# Patient Record
Sex: FEMALE | Race: WHITE | Employment: OTHER | URBAN - METROPOLITAN AREA
[De-identification: names, ages, dates, MRNs, and addresses within clinical notes are randomized per-mention and may not be internally consistent; named-entity substitution may affect disease eponyms.]

---

## 2017-03-02 RX ORDER — MELOXICAM 7.5 MG
TABLET ORAL
Qty: 90 TABLET | Refills: 0 | OUTPATIENT
Start: 2017-03-02

## 2017-04-25 ENCOUNTER — TELEPHONE (OUTPATIENT)
Dept: FAMILY MEDICINE CLINIC | Age: 77
End: 2017-04-25

## 2017-04-25 ENCOUNTER — OFFICE VISIT (OUTPATIENT)
Dept: FAMILY MEDICINE CLINIC | Age: 77
End: 2017-04-25

## 2017-04-25 VITALS
DIASTOLIC BLOOD PRESSURE: 64 MMHG | RESPIRATION RATE: 16 BRPM | OXYGEN SATURATION: 98 % | BODY MASS INDEX: 30.19 KG/M2 | HEART RATE: 70 BPM | HEIGHT: 60 IN | WEIGHT: 153.8 LBS | SYSTOLIC BLOOD PRESSURE: 134 MMHG

## 2017-04-25 DIAGNOSIS — E78.5 HYPERLIPIDEMIA, UNSPECIFIED HYPERLIPIDEMIA TYPE: ICD-10-CM

## 2017-04-25 DIAGNOSIS — I10 ESSENTIAL HYPERTENSION, BENIGN: ICD-10-CM

## 2017-04-25 DIAGNOSIS — Z82.49 FH: HEART DISEASE: ICD-10-CM

## 2017-04-25 DIAGNOSIS — E78.5 HYPERLIPIDEMIA, UNSPECIFIED HYPERLIPIDEMIA TYPE: Primary | ICD-10-CM

## 2017-04-25 DIAGNOSIS — R07.81 RIB PAIN ON LEFT SIDE: Primary | ICD-10-CM

## 2017-04-25 DIAGNOSIS — R42 FEELING FAINT: ICD-10-CM

## 2017-04-25 DIAGNOSIS — R21 RASH: ICD-10-CM

## 2017-04-25 DIAGNOSIS — B35.1 ONYCHOMYCOSIS: ICD-10-CM
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
## 2017-04-25 PROCEDURE — 99214 OFFICE O/P EST MOD 30 MIN: CPT | Performed by: FAMILY MEDICINE

## 2017-04-25 PROCEDURE — 93000 ELECTROCARDIOGRAM COMPLETE: CPT | Performed by: FAMILY MEDICINE

## 2017-04-25 RX ORDER — FLUVASTATIN SODIUM 80 MG/1
TABLET, FILM COATED, EXTENDED RELEASE ORAL
Qty: 30 TABLET | Refills: 11 | Status: SHIPPED | OUTPATIENT
Start: 2017-04-25 | End: 2018-04-30 | Stop reason: SDUPTHER

## 2017-04-25 ASSESSMENT — PATIENT HEALTH QUESTIONNAIRE - PHQ9
1. LITTLE INTEREST OR PLEASURE IN DOING THINGS: 0
SUM OF ALL RESPONSES TO PHQ9 QUESTIONS 1 & 2: 0
SUM OF ALL RESPONSES TO PHQ QUESTIONS 1-9: 0
2. FEELING DOWN, DEPRESSED OR HOPELESS: 0

## 2017-06-05 RX ORDER — MELOXICAM 7.5 MG
TABLET ORAL
Qty: 90 TABLET | Refills: 2 | Status: SHIPPED | OUTPATIENT
Start: 2017-06-05 | End: 2017-09-11 | Stop reason: SDUPTHER

## 2017-06-19 DIAGNOSIS — I10 ESSENTIAL HYPERTENSION, BENIGN: ICD-10-CM

## 2017-06-19 DIAGNOSIS — E78.5 HYPERLIPIDEMIA, UNSPECIFIED HYPERLIPIDEMIA TYPE: ICD-10-CM

## 2017-06-19 LAB
A/G RATIO: 1.5 (ref 1.1–2.2)
ALBUMIN SERPL-MCNC: 4 G/DL (ref 3.4–5)
ALP BLD-CCNC: 78 U/L (ref 40–129)
ALT SERPL-CCNC: 17 U/L (ref 10–40)
ANION GAP SERPL CALCULATED.3IONS-SCNC: 12 MMOL/L (ref 3–16)
AST SERPL-CCNC: 19 U/L (ref 15–37)
BASOPHILS ABSOLUTE: 0 K/UL (ref 0–0.2)
BASOPHILS RELATIVE PERCENT: 0.7 %
BILIRUB SERPL-MCNC: 0.4 MG/DL (ref 0–1)
BUN BLDV-MCNC: 20 MG/DL (ref 7–20)
CALCIUM SERPL-MCNC: 9.1 MG/DL (ref 8.3–10.6)
CHLORIDE BLD-SCNC: 105 MMOL/L (ref 99–110)
CHOLESTEROL, TOTAL: 148 MG/DL (ref 0–199)
CO2: 26 MMOL/L (ref 21–32)
CREAT SERPL-MCNC: 0.8 MG/DL (ref 0.6–1.2)
EOSINOPHILS ABSOLUTE: 0.1 K/UL (ref 0–0.6)
EOSINOPHILS RELATIVE PERCENT: 2.4 %
GFR AFRICAN AMERICAN: >60
GFR NON-AFRICAN AMERICAN: >60
GLOBULIN: 2.7 G/DL
GLUCOSE BLD-MCNC: 94 MG/DL (ref 70–99)
HCT VFR BLD CALC: 36 % (ref 36–48)
HDLC SERPL-MCNC: 65 MG/DL (ref 40–60)
HEMOGLOBIN: 11.8 G/DL (ref 12–16)
LDL CHOLESTEROL CALCULATED: 69 MG/DL
LYMPHOCYTES ABSOLUTE: 1.5 K/UL (ref 1–5.1)
LYMPHOCYTES RELATIVE PERCENT: 25.2 %
MCH RBC QN AUTO: 29.3 PG (ref 26–34)
MCHC RBC AUTO-ENTMCNC: 32.7 G/DL (ref 31–36)
MCV RBC AUTO: 89.4 FL (ref 80–100)
MONOCYTES ABSOLUTE: 0.4 K/UL (ref 0–1.3)
MONOCYTES RELATIVE PERCENT: 6.7 %
NEUTROPHILS ABSOLUTE: 4 K/UL (ref 1.7–7.7)
NEUTROPHILS RELATIVE PERCENT: 65 %
PDW BLD-RTO: 14 % (ref 12.4–15.4)
PLATELET # BLD: 203 K/UL (ref 135–450)
PMV BLD AUTO: 8.9 FL (ref 5–10.5)
POTASSIUM SERPL-SCNC: 4.1 MMOL/L (ref 3.5–5.1)
RBC # BLD: 4.03 M/UL (ref 4–5.2)
SODIUM BLD-SCNC: 143 MMOL/L (ref 136–145)
TOTAL PROTEIN: 6.7 G/DL (ref 6.4–8.2)
TRIGL SERPL-MCNC: 72 MG/DL (ref 0–150)
TSH REFLEX: 3.67 UIU/ML (ref 0.27–4.2)
VLDLC SERPL CALC-MCNC: 14 MG/DL
WBC # BLD: 6.1 K/UL (ref 4–11)

## 2017-06-27 DIAGNOSIS — M79.89 LEG SWELLING: ICD-10-CM

## 2017-06-27 RX ORDER — FUROSEMIDE 20 MG/1
20 TABLET ORAL DAILY PRN
Qty: 30 TABLET | Refills: 3 | Status: SHIPPED | OUTPATIENT
Start: 2017-06-27 | End: 2018-10-06 | Stop reason: SDUPTHER

## 2017-06-29 ENCOUNTER — OFFICE VISIT (OUTPATIENT)
Dept: FAMILY MEDICINE CLINIC | Age: 77
End: 2017-06-29

## 2017-06-29 VITALS
HEART RATE: 68 BPM | HEIGHT: 59 IN | WEIGHT: 162 LBS | OXYGEN SATURATION: 97 % | BODY MASS INDEX: 32.66 KG/M2 | DIASTOLIC BLOOD PRESSURE: 64 MMHG | RESPIRATION RATE: 14 BRPM | SYSTOLIC BLOOD PRESSURE: 134 MMHG

## 2017-06-29 DIAGNOSIS — Z00.00 MEDICARE ANNUAL WELLNESS VISIT, SUBSEQUENT: Primary | ICD-10-CM

## 2017-06-29 DIAGNOSIS — Z00.00 ROUTINE GENERAL MEDICAL EXAMINATION AT A HEALTH CARE FACILITY: ICD-10-CM

## 2017-06-29 PROCEDURE — G0439 PPPS, SUBSEQ VISIT: HCPCS | Performed by: FAMILY MEDICINE

## 2017-06-29 ASSESSMENT — PATIENT HEALTH QUESTIONNAIRE - PHQ9: SUM OF ALL RESPONSES TO PHQ QUESTIONS 1-9: 0

## 2017-06-29 ASSESSMENT — ANXIETY QUESTIONNAIRES: GAD7 TOTAL SCORE: 0

## 2017-06-29 ASSESSMENT — LIFESTYLE VARIABLES: HOW OFTEN DO YOU HAVE A DRINK CONTAINING ALCOHOL: 0

## 2017-07-13 ENCOUNTER — OFFICE VISIT (OUTPATIENT)
Dept: FAMILY MEDICINE CLINIC | Age: 77
End: 2017-07-13

## 2017-07-13 VITALS
BODY MASS INDEX: 29.81 KG/M2 | WEIGHT: 162 LBS | OXYGEN SATURATION: 98 % | RESPIRATION RATE: 14 BRPM | SYSTOLIC BLOOD PRESSURE: 125 MMHG | HEART RATE: 70 BPM | HEIGHT: 62 IN | DIASTOLIC BLOOD PRESSURE: 60 MMHG
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
## 2017-07-13 PROCEDURE — 99212 OFFICE O/P EST SF 10 MIN: CPT | Performed by: FAMILY MEDICINE

## 2017-07-13 RX ORDER — TRIAMCINOLONE ACETONIDE 1 MG/G
CREAM TOPICAL 2 TIMES DAILY
Qty: 30 G | Refills: 1 | Status: SHIPPED | OUTPATIENT
Start: 2017-07-13 | End: 2017-12-18 | Stop reason: ALTCHOICE

## 2017-07-24 ENCOUNTER — TELEPHONE (OUTPATIENT)
Dept: FAMILY MEDICINE CLINIC | Age: 77
End: 2017-07-24

## 2017-07-25 ENCOUNTER — TELEPHONE (OUTPATIENT)
Dept: FAMILY MEDICINE CLINIC | Age: 77
End: 2017-07-25

## 2017-07-25 RX ORDER — DIAZEPAM 5 MG/1
TABLET ORAL
Qty: 5 TABLET | Refills: 0 | Status: SHIPPED | OUTPATIENT
Start: 2017-07-25 | End: 2021-04-15 | Stop reason: SDUPTHER

## 2017-08-07 RX ORDER — MELOXICAM 7.5 MG
TABLET ORAL
Qty: 90 TABLET | Refills: 2 | OUTPATIENT
Start: 2017-08-07

## 2017-08-08 ENCOUNTER — TELEPHONE (OUTPATIENT)
Dept: FAMILY MEDICINE CLINIC | Age: 77
End: 2017-08-08

## 2017-09-01 ENCOUNTER — TELEPHONE (OUTPATIENT)
Dept: FAMILY MEDICINE CLINIC | Age: 77
End: 2017-09-01

## 2017-09-11 RX ORDER — MELOXICAM 7.5 MG
TABLET ORAL
Qty: 90 TABLET | Refills: 2 | Status: SHIPPED | OUTPATIENT
Start: 2017-09-11 | End: 2017-10-10 | Stop reason: SDUPTHER

## 2017-09-19 RX ORDER — OLMESARTAN MEDOXOMIL 20 MG/1
20 TABLET ORAL DAILY
Qty: 90 TABLET | Refills: 1 | Status: SHIPPED | OUTPATIENT
Start: 2017-09-19 | End: 2017-11-13

## 2017-10-04 NOTE — TELEPHONE ENCOUNTER
Last ov 07/03/17   Last refill 09/11/17 #90 [facility] this [facility] came from [facility]

## 2017-10-05 RX ORDER — MELOXICAM 7.5 MG
TABLET ORAL
Qty: 30 TABLET | OUTPATIENT
Start: 2017-10-05

## 2017-10-05 NOTE — TELEPHONE ENCOUNTER
I doubt she needs this and she should've received 90 from her mail order in mid-September but check with her

## 2017-10-06 RX ORDER — OLMESARTAN MEDOXOMIL 20 MG/1
TABLET, FILM COATED ORAL
Qty: 30 TABLET | OUTPATIENT
Start: 2017-10-06

## 2017-10-10 RX ORDER — MELOXICAM 7.5 MG
TABLET ORAL
Qty: 90 TABLET | Refills: 0 | Status: SHIPPED | OUTPATIENT
Start: 2017-10-10 | End: 2018-01-05 | Stop reason: SDUPTHER

## 2017-10-10 NOTE — TELEPHONE ENCOUNTER
Patient returned our call stating that she does not use a mail order pharmacy, she uses [facility] pharmacy. She stated that she has never received the medicine.

## 2017-11-13 RX ORDER — OLMESARTAN MEDOXOMIL 20 MG/1
TABLET, FILM COATED ORAL
Qty: 30 TABLET | Refills: 5 | Status: SHIPPED | OUTPATIENT
Start: 2017-11-13 | End: 2017-11-20

## 2017-11-20 RX ORDER — OLMESARTAN MEDOXOMIL 20 MG/1
20 TABLET ORAL DAILY
Qty: 90 TABLET | Refills: 3 | Status: SHIPPED | OUTPATIENT
Start: 2017-11-20 | End: 2018-06-05

## 2017-11-20 NOTE — TELEPHONE ENCOUNTER
Received pharmacy request to change BENICAR 20 MG tablet to Olmesartan Medoxomil 20MG Tabs to save pt money

## 2017-11-20 NOTE — TELEPHONE ENCOUNTER
If okay, rx pending.     Last ov: 07/13/17 with Dr. Johnathan Negrete for insect bites, 06/29/17 with Dr. Laina Bess for Medicare Wellness  Last refill: 09/19/17, #90, 1 refill But was switched back to name brand    Lab Results   Component Value Date     06/19/2017    K 4.1 06/19/2017     06/19/2017    CO2 26 06/19/2017    BUN 20 06/19/2017    CREATININE 0.8 06/19/2017    GLUCOSE 94 06/19/2017    CALCIUM 9.1 06/19/2017    PROT 6.7 06/19/2017    LABALBU 4.0 06/19/2017    BILITOT 0.4 06/19/2017    ALKPHOS 78 06/19/2017    AST 19 06/19/2017    ALT 17 06/19/2017    LABGLOM >60 06/19/2017    GFRAA >60 06/19/2017    AGRATIO 1.5 06/19/2017    GLOB 2.7 06/19/2017

## 2017-12-18 ENCOUNTER — OFFICE VISIT (OUTPATIENT)
Dept: FAMILY MEDICINE CLINIC | Age: 77
End: 2017-12-18

## 2017-12-18 VITALS
HEART RATE: 70 BPM | DIASTOLIC BLOOD PRESSURE: 60 MMHG | RESPIRATION RATE: 16 BRPM | SYSTOLIC BLOOD PRESSURE: 140 MMHG | WEIGHT: 164.8 LBS | OXYGEN SATURATION: 97 % | BODY MASS INDEX: 30.14 KG/M2

## 2017-12-18 DIAGNOSIS — I10 ESSENTIAL HYPERTENSION, BENIGN: Primary | ICD-10-CM

## 2017-12-18 PROCEDURE — 99213 OFFICE O/P EST LOW 20 MIN: CPT | Performed by: FAMILY MEDICINE

## 2017-12-22 ENCOUNTER — OFFICE VISIT (OUTPATIENT)
Dept: FAMILY MEDICINE CLINIC | Age: 77
End: 2017-12-22

## 2017-12-22 VITALS
HEIGHT: 62 IN | SYSTOLIC BLOOD PRESSURE: 160 MMHG | BODY MASS INDEX: 30.18 KG/M2 | HEART RATE: 68 BPM | WEIGHT: 164 LBS | DIASTOLIC BLOOD PRESSURE: 70 MMHG | OXYGEN SATURATION: 98 % | RESPIRATION RATE: 14 BRPM

## 2017-12-22 DIAGNOSIS — R60.0 PEDAL EDEMA: Primary | ICD-10-CM

## 2017-12-22 DIAGNOSIS — I10 ESSENTIAL HYPERTENSION, BENIGN: ICD-10-CM

## 2017-12-22 PROCEDURE — 99213 OFFICE O/P EST LOW 20 MIN: CPT | Performed by: FAMILY MEDICINE

## 2017-12-22 RX ORDER — HYDROCHLOROTHIAZIDE 12.5 MG/1
12.5 TABLET ORAL DAILY
Qty: 30 TABLET | Refills: 3 | Status: SHIPPED | OUTPATIENT
Start: 2017-12-22 | End: 2018-12-18

## 2017-12-22 ASSESSMENT — ENCOUNTER SYMPTOMS
WHEEZING: 0
GASTROINTESTINAL NEGATIVE: 1
SHORTNESS OF BREATH: 0
RESPIRATORY NEGATIVE: 1

## 2017-12-22 NOTE — PATIENT INSTRUCTIONS
have signs of a blood clot, such as:  ¨ Pain in your calf, back of the knee, thigh, or groin. ¨ Redness and swelling in your leg or groin. ? · You have symptoms of infection, such as:  ¨ Increased pain, swelling, warmth, or redness. ¨ Red streaks or pus. ¨ A fever. ? Watch closely for changes in your health, and be sure to contact your doctor if:  ? · Your swelling is getting worse. ? · You have new or worsening pain in your legs. ? · You do not get better as expected. Where can you learn more? Go to [email or web address] and sign in to your [employer] account. Enter V191 in the [employer] box to learn more about \"Leg and Ankle Edema: Care Instructions. \"     If you do not have an account, please click on the \"Sign Up Now\" link. Current as of: March 20, 2017  Content Version: 11.4  © 0419-9574 Healthwise, EverConnect. Care instructions adapted under license by [facility] [place] ([facility]). If you have questions about a medical condition or this instruction, always ask your healthcare professional. [place] [address] any warranty or liability for your use of this information.

## 2018-01-15 ENCOUNTER — TELEPHONE (OUTPATIENT)
Dept: FAMILY MEDICINE CLINIC | Age: 78
End: 2018-01-15

## 2018-01-15 NOTE — TELEPHONE ENCOUNTER
If she only wants trade name Mobic or has not tried anything else: probably not if they offer generic Meloxicam or she has to try 1 or 2 other medications

## 2018-01-15 NOTE — TELEPHONE ENCOUNTER
Patient called back in stated that she spoke with [employer] told her that a prior authorizations needs to be done on the prescription.  [facility] number [phone]

## 2018-02-06 DIAGNOSIS — K21.9 GASTROESOPHAGEAL REFLUX DISEASE, ESOPHAGITIS PRESENCE NOT SPECIFIED: ICD-10-CM

## 2018-02-06 RX ORDER — OMEPRAZOLE 20 MG/1
CAPSULE, DELAYED RELEASE ORAL
Qty: 30 CAPSULE | Refills: 10 | Status: SHIPPED | OUTPATIENT
Start: 2018-02-06 | End: 2019-01-23 | Stop reason: SDUPTHER

## 2018-02-13 ENCOUNTER — TELEPHONE (OUTPATIENT)
Dept: FAMILY MEDICINE CLINIC | Age: 78
End: 2018-02-13

## 2018-02-13 NOTE — TELEPHONE ENCOUNTER
Patient called in stating that she was prescribed Hydrochlorothiazide and is still having swelling in her left foot. She stated that she wants to know if there is something else that can be prescribed to her because she has read the side effects and it stated that this could cause eye issues. She stated that her father was blind and she does not want to take anything that can possibly damage her eyes.     Please Advise

## 2018-02-14 ENCOUNTER — TELEPHONE (OUTPATIENT)
Dept: ORTHOPEDIC SURGERY | Age: 78
End: 2018-02-14

## 2018-02-14 NOTE — TELEPHONE ENCOUNTER
Completed a PA request on [facility] for Mobic and the medication was     APPROVED    Exp: 1/15/18-2/14/19    Please advise patient

## 2018-02-19 ENCOUNTER — TELEPHONE (OUTPATIENT)
Dept: FAMILY MEDICINE CLINIC | Age: 78
End: 2018-02-19

## 2018-02-19 DIAGNOSIS — I10 ESSENTIAL HYPERTENSION, BENIGN: Primary | ICD-10-CM

## 2018-02-19 DIAGNOSIS — Z00.00 MEDICARE ANNUAL WELLNESS VISIT, INITIAL: ICD-10-CM

## 2018-02-19 DIAGNOSIS — E78.5 HYPERLIPIDEMIA, UNSPECIFIED HYPERLIPIDEMIA TYPE: ICD-10-CM

## 2018-03-01 DIAGNOSIS — E78.5 HYPERLIPIDEMIA, UNSPECIFIED HYPERLIPIDEMIA TYPE: ICD-10-CM

## 2018-03-01 DIAGNOSIS — R73.03 PREDIABETES: ICD-10-CM

## 2018-03-01 DIAGNOSIS — I10 ESSENTIAL HYPERTENSION, BENIGN: ICD-10-CM

## 2018-03-01 DIAGNOSIS — Z13.1 SCREENING FOR DIABETES MELLITUS (DM): ICD-10-CM

## 2018-03-01 DIAGNOSIS — I10 ESSENTIAL HYPERTENSION, BENIGN: Primary | ICD-10-CM

## 2018-03-01 LAB
A/G RATIO: 1.9 (ref 1.1–2.2)
ALBUMIN SERPL-MCNC: 4.2 G/DL (ref 3.4–5)
ALP BLD-CCNC: 79 U/L (ref 40–129)
ALT SERPL-CCNC: 12 U/L (ref 10–40)
ANION GAP SERPL CALCULATED.3IONS-SCNC: 13 MMOL/L (ref 3–16)
AST SERPL-CCNC: 18 U/L (ref 15–37)
BASOPHILS ABSOLUTE: 0 K/UL (ref 0–0.2)
BASOPHILS RELATIVE PERCENT: 0.5 %
BILIRUB SERPL-MCNC: 0.7 MG/DL (ref 0–1)
BUN BLDV-MCNC: 21 MG/DL (ref 7–20)
CALCIUM SERPL-MCNC: 8.6 MG/DL (ref 8.3–10.6)
CHLORIDE BLD-SCNC: 105 MMOL/L (ref 99–110)
CHOLESTEROL, TOTAL: 154 MG/DL (ref 0–199)
CO2: 26 MMOL/L (ref 21–32)
CREAT SERPL-MCNC: 0.9 MG/DL (ref 0.6–1.2)
EOSINOPHILS ABSOLUTE: 0.1 K/UL (ref 0–0.6)
EOSINOPHILS RELATIVE PERCENT: 2.2 %
GFR AFRICAN AMERICAN: >60
GFR NON-AFRICAN AMERICAN: >60
GLOBULIN: 2.2 G/DL
GLUCOSE BLD-MCNC: 102 MG/DL (ref 70–99)
HCT VFR BLD CALC: 36.2 % (ref 36–48)
HDLC SERPL-MCNC: 58 MG/DL (ref 40–60)
HEMOGLOBIN: 12.2 G/DL (ref 12–16)
LDL CHOLESTEROL CALCULATED: 64 MG/DL
LYMPHOCYTES ABSOLUTE: 1.4 K/UL (ref 1–5.1)
LYMPHOCYTES RELATIVE PERCENT: 28.5 %
MCH RBC QN AUTO: 29.8 PG (ref 26–34)
MCHC RBC AUTO-ENTMCNC: 33.6 G/DL (ref 31–36)
MCV RBC AUTO: 88.8 FL (ref 80–100)
MONOCYTES ABSOLUTE: 0.4 K/UL (ref 0–1.3)
MONOCYTES RELATIVE PERCENT: 7.8 %
NEUTROPHILS ABSOLUTE: 3.1 K/UL (ref 1.7–7.7)
NEUTROPHILS RELATIVE PERCENT: 61 %
PDW BLD-RTO: 14.3 % (ref 12.4–15.4)
PLATELET # BLD: 237 K/UL (ref 135–450)
PMV BLD AUTO: 8.5 FL (ref 5–10.5)
POTASSIUM SERPL-SCNC: 4.1 MMOL/L (ref 3.5–5.1)
RBC # BLD: 4.08 M/UL (ref 4–5.2)
SODIUM BLD-SCNC: 144 MMOL/L (ref 136–145)
TOTAL PROTEIN: 6.4 G/DL (ref 6.4–8.2)
TRIGL SERPL-MCNC: 158 MG/DL (ref 0–150)
TSH REFLEX: 2.77 UIU/ML (ref 0.27–4.2)
VLDLC SERPL CALC-MCNC: 32 MG/DL
WBC # BLD: 5 K/UL (ref 4–11)

## 2018-03-02 LAB
ESTIMATED AVERAGE GLUCOSE: 111.2 MG/DL
HBA1C MFR BLD: 5.5 %

## 2018-03-08 ENCOUNTER — OFFICE VISIT (OUTPATIENT)
Dept: FAMILY MEDICINE CLINIC | Age: 78
End: 2018-03-08

## 2018-03-08 VITALS
SYSTOLIC BLOOD PRESSURE: 134 MMHG | OXYGEN SATURATION: 94 % | WEIGHT: 165 LBS | BODY MASS INDEX: 30.18 KG/M2 | DIASTOLIC BLOOD PRESSURE: 70 MMHG | HEART RATE: 65 BPM

## 2018-03-08 DIAGNOSIS — E78.2 MIXED HYPERLIPIDEMIA: ICD-10-CM

## 2018-03-08 DIAGNOSIS — R60.0 PEDAL EDEMA: ICD-10-CM

## 2018-03-08 DIAGNOSIS — Z00.00 MEDICARE ANNUAL WELLNESS VISIT, SUBSEQUENT: Primary | ICD-10-CM

## 2018-03-08 DIAGNOSIS — Z00.00 ROUTINE GENERAL MEDICAL EXAMINATION AT A HEALTH CARE FACILITY: ICD-10-CM

## 2018-03-08 DIAGNOSIS — H92.02 LEFT EAR PAIN: ICD-10-CM

## 2018-03-08 DIAGNOSIS — I10 ESSENTIAL HYPERTENSION, BENIGN: ICD-10-CM

## 2018-03-08 PROCEDURE — G0439 PPPS, SUBSEQ VISIT: HCPCS | Performed by: FAMILY MEDICINE

## 2018-03-08 ASSESSMENT — ANXIETY QUESTIONNAIRES: GAD7 TOTAL SCORE: 0

## 2018-03-08 ASSESSMENT — LIFESTYLE VARIABLES: HOW OFTEN DO YOU HAVE A DRINK CONTAINING ALCOHOL: 0

## 2018-03-08 ASSESSMENT — PATIENT HEALTH QUESTIONNAIRE - PHQ9: SUM OF ALL RESPONSES TO PHQ QUESTIONS 1-9: 0

## 2018-04-30 DIAGNOSIS — E78.5 HYPERLIPIDEMIA, UNSPECIFIED HYPERLIPIDEMIA TYPE: ICD-10-CM

## 2018-04-30 RX ORDER — FLUVASTATIN SODIUM 80 MG/1
TABLET, EXTENDED RELEASE ORAL
Qty: 30 TABLET | Refills: 11 | Status: SHIPPED | OUTPATIENT
Start: 2018-04-30 | End: 2019-05-02 | Stop reason: SDUPTHER

## 2018-07-20 ENCOUNTER — OFFICE VISIT (OUTPATIENT)
Dept: FAMILY MEDICINE CLINIC | Age: 78
End: 2018-07-20

## 2018-07-20 VITALS
WEIGHT: 164.8 LBS | DIASTOLIC BLOOD PRESSURE: 76 MMHG | OXYGEN SATURATION: 99 % | HEART RATE: 66 BPM | SYSTOLIC BLOOD PRESSURE: 124 MMHG | BODY MASS INDEX: 30.14 KG/M2

## 2018-07-20 DIAGNOSIS — R59.1 LYMPHADENOPATHY: Primary | ICD-10-CM

## 2018-07-20 PROCEDURE — 99213 OFFICE O/P EST LOW 20 MIN: CPT | Performed by: FAMILY MEDICINE

## 2018-07-20 ASSESSMENT — PATIENT HEALTH QUESTIONNAIRE - PHQ9
1. LITTLE INTEREST OR PLEASURE IN DOING THINGS: 0
SUM OF ALL RESPONSES TO PHQ QUESTIONS 1-9: 0
2. FEELING DOWN, DEPRESSED OR HOPELESS: 0
SUM OF ALL RESPONSES TO PHQ9 QUESTIONS 1 & 2: 0

## 2018-07-20 ASSESSMENT — ENCOUNTER SYMPTOMS
SORE THROAT: 0
SHORTNESS OF BREATH: 0
GASTROINTESTINAL NEGATIVE: 1
RHINORRHEA: 0
COUGH: 0

## 2018-07-20 NOTE — PROGRESS NOTES
Subjective:      Patient ID: Darryl Sanchez is a 70 y.o. female. HPI  Felt a knot on both sides of her neck last week. No pain or other symptoms. She denies any sore throat, earache, fever, chills, diaphoresis, appetite or weight change. See review of systems  Review of Systems   Constitutional: Negative for chills, diaphoresis and fever. HENT: Negative for congestion, ear pain, postnasal drip, rhinorrhea and sore throat. Respiratory: Negative for cough and shortness of breath. Cardiovascular: Negative. Gastrointestinal: Negative. Genitourinary: Negative. Musculoskeletal: Negative. Neurological: Negative. Objective:   Physical Exam   Constitutional: She is oriented to person, place, and time. She appears well-developed. No distress. HENT:   Right Ear: Tympanic membrane, external ear and ear canal normal.   Left Ear: Tympanic membrane, external ear and ear canal normal.   Mouth/Throat: Oropharynx is clear and moist. No oropharyngeal exudate. Neck: Neck supple. Carotid bruit is not present. No thyromegaly present. Cardiovascular: Normal rate and regular rhythm. Pulmonary/Chest: Effort normal and breath sounds normal. She has no wheezes. She has no rales. Musculoskeletal: She exhibits no edema. Lymphadenopathy:     She has no cervical adenopathy ( I did not really feel any significant cervical adenopathy. ). Neurological: She is alert and oriented to person, place, and time. Skin: Skin is warm and dry. Psychiatric: She has a normal mood and affect. Her behavior is normal. Judgment and thought content normal.         Assessment/Plan:    Mayela Horne was seen today for mass. Diagnoses and all orders for this visit:    Lymphadenopathy  She was offered the opportunity to see a surgeon to consider a biopsy. At this point in time she did not think that was necessary.  I did discuss with her that I thought her exam was essentially negative and I did not feel any significant adenopathy. She will observe these for the next 4 weeks and if she does not feel they have disappeared or significant improved or indeed if they are enlarging, we will have her see a surgeon.

## 2018-07-20 NOTE — PATIENT INSTRUCTIONS
Patient Education        Swollen Lymph Nodes: Care Instructions  Your Care Instructions    Lymph nodes are small, bean-shaped glands throughout the body. They help your body fight germs and infections. Lymph nodes often swell when there is a problem such as an injury, infection, or tumor. · The nodes in your neck, under your chin, or behind your ears may swell when you have a cold or sore throat. · An injury or infection in a leg or foot can make the nodes in your groin swell. · Sometimes medicine can make lymph nodes swell, but this is rare. Treatment depends on what caused your nodes to swell. Usually the nodes return to normal size without a problem. Follow-up care is a key part of your treatment and safety. Be sure to make and go to all appointments, and call your doctor if you are having problems. It's also a good idea to know your test results and keep a list of the medicines you take. How can you care for yourself at home? · Take your medicines exactly as prescribed. Call your doctor if you think you are having a problem with your medicine. · Avoid irritation. ¨ Do not squeeze or pick at the lump. ¨ Do not stick a needle in it. · Prevent infection. Do not squeeze, drain, or puncture a painful lump. Doing this can irritate or inflame the lump, push any existing infection deeper into the skin, or cause severe bleeding. · Get extra rest. Slow down just a little from your usual routine. · Drink plenty of fluids, enough so that your urine is light yellow or clear like water. If you have kidney, heart, or liver disease and have to limit fluids, talk with your doctor before you increase the amount of fluids you drink. · Take an over-the-counter pain medicine, such as acetaminophen (Tylenol), ibuprofen (Advil, Motrin), or naproxen (Aleve). Read and follow all instructions on the label. · Do not take two or more pain medicines at the same time unless the doctor told you to.  Many pain medicines have acetaminophen, which is Tylenol. Too much acetaminophen (Tylenol) can be harmful. When should you call for help? Call your doctor now or seek immediate medical care if:    · You have worse symptoms of infection, such as:  ¨ Increased pain, swelling, warmth, or redness. ¨ Red streaks leading from the area. ¨ Pus draining from the area. ¨ A fever.    Watch closely for changes in your health, and be sure to contact your doctor if:    · Your lymph nodes do not get smaller or do not return to normal.     · You do not get better as expected. Where can you learn more? Go to [email or web address].[email or web address] org and sign in to your [employer] account. Enter W009 in the W[employer]io box to learn more about \"Swollen Lymph Nodes: Care Instructions. \"     If you do not have an account, please click on the \"Sign Up Now\" link. Current as of: November 18, 2017  Content Version: 11.6  © 8409-0372 [employer], Smarter P[employer]. Care instructions adapted under license by [facility] ([facility]). If you have questions about a medical condition or this instruction, always ask your healthcare professional. [place] [address] any warranty or liability for your use of this information.

## 2018-09-19 ENCOUNTER — TELEPHONE (OUTPATIENT)
Dept: FAMILY MEDICINE CLINIC | Age: 78
End: 2018-09-19

## 2018-09-19 NOTE — TELEPHONE ENCOUNTER
Office has been notified that pt is requiring Prior Authorization for the following medication:    Benicare    Please initiate this request through CoverMyMeds, contacting the following Payor/Insurance:    [employer]     Please see below, or the documentation attached to this encounter for any additional information that may assist in processing PA:    Please provide ICD -10 code, than forward to PA team for authorization. Thank you!

## 2018-09-20 NOTE — TELEPHONE ENCOUNTER
[phone] ([place])   . LM for patient to call the office back before 5pm or during our business hours.

## 2018-09-24 ENCOUNTER — OFFICE VISIT (OUTPATIENT)
Dept: FAMILY MEDICINE CLINIC | Age: 78
End: 2018-09-24
Payer: MEDICARE

## 2018-09-24 VITALS
HEART RATE: 64 BPM | DIASTOLIC BLOOD PRESSURE: 74 MMHG | WEIGHT: 162.4 LBS | OXYGEN SATURATION: 98 % | BODY MASS INDEX: 29.7 KG/M2 | SYSTOLIC BLOOD PRESSURE: 160 MMHG

## 2018-09-24 DIAGNOSIS — I10 ESSENTIAL HYPERTENSION, BENIGN: Primary | ICD-10-CM

## 2018-09-24 DIAGNOSIS — R22.1 NECK NODULE: ICD-10-CM

## 2018-09-24 PROCEDURE — 99213 OFFICE O/P EST LOW 20 MIN: CPT | Performed by: FAMILY MEDICINE

## 2018-09-24 ASSESSMENT — PATIENT HEALTH QUESTIONNAIRE - PHQ9
2. FEELING DOWN, DEPRESSED OR HOPELESS: 0
1. LITTLE INTEREST OR PLEASURE IN DOING THINGS: 0
SUM OF ALL RESPONSES TO PHQ QUESTIONS 1-9: 0
SUM OF ALL RESPONSES TO PHQ QUESTIONS 1-9: 0
SUM OF ALL RESPONSES TO PHQ9 QUESTIONS 1 & 2: 0

## 2018-09-24 NOTE — PROGRESS NOTES
neck.  Lungs: Chest is clear; no wheezes or rales. Heart: S1 and S2 normal, no murmurs, clicks, gallops or rubs. Regular rate and rhythm. Ext[de-identified] No edema  Lab review: 3/2018. Assessment/Plan:    Chapin Brandt was seen today for nodule. Diagnoses and all orders for this visit:    Essential hypertension, benign  Needs better control however she was out of medication for 2 days. I advised her to perform home blood pressure checks and also to return in one month for a recheck of her blood pressure. Neck nodule  I gave her the option of an ultrasound, surgical consultation, or observation for another month and we could reevaluate when she came in for her above blood pressure check. She opted for this latter choice.

## 2018-09-24 NOTE — PATIENT INSTRUCTIONS
Patient Education        Low Sodium Diet (2,000 Milligram): Care Instructions  Your Care Instructions    Too much sodium causes your body to hold on to extra water. This can raise your blood pressure and force your heart and kidneys to work harder. In very serious cases, this could cause you to be put in the hospital. It might even be life-threatening. By limiting sodium, you will feel better and lower your risk of serious problems. The most common source of sodium is salt. People get most of the salt in their diet from canned, prepared, and packaged foods. Fast food and restaurant meals also are very high in sodium. Your doctor will probably limit your sodium to less than 2,000 milligrams (mg) a day. This limit counts all the sodium in prepared and packaged foods and any salt you add to your food. Follow-up care is a key part of your treatment and safety. Be sure to make and go to all appointments, and call your doctor if you are having problems. It's also a good idea to know your test results and keep a list of the medicines you take. How can you care for yourself at home? Read food labels  · Read labels on cans and food packages. The labels tell you how much sodium is in each serving. Make sure that you look at the serving size. If you eat more than the serving size, you have eaten more sodium. · Food labels also tell you the Percent Daily Value for sodium. Choose products with low Percent Daily Values for sodium. · Be aware that sodium can come in forms other than salt, including monosodium glutamate (MSG), sodium citrate, and sodium bicarbonate (baking soda). MSG is often added to Asian food. When you eat out, you can sometimes ask for food without MSG or added salt. Buy low-sodium foods  · Buy foods that are labeled \"unsalted\" (no salt added), \"sodium-free\" (less than 5 mg of sodium per serving), or \"low-sodium\" (less than 140 mg of sodium per serving).  Foods labeled \"reduced-sodium\" and \"light sodium\" may still have too much sodium. Be sure to read the label to see how much sodium you are getting. · Buy fresh vegetables, or frozen vegetables without added sauces. Buy low-sodium versions of canned vegetables, soups, and other canned goods. Prepare low-sodium meals  · Cut back on the amount of salt you use in cooking. This will help you adjust to the taste. Do not add salt after cooking. One teaspoon of salt has about 2,300 mg of sodium. · Take the salt shaker off the table. · Flavor your food with garlic, lemon juice, onion, vinegar, herbs, and spices. Do not use soy sauce, lite soy sauce, steak sauce, onion salt, garlic salt, celery salt, mustard, or ketchup on your food. · Use low-sodium salad dressings, sauces, and ketchup. Or make your own salad dressings and sauces without adding salt. · Use less salt (or none) when recipes call for it. You can often use half the salt a recipe calls for without losing flavor. Other foods such as rice, pasta, and grains do not need added salt. · Rinse canned vegetables, and cook them in fresh water. This removes some-but not all-of the salt. · Avoid water that is naturally high in sodium or that has been treated with water softeners, which add sodium. Call your local water company to find out the sodium content of your water supply. If you buy bottled water, read the label and choose a sodium-free brand. Avoid high-sodium foods  · Avoid eating:  ¨ Smoked, cured, salted, and canned meat, fish, and poultry. ¨ Ham, bunn, hot dogs, and luncheon meats. ¨ Regular, hard, and processed cheese and regular peanut butter. ¨ Crackers with salted tops, and other salted snack foods such as pretzels, chips, and salted popcorn. ¨ Frozen prepared meals, unless labeled low-sodium. ¨ Canned and dried soups, broths, and bouillon, unless labeled sodium-free or low-sodium. ¨ Canned vegetables, unless labeled sodium-free or low-sodium.   ¨ [place] fries, pizza, tacos, and other

## 2018-10-02 ENCOUNTER — OFFICE VISIT (OUTPATIENT)
Dept: FAMILY MEDICINE CLINIC | Age: 78
End: 2018-10-02
Payer: MEDICARE

## 2018-10-02 VITALS
HEART RATE: 71 BPM | OXYGEN SATURATION: 97 % | SYSTOLIC BLOOD PRESSURE: 142 MMHG | WEIGHT: 162.6 LBS | BODY MASS INDEX: 29.74 KG/M2 | DIASTOLIC BLOOD PRESSURE: 80 MMHG

## 2018-10-02 DIAGNOSIS — Z12.11 COLON CANCER SCREENING: ICD-10-CM

## 2018-10-02 DIAGNOSIS — I10 ESSENTIAL HYPERTENSION, BENIGN: ICD-10-CM

## 2018-10-02 DIAGNOSIS — R22.1 NECK MASS: Primary | ICD-10-CM

## 2018-10-02 DIAGNOSIS — Z82.49 FH: HEART DISEASE: ICD-10-CM

## 2018-10-02 LAB
BUN BLDV-MCNC: 18 MG/DL (ref 7–20)
CREAT SERPL-MCNC: 0.8 MG/DL (ref 0.6–1.2)
GFR AFRICAN AMERICAN: >60
GFR NON-AFRICAN AMERICAN: >60

## 2018-10-02 PROCEDURE — 93000 ELECTROCARDIOGRAM COMPLETE: CPT | Performed by: FAMILY MEDICINE

## 2018-10-02 PROCEDURE — 99214 OFFICE O/P EST MOD 30 MIN: CPT | Performed by: FAMILY MEDICINE

## 2018-10-02 PROCEDURE — G8510 SCR DEP NEG, NO PLAN REQD: HCPCS | Performed by: FAMILY MEDICINE

## 2018-10-02 ASSESSMENT — ENCOUNTER SYMPTOMS
RESPIRATORY NEGATIVE: 1
GASTROINTESTINAL NEGATIVE: 1

## 2018-10-02 ASSESSMENT — PATIENT HEALTH QUESTIONNAIRE - PHQ9
SUM OF ALL RESPONSES TO PHQ QUESTIONS 1-9: 0
SUM OF ALL RESPONSES TO PHQ QUESTIONS 1-9: 0
2. FEELING DOWN, DEPRESSED OR HOPELESS: 0
1. LITTLE INTEREST OR PLEASURE IN DOING THINGS: 0
SUM OF ALL RESPONSES TO PHQ9 QUESTIONS 1 & 2: 0

## 2018-10-02 NOTE — PATIENT INSTRUCTIONS
Patient Education        Low Sodium Diet (2,000 Milligram): Care Instructions  Your Care Instructions    Too much sodium causes your body to hold on to extra water. This can raise your blood pressure and force your heart and kidneys to work harder. In very serious cases, this could cause you to be put in the hospital. It might even be life-threatening. By limiting sodium, you will feel better and lower your risk of serious problems. The most common source of sodium is salt. People get most of the salt in their diet from canned, prepared, and packaged foods. Fast food and restaurant meals also are very high in sodium. Your doctor will probably limit your sodium to less than 2,000 milligrams (mg) a day. This limit counts all the sodium in prepared and packaged foods and any salt you add to your food. Follow-up care is a key part of your treatment and safety. Be sure to make and go to all appointments, and call your doctor if you are having problems. It's also a good idea to know your test results and keep a list of the medicines you take. How can you care for yourself at home? Read food labels  · Read labels on cans and food packages. The labels tell you how much sodium is in each serving. Make sure that you look at the serving size. If you eat more than the serving size, you have eaten more sodium. · Food labels also tell you the Percent Daily Value for sodium. Choose products with low Percent Daily Values for sodium. · Be aware that sodium can come in forms other than salt, including monosodium glutamate (MSG), sodium citrate, and sodium bicarbonate (baking soda). MSG is often added to Asian food. When you eat out, you can sometimes ask for food without MSG or added salt. Buy low-sodium foods  · Buy foods that are labeled \"unsalted\" (no salt added), \"sodium-free\" (less than 5 mg of sodium per serving), or \"low-sodium\" (less than 140 mg of sodium per serving).  Foods labeled \"reduced-sodium\" and \"light fast foods. ¨ Pickles, olives, ketchup, and other condiments, especially soy sauce, unless labeled sodium-free or low-sodium. Where can you learn more? Go to https://CenTrak.Wellkeeper. org and sign in to your Playto account. Enter G954 in the [facility] box to learn more about \"Low Sodium Diet (2,000 Milligram): Care Instructions. \"     If you do not have an account, please click on the \"Sign Up Now\" link. Current as of: May 12, 2017  Content Version: 11.7  © 2488-7309 [employer], Incorporated. Care instructions adapted under license by [facility] ([facility]). If you have questions about a medical condition or this instruction, always ask your healthcare professional. [address] any warranty or liability for your use of this information.

## 2018-10-03 ENCOUNTER — TELEPHONE (OUTPATIENT)
Dept: FAMILY MEDICINE CLINIC | Age: 78
End: 2018-10-03

## 2018-10-03 DIAGNOSIS — S09.93XA FACIAL INJURY, INITIAL ENCOUNTER: Primary | ICD-10-CM

## 2018-10-06 DIAGNOSIS — M79.89 LEG SWELLING: ICD-10-CM

## 2018-10-08 RX ORDER — FUROSEMIDE 20 MG/1
TABLET ORAL
Qty: 30 TABLET | Refills: 3 | Status: SHIPPED | OUTPATIENT
Start: 2018-10-08 | End: 2019-06-07

## 2018-10-09 ENCOUNTER — OFFICE VISIT (OUTPATIENT)
Dept: ENT CLINIC | Age: 78
End: 2018-10-09
Payer: MEDICARE

## 2018-10-09 VITALS — DIASTOLIC BLOOD PRESSURE: 72 MMHG | HEART RATE: 76 BPM | OXYGEN SATURATION: 97 % | SYSTOLIC BLOOD PRESSURE: 158 MMHG

## 2018-10-09 DIAGNOSIS — K11.1 SUBMANDIBULAR GLAND HYPERTROPHY: Primary | ICD-10-CM

## 2018-10-09 PROCEDURE — 99203 OFFICE O/P NEW LOW 30 MIN: CPT | Performed by: OTOLARYNGOLOGY

## 2018-10-09 ASSESSMENT — ENCOUNTER SYMPTOMS
RESPIRATORY NEGATIVE: 1
EYES NEGATIVE: 1
ALLERGIC/IMMUNOLOGIC NEGATIVE: 1

## 2018-10-11 ENCOUNTER — TELEPHONE (OUTPATIENT)
Dept: FAMILY MEDICINE CLINIC | Age: 78
End: 2018-10-11

## 2018-10-12 ENCOUNTER — TELEPHONE (OUTPATIENT)
Dept: FAMILY MEDICINE CLINIC | Age: 78
End: 2018-10-12

## 2018-10-22 ENCOUNTER — TELEPHONE (OUTPATIENT)
Dept: FAMILY MEDICINE CLINIC | Age: 78
End: 2018-10-22

## 2018-10-22 NOTE — TELEPHONE ENCOUNTER
Patient states TAI Austin keeps calling house number, patient does not get the information  Please call patient on cell phone in [address] [place] [address] Rd, contacts  Patient is returning call.   Not sure what this is about

## 2018-11-01 ENCOUNTER — OFFICE VISIT (OUTPATIENT)
Dept: FAMILY MEDICINE CLINIC | Age: 78
End: 2018-11-01
Payer: MEDICARE

## 2018-11-01 VITALS
OXYGEN SATURATION: 98 % | DIASTOLIC BLOOD PRESSURE: 80 MMHG | BODY MASS INDEX: 30.11 KG/M2 | SYSTOLIC BLOOD PRESSURE: 170 MMHG | HEART RATE: 63 BPM | WEIGHT: 164.6 LBS

## 2018-11-01 DIAGNOSIS — I10 ESSENTIAL HYPERTENSION, BENIGN: Primary | ICD-10-CM

## 2018-11-01 PROCEDURE — 99213 OFFICE O/P EST LOW 20 MIN: CPT | Performed by: FAMILY MEDICINE

## 2018-11-01 PROCEDURE — G8510 SCR DEP NEG, NO PLAN REQD: HCPCS | Performed by: FAMILY MEDICINE

## 2018-11-01 RX ORDER — OLMESARTAN MEDOXOMIL 40 MG/1
40 TABLET ORAL DAILY
Qty: 30 TABLET | Refills: 3 | Status: SHIPPED | OUTPATIENT
Start: 2018-11-01 | End: 2018-11-08

## 2018-11-01 ASSESSMENT — PATIENT HEALTH QUESTIONNAIRE - PHQ9
SUM OF ALL RESPONSES TO PHQ QUESTIONS 1-9: 0
2. FEELING DOWN, DEPRESSED OR HOPELESS: 0
SUM OF ALL RESPONSES TO PHQ QUESTIONS 1-9: 0

## 2018-11-01 ASSESSMENT — ENCOUNTER SYMPTOMS
RESPIRATORY NEGATIVE: 1
GASTROINTESTINAL NEGATIVE: 1

## 2018-11-01 NOTE — PATIENT INSTRUCTIONS
meals using beans and peas. Add garbanzo or kidney beans to salads. Make burritos and tacos with mashed weinstein beans or black beans. Where can you learn more? Go to https://chpilareb.InPact.[email or web address] org and sign in to your IV Diagnosticst account. Enter A246 in the Equiphon box to learn more about \"DASH Diet: Care Instructions. \"     If you do not have an account, please click on the \"Sign Up Now\" link. Current as of: December 6, 2017  Content Version: 11.7  © 5895-7726 [employer] Software, Incorporated. Care instructions adapted under license by [facility] ([facility]). If you have questions about a medical condition or this instruction, always ask your healthcare professional. [address] any warranty or liability for your use of this information.

## 2018-11-08 RX ORDER — OLMESARTAN MEDOXOMIL 40 MG/1
40 TABLET, FILM COATED ORAL DAILY
Qty: 90 TABLET | Refills: 3 | Status: SHIPPED | OUTPATIENT
Start: 2018-11-08 | End: 2019-10-16 | Stop reason: SDUPTHER

## 2018-12-18 ENCOUNTER — OFFICE VISIT (OUTPATIENT)
Dept: FAMILY MEDICINE CLINIC | Age: 78
End: 2018-12-18
Payer: MEDICARE

## 2018-12-18 VITALS
BODY MASS INDEX: 29.45 KG/M2 | OXYGEN SATURATION: 98 % | WEIGHT: 161 LBS | SYSTOLIC BLOOD PRESSURE: 158 MMHG | DIASTOLIC BLOOD PRESSURE: 76 MMHG | HEART RATE: 66 BPM

## 2018-12-18 DIAGNOSIS — I10 ESSENTIAL HYPERTENSION, BENIGN: Primary | ICD-10-CM

## 2018-12-18 PROCEDURE — G8510 SCR DEP NEG, NO PLAN REQD: HCPCS | Performed by: FAMILY MEDICINE

## 2018-12-18 PROCEDURE — 99213 OFFICE O/P EST LOW 20 MIN: CPT | Performed by: FAMILY MEDICINE

## 2018-12-18 RX ORDER — HYDROCHLOROTHIAZIDE 12.5 MG/1
12.5 TABLET ORAL DAILY
Qty: 30 TABLET | Refills: 3 | Status: SHIPPED | OUTPATIENT
Start: 2018-12-18 | End: 2019-05-20 | Stop reason: SDUPTHER

## 2018-12-18 ASSESSMENT — PATIENT HEALTH QUESTIONNAIRE - PHQ9
SUM OF ALL RESPONSES TO PHQ QUESTIONS 1-9: 0
SUM OF ALL RESPONSES TO PHQ9 QUESTIONS 1 & 2: 0
1. LITTLE INTEREST OR PLEASURE IN DOING THINGS: 0
2. FEELING DOWN, DEPRESSED OR HOPELESS: 0
SUM OF ALL RESPONSES TO PHQ QUESTIONS 1-9: 0

## 2018-12-18 NOTE — PATIENT INSTRUCTIONS
A serving is 1 slice of bread, 1 ounce of dry cereal, or ½ cup of cooked rice, pasta, or cooked cereal. Try to choose whole-grain products as much as possible. · Limit lean meat, poultry, and fish to 2 servings each day. A serving is 3 ounces, about the size of a deck of cards. · Eat 4 to 5 servings of nuts, seeds, and legumes (cooked dried beans, lentils, and split peas) each week. A serving is 1/3 cup of nuts, 2 tablespoons of seeds, or ½ cup of cooked beans or peas. · Limit fats and oils to 2 to 3 servings each day. A serving is 1 teaspoon of vegetable oil or 2 tablespoons of salad dressing. · Limit sweets and added sugars to 5 servings or less a week. A serving is 1 tablespoon jelly or jam, ½ cup sorbet, or 1 cup of lemonade. · Eat less than 2,300 milligrams (mg) of sodium a day. If you limit your sodium to 1,500 mg a day, you can lower your blood pressure even more. Tips for success  · Start small. Do not try to make dramatic changes to your diet all at once. You might feel that you are missing out on your favorite foods and then be more likely to not follow the plan. Make small changes, and stick with them. Once those changes become habit, add a few more changes. · Try some of the following:  ? Make it a goal to eat a fruit or vegetable at every meal and at snacks. This will make it easy to get the recommended amount of fruits and vegetables each day. ? Try yogurt topped with fruit and nuts for a snack or healthy dessert. ? Add lettuce, tomato, cucumber, and onion to sandwiches. ? Combine a ready-made pizza crust with low-fat mozzarella cheese and lots of vegetable toppings. Try using tomatoes, squash, spinach, broccoli, carrots, cauliflower, and onions. ? Have a variety of cut-up vegetables with a low-fat dip as an appetizer instead of chips and dip. ? Sprinkle sunflower seeds or chopped almonds over salads. Or try adding chopped walnuts or almonds to cooked vegetables.   ? Try some vegetarian meals using beans and peas. Add garbanzo or kidney beans to salads. Make burritos and tacos with mashed weinstein beans or black beans. Where can you learn more? Go to https://chpilareb.Genii [email or web address] org and sign in to your Dovo account. Enter P434 in the Easy Eye box to learn more about \"DASH Diet: Care Instructions. \"     If you do not have an account, please click on the \"Sign Up Now\" link. Current as of: December 6, 2017  Content Version: 11.8  © 6091-9351 Healthwise, Incorporated. Care instructions adapted under license by [facility] ([facility]). If you have questions about a medical condition or this instruction, always ask your healthcare professional. [address] any warranty or liability for your use of this information.

## 2018-12-18 NOTE — PROGRESS NOTES
hypertension.     Diagnoses and all orders for this visit:    Essential hypertension, benign  Needs better control  -   add  hydrochlorothiazide (HYDRODIURIL) 12.5 MG tablet;  Continue current treatment   Take 1 tablet by mouth daily  Home BP checks   Return 1 month

## 2019-01-07 RX ORDER — MELOXICAM 7.5 MG
TABLET ORAL
Qty: 90 TABLET | Refills: 3 | Status: SHIPPED | OUTPATIENT
Start: 2019-01-07 | End: 2019-03-18

## 2019-01-22 ENCOUNTER — OFFICE VISIT (OUTPATIENT)
Dept: FAMILY MEDICINE CLINIC | Age: 79
End: 2019-01-22
Payer: MEDICARE

## 2019-01-22 VITALS
HEART RATE: 68 BPM | HEIGHT: 62 IN | BODY MASS INDEX: 29.44 KG/M2 | OXYGEN SATURATION: 99 % | SYSTOLIC BLOOD PRESSURE: 120 MMHG | DIASTOLIC BLOOD PRESSURE: 62 MMHG | WEIGHT: 160 LBS

## 2019-01-22 DIAGNOSIS — I10 ESSENTIAL HYPERTENSION, BENIGN: Primary | ICD-10-CM

## 2019-01-22 DIAGNOSIS — E78.2 MIXED HYPERLIPIDEMIA: ICD-10-CM

## 2019-01-22 PROCEDURE — G8510 SCR DEP NEG, NO PLAN REQD: HCPCS | Performed by: FAMILY MEDICINE

## 2019-01-22 PROCEDURE — 99213 OFFICE O/P EST LOW 20 MIN: CPT | Performed by: FAMILY MEDICINE

## 2019-01-22 ASSESSMENT — PATIENT HEALTH QUESTIONNAIRE - PHQ9
SUM OF ALL RESPONSES TO PHQ QUESTIONS 1-9: 0
2. FEELING DOWN, DEPRESSED OR HOPELESS: 0
SUM OF ALL RESPONSES TO PHQ9 QUESTIONS 1 & 2: 0
1. LITTLE INTEREST OR PLEASURE IN DOING THINGS: 0
SUM OF ALL RESPONSES TO PHQ QUESTIONS 1-9: 0

## 2019-01-23 DIAGNOSIS — K21.9 GASTROESOPHAGEAL REFLUX DISEASE, ESOPHAGITIS PRESENCE NOT SPECIFIED: ICD-10-CM

## 2019-01-23 RX ORDER — OMEPRAZOLE 20 MG/1
CAPSULE, DELAYED RELEASE ORAL
Qty: 30 CAPSULE | Refills: 11 | Status: SHIPPED | OUTPATIENT
Start: 2019-01-23 | End: 2020-01-27

## 2019-03-07 DIAGNOSIS — E78.2 MIXED HYPERLIPIDEMIA: ICD-10-CM

## 2019-03-07 DIAGNOSIS — I10 ESSENTIAL HYPERTENSION, BENIGN: ICD-10-CM

## 2019-03-07 LAB
A/G RATIO: 1.8 (ref 1.1–2.2)
ALBUMIN SERPL-MCNC: 4.5 G/DL (ref 3.4–5)
ALP BLD-CCNC: 93 U/L (ref 40–129)
ALT SERPL-CCNC: 10 U/L (ref 10–40)
ANION GAP SERPL CALCULATED.3IONS-SCNC: 12 MMOL/L (ref 3–16)
AST SERPL-CCNC: 17 U/L (ref 15–37)
BASOPHILS ABSOLUTE: 0 K/UL (ref 0–0.2)
BASOPHILS RELATIVE PERCENT: 0.6 %
BILIRUB SERPL-MCNC: 0.6 MG/DL (ref 0–1)
BUN BLDV-MCNC: 26 MG/DL (ref 7–20)
CALCIUM SERPL-MCNC: 9.7 MG/DL (ref 8.3–10.6)
CHLORIDE BLD-SCNC: 107 MMOL/L (ref 99–110)
CHOLESTEROL, TOTAL: 150 MG/DL (ref 0–199)
CO2: 24 MMOL/L (ref 21–32)
CREAT SERPL-MCNC: 1.3 MG/DL (ref 0.6–1.2)
EOSINOPHILS ABSOLUTE: 0.2 K/UL (ref 0–0.6)
EOSINOPHILS RELATIVE PERCENT: 2.5 %
GFR AFRICAN AMERICAN: 48
GFR NON-AFRICAN AMERICAN: 40
GLOBULIN: 2.5 G/DL
GLUCOSE BLD-MCNC: 108 MG/DL (ref 70–99)
HCT VFR BLD CALC: 36 % (ref 36–48)
HDLC SERPL-MCNC: 52 MG/DL (ref 40–60)
HEMOGLOBIN: 12 G/DL (ref 12–16)
LDL CHOLESTEROL CALCULATED: 67 MG/DL
LYMPHOCYTES ABSOLUTE: 1.7 K/UL (ref 1–5.1)
LYMPHOCYTES RELATIVE PERCENT: 27.1 %
MCH RBC QN AUTO: 29.4 PG (ref 26–34)
MCHC RBC AUTO-ENTMCNC: 33.2 G/DL (ref 31–36)
MCV RBC AUTO: 88.5 FL (ref 80–100)
MONOCYTES ABSOLUTE: 0.5 K/UL (ref 0–1.3)
MONOCYTES RELATIVE PERCENT: 7.5 %
NEUTROPHILS ABSOLUTE: 3.9 K/UL (ref 1.7–7.7)
NEUTROPHILS RELATIVE PERCENT: 62.3 %
PDW BLD-RTO: 14.3 % (ref 12.4–15.4)
PLATELET # BLD: 272 K/UL (ref 135–450)
PMV BLD AUTO: 8.5 FL (ref 5–10.5)
POTASSIUM SERPL-SCNC: 4.9 MMOL/L (ref 3.5–5.1)
RBC # BLD: 4.07 M/UL (ref 4–5.2)
SODIUM BLD-SCNC: 143 MMOL/L (ref 136–145)
T4 FREE: 1.2 NG/DL (ref 0.9–1.8)
TOTAL PROTEIN: 7 G/DL (ref 6.4–8.2)
TRIGL SERPL-MCNC: 156 MG/DL (ref 0–150)
TSH REFLEX: 4.28 UIU/ML (ref 0.27–4.2)
VLDLC SERPL CALC-MCNC: 31 MG/DL
WBC # BLD: 6.3 K/UL (ref 4–11)

## 2019-03-18 ENCOUNTER — OFFICE VISIT (OUTPATIENT)
Dept: FAMILY MEDICINE CLINIC | Age: 79
End: 2019-03-18
Payer: MEDICARE

## 2019-03-18 VITALS
BODY MASS INDEX: 32.42 KG/M2 | WEIGHT: 160.8 LBS | SYSTOLIC BLOOD PRESSURE: 158 MMHG | HEART RATE: 70 BPM | HEIGHT: 59 IN | DIASTOLIC BLOOD PRESSURE: 68 MMHG | OXYGEN SATURATION: 99 %

## 2019-03-18 DIAGNOSIS — M17.10 ARTHRITIS OF KNEE: ICD-10-CM

## 2019-03-18 DIAGNOSIS — E78.2 MIXED HYPERLIPIDEMIA: ICD-10-CM

## 2019-03-18 DIAGNOSIS — Z00.00 ROUTINE GENERAL MEDICAL EXAMINATION AT A HEALTH CARE FACILITY: ICD-10-CM

## 2019-03-18 DIAGNOSIS — R73.01 IMPAIRED FASTING BLOOD SUGAR: ICD-10-CM

## 2019-03-18 DIAGNOSIS — N28.9 ABNORMAL RENAL FUNCTION: ICD-10-CM

## 2019-03-18 DIAGNOSIS — Z00.00 MEDICARE ANNUAL WELLNESS VISIT, SUBSEQUENT: Primary | ICD-10-CM

## 2019-03-18 DIAGNOSIS — I10 ESSENTIAL HYPERTENSION, BENIGN: ICD-10-CM

## 2019-03-18 LAB — HBA1C MFR BLD: 5.9 %

## 2019-03-18 PROCEDURE — 83036 HEMOGLOBIN GLYCOSYLATED A1C: CPT | Performed by: FAMILY MEDICINE

## 2019-03-18 PROCEDURE — G0439 PPPS, SUBSEQ VISIT: HCPCS | Performed by: FAMILY MEDICINE

## 2019-03-18 ASSESSMENT — ANXIETY QUESTIONNAIRES
GAD7 TOTAL SCORE: 0
GAD7 TOTAL SCORE: 0

## 2019-03-18 ASSESSMENT — LIFESTYLE VARIABLES: HOW OFTEN DO YOU HAVE A DRINK CONTAINING ALCOHOL: 0

## 2019-03-27 ENCOUNTER — TELEPHONE (OUTPATIENT)
Dept: FAMILY MEDICINE CLINIC | Age: 79
End: 2019-03-27

## 2019-03-27 RX ORDER — AZITHROMYCIN 250 MG/1
TABLET, FILM COATED ORAL
Qty: 6 TABLET | Refills: 0 | Status: SHIPPED | OUTPATIENT
Start: 2019-03-27 | End: 2019-04-06

## 2019-03-29 ENCOUNTER — TELEPHONE (OUTPATIENT)
Dept: FAMILY MEDICINE CLINIC | Age: 79
End: 2019-03-29

## 2019-04-04 ENCOUNTER — TELEPHONE (OUTPATIENT)
Dept: FAMILY MEDICINE CLINIC | Age: 79
End: 2019-04-04

## 2019-04-04 RX ORDER — DOXYCYCLINE HYCLATE 100 MG
100 TABLET ORAL 2 TIMES DAILY
Qty: 20 TABLET | Refills: 0 | Status: SHIPPED | OUTPATIENT
Start: 2019-04-04 | End: 2019-04-14

## 2019-04-08 DIAGNOSIS — N28.9 ABNORMAL RENAL FUNCTION: ICD-10-CM

## 2019-04-08 LAB
ANION GAP SERPL CALCULATED.3IONS-SCNC: 13 MMOL/L (ref 3–16)
BUN BLDV-MCNC: 28 MG/DL (ref 7–20)
CALCIUM SERPL-MCNC: 9.4 MG/DL (ref 8.3–10.6)
CHLORIDE BLD-SCNC: 103 MMOL/L (ref 99–110)
CO2: 25 MMOL/L (ref 21–32)
CREAT SERPL-MCNC: 1.4 MG/DL (ref 0.6–1.2)
GFR AFRICAN AMERICAN: 44
GFR NON-AFRICAN AMERICAN: 36
GLUCOSE BLD-MCNC: 102 MG/DL (ref 70–99)
POTASSIUM SERPL-SCNC: 4.5 MMOL/L (ref 3.5–5.1)
SODIUM BLD-SCNC: 141 MMOL/L (ref 136–145)

## 2019-04-09 DIAGNOSIS — N18.30 CKD (CHRONIC KIDNEY DISEASE), STAGE III (HCC): Primary | ICD-10-CM

## 2019-04-16 ENCOUNTER — OFFICE VISIT (OUTPATIENT)
Dept: FAMILY MEDICINE CLINIC | Age: 79
End: 2019-04-16
Payer: MEDICARE

## 2019-04-16 VITALS
WEIGHT: 158 LBS | RESPIRATION RATE: 16 BRPM | DIASTOLIC BLOOD PRESSURE: 80 MMHG | HEART RATE: 79 BPM | HEIGHT: 59 IN | TEMPERATURE: 97.3 F | OXYGEN SATURATION: 97 % | SYSTOLIC BLOOD PRESSURE: 130 MMHG | BODY MASS INDEX: 31.85 KG/M2

## 2019-04-16 DIAGNOSIS — I10 ESSENTIAL HYPERTENSION, BENIGN: Primary | ICD-10-CM

## 2019-04-16 DIAGNOSIS — N18.30 CKD (CHRONIC KIDNEY DISEASE), STAGE III (HCC): ICD-10-CM

## 2019-04-16 PROCEDURE — 99213 OFFICE O/P EST LOW 20 MIN: CPT | Performed by: FAMILY MEDICINE

## 2019-04-16 ASSESSMENT — ENCOUNTER SYMPTOMS
RESPIRATORY NEGATIVE: 1
BACK PAIN: 1

## 2019-04-16 NOTE — PATIENT INSTRUCTIONS
Patient Education        Home Blood Pressure Test: About This Test  What is it? A home blood pressure test allows you to keep track of your blood pressure at home. Blood pressure is a measure of the force of blood against the walls of your arteries. Blood pressure readings include two numbers, such as 130/80 (say \"130 over 80\"). The first number is the systolic pressure. The second number is the diastolic pressure. Why is this test done? You may do this test at home to:  · Find out if you have high blood pressure. · Track your blood pressure if you have high blood pressure. · Track how well medicine is working to reduce high blood pressure. · Check how lifestyle changes, such as weight loss and exercise, are affecting blood pressure. How can you prepare for the test?  · Do not use caffeine, tobacco, or medicines known to raise blood pressure (such as nasal decongestant sprays) for at least 30 minutes before taking your blood pressure. · Do not exercise for at least 30 minutes before taking your blood pressure. What happens before the test?  Take your blood pressure while you feel comfortable and relaxed. Sit quietly with both feet on the floor for at least 5 minutes before the test.  What happens during the test?  · Sit with your arm slightly bent and resting on a table so that your upper arm is at the same level as your heart. · Roll up your sleeve or take off your shirt to expose your upper arm. · Wrap the blood pressure cuff around your upper arm so that the lower edge of the cuff is about 1 inch above the bend of your elbow. Proceed with the following steps depending on if you are using an automatic or manual pressure monitor. Automatic blood pressure monitors  · Press the on/off button on the automatic monitor and wait until the ready-to-measure \"heart\" symbol appears next to zero in the display window. · Press the start button. The cuff will inflate and deflate by itself.   · Your blood July 22, 2018  Content Version: 11.9  © 4642-9382 Metrolight, Incorporated. Care instructions adapted under license by [facility] ([facility]). If you have questions about a medical condition or this instruction, always ask your healthcare professional. [address] any warranty or liability for your use of this information.

## 2019-04-16 NOTE — PROGRESS NOTES
Subjective:      Patient ID: Ousmane Hector is a 70 y.o. female. HPI  Hypertension: Patient here for follow-up of elevated blood pressure. Blood pressure is not checked at home. Patient denies chest pain, dyspnea and irregular heart beat. She denies side effects from medication. States she stopped HCTZ last week due to possibility of influencing kidneys. CKD: Creatinine 0.8 --> 1.3--> 1.4. She stopped Mobic per our instructions. Review of Systems   Constitutional: Positive for activity change (some change due to back). Respiratory: Negative. Cardiovascular: Negative. Gastrointestinal:        Hemorrhoid issues a few weeks ago and better with treatment    Genitourinary: Negative. Musculoskeletal: Positive for back pain (has been worse since off Mobic). Neurological: Negative. Objective:   Physical Exam   Constitutional: She is oriented to person, place, and time. No distress. Neck: Neck supple. Carotid bruit is not present. No thyromegaly present. Cardiovascular: Normal rate and regular rhythm. Pulmonary/Chest: Effort normal and breath sounds normal. She has no wheezes. She has no rales. Musculoskeletal: She exhibits no edema. Lymphadenopathy:     She has no cervical adenopathy. Neurological: She is alert and oriented to person, place, and time. Skin: Skin is warm and dry. Psychiatric: She has a normal mood and affect. Her behavior is normal. Judgment and thought content normal.     Assessment/Plan:    Kelin Bush was seen today for other. Diagnoses and all orders for this visit:    Essential hypertension, benign  Borderline/recently well controlled. Continue current treatment. As noted above the patient has stopped the hydrochlorothiazide. Return for blood pressure check in 2 months plus home blood pressure checks.   CKD (chronic kidney disease), stage III ([address]  -     US RENAL COMPLETE; Future  Patient has already been referred to nephrologist. She states she has an appointment for early June. It was reinforced with the patient to avoid NSAIDs.          Kellie Enrique MD

## 2019-05-02 DIAGNOSIS — E78.5 HYPERLIPIDEMIA, UNSPECIFIED HYPERLIPIDEMIA TYPE: ICD-10-CM

## 2019-05-03 RX ORDER — FLUVASTATIN SODIUM 80 MG/1
TABLET, EXTENDED RELEASE ORAL
Qty: 30 TABLET | Refills: 11 | Status: SHIPPED | OUTPATIENT
Start: 2019-05-03 | End: 2020-05-11

## 2019-05-07 ENCOUNTER — TELEPHONE (OUTPATIENT)
Dept: FAMILY MEDICINE CLINIC | Age: 79
End: 2019-05-07

## 2019-05-07 NOTE — TELEPHONE ENCOUNTER
Her ultrasound was normal however her lab test is abnormal. She should see the kidney specialist and keep her appointment in June.

## 2019-05-07 NOTE — TELEPHONE ENCOUNTER
Patient was wondering if she still needs to see kidney specialist since her test results came back ok. Appointment is schedule for June 7.

## 2019-05-20 DIAGNOSIS — I10 ESSENTIAL HYPERTENSION, BENIGN: ICD-10-CM

## 2019-05-20 RX ORDER — HYDROCHLOROTHIAZIDE 12.5 MG/1
12.5 TABLET ORAL DAILY
Qty: 30 TABLET | Refills: 3 | Status: SHIPPED | OUTPATIENT
Start: 2019-05-20 | End: 2019-10-07 | Stop reason: SDUPTHER

## 2019-06-07 PROBLEM — R94.31 NONSPECIFIC ABNORMAL ELECTROCARDIOGRAM (ECG) (EKG): Status: ACTIVE | Noted: 2018-11-08

## 2019-06-07 PROBLEM — H25.13 AGE-RELATED NUCLEAR CATARACT OF BOTH EYES: Status: ACTIVE | Noted: 2019-06-07

## 2019-06-07 PROBLEM — H04.129 DRY EYE SYNDROME: Status: ACTIVE | Noted: 2017-09-05

## 2019-06-07 PROBLEM — H11.31 CONJUNCTIVAL HEMORRHAGE OF RIGHT EYE: Status: ACTIVE | Noted: 2019-06-07

## 2019-06-07 PROBLEM — H02.052: Status: ACTIVE | Noted: 2019-06-07

## 2019-06-07 PROBLEM — R07.9 CHEST PAIN: Status: ACTIVE | Noted: 2018-11-08

## 2019-06-07 PROBLEM — D48.7 NEOPLASM OF UNCERTAIN BEHAVIOR OF OTHER SPECIFIED SITES: Status: ACTIVE | Noted: 2019-06-07

## 2019-07-16 ENCOUNTER — OFFICE VISIT (OUTPATIENT)
Dept: FAMILY MEDICINE CLINIC | Age: 79
End: 2019-07-16
Payer: MEDICARE

## 2019-07-16 VITALS
OXYGEN SATURATION: 99 % | HEART RATE: 71 BPM | BODY MASS INDEX: 28.02 KG/M2 | DIASTOLIC BLOOD PRESSURE: 70 MMHG | SYSTOLIC BLOOD PRESSURE: 122 MMHG | WEIGHT: 153.2 LBS

## 2019-07-16 DIAGNOSIS — I10 ESSENTIAL HYPERTENSION, BENIGN: Primary | ICD-10-CM

## 2019-07-16 DIAGNOSIS — N18.30 CKD (CHRONIC KIDNEY DISEASE), STAGE III (HCC): ICD-10-CM

## 2019-07-16 PROCEDURE — 99213 OFFICE O/P EST LOW 20 MIN: CPT | Performed by: NURSE PRACTITIONER

## 2019-07-16 ASSESSMENT — ENCOUNTER SYMPTOMS: SHORTNESS OF BREATH: 0

## 2019-07-16 NOTE — PROGRESS NOTES
SUBJECTIVE:  Pt is a of 70 y.o. female comes in today with   Chief Complaint   Patient presents with    Hypertension       Patient presenting today for evaluation of HTN  Hypertension:  Home blood pressure monitoring: Yes - low 100's/60. She is adherent to a low sodium diet. Patient denies chest pain, shortness of breath, headache, lightheadedness, blurred vision, peripheral edema, palpitations and dry cough. Antihypertensive medication side effects: no medication side effects noted. Use of agents associated with hypertension: none. She is  exercising regularly- walking. Is the patient taking any over the counter medications? Yes   If yes, see med list as above  Is the patient taking a daily aspirin? Yes    CKD: eval with Dr Derrek Boeck 6/7/19- no changes to medications at that time. Pt states she has still been taking HCTZ 12.5 mg. No longer with swelling in legs. Renal ultrasound completed 7/1/19      Prior to Visit Medications    Medication Sig Taking? Authorizing Provider   hydrochlorothiazide (HYDRODIURIL) 12.5 MG tablet TAKE 1 TABLET BY MOUTH DAILY Yes Carroll Zavala MD   LESCOL XL 80 MG extended release tablet TAKE ONE TABLET BY MOUTH EVERY DAY Yes Carroll Zavala MD   carboxymethylcellulose 1 % ophthalmic solution 1 drop 3 times daily Yes Historical Provider, MD   omeprazole (PRILOSEC) 20 MG delayed release capsule TAKE ONE CAPSULE BY MOUTH EVERY MORNING Yes Carroll Zavala MD   BENICAR 40 MG tablet Take 1 tablet by mouth daily Yes Carroll Zavala MD   diazepam (VALIUM) 5 MG tablet 1 po 30 min before procedure prn anxiety Yes Carroll Zavala MD   aspirin EC 81 MG EC tablet Take 81 mg by mouth. Yes Historical Provider, MD   Omega-3 Fatty Acids (OMEGA 3 PO) Take  by mouth.  Yes Historical Provider, MD   celecoxib (CELEBREX) 200 MG capsule Take 200 mg by mouth as needed  Yes Historical Provider, MD         Patient's allergies, past medical, surgical, social and family histories werereviewed and updated as

## 2019-08-20 ENCOUNTER — OFFICE VISIT (OUTPATIENT)
Dept: FAMILY MEDICINE CLINIC | Age: 79
End: 2019-08-20
Payer: MEDICARE

## 2019-08-20 VITALS
WEIGHT: 154.6 LBS | OXYGEN SATURATION: 98 % | BODY MASS INDEX: 25.73 KG/M2 | HEART RATE: 62 BPM | DIASTOLIC BLOOD PRESSURE: 52 MMHG | SYSTOLIC BLOOD PRESSURE: 108 MMHG

## 2019-08-20 DIAGNOSIS — I10 ESSENTIAL HYPERTENSION, BENIGN: Primary | ICD-10-CM

## 2019-08-20 DIAGNOSIS — R21 RASH: ICD-10-CM

## 2019-08-20 DIAGNOSIS — N18.30 CKD (CHRONIC KIDNEY DISEASE), STAGE III (HCC): ICD-10-CM

## 2019-08-20 PROCEDURE — 99213 OFFICE O/P EST LOW 20 MIN: CPT | Performed by: FAMILY MEDICINE

## 2019-08-20 RX ORDER — CLOTRIMAZOLE AND BETAMETHASONE DIPROPIONATE 10; .64 MG/G; MG/G
CREAM TOPICAL 2 TIMES DAILY
Qty: 60 G | Refills: 3 | Status: SHIPPED | OUTPATIENT
Start: 2019-08-20 | End: 2021-06-15

## 2019-08-20 NOTE — PROGRESS NOTES
rhythm. Ext[de-identified] No edema  Lab review: 6/2019. Assessment/Plan:    Jenae Castellon was seen today for blood pressure check. Diagnoses and all orders for this visit:    Essential hypertension, benign  Stable/Controlled  Continue current treatment  Home BP checks  Return 3 months    Rash  -     clotrimazole-betamethasone (LOTRISONE) 1-0.05 % cream; Apply topically 2 times daily Apply topically 2 times daily.     CKD (chronic kidney disease), stage III ([address]  Stable/Controlled  Continue current treatment   Follow up with Nephrologist

## 2019-09-13 ENCOUNTER — TELEPHONE (OUTPATIENT)
Dept: FAMILY MEDICINE CLINIC | Age: 79
End: 2019-09-13

## 2019-09-14 ENCOUNTER — TELEPHONE (OUTPATIENT)
Dept: FAMILY MEDICINE CLINIC | Age: 79
End: 2019-09-14

## 2019-09-16 RX ORDER — AMOXICILLIN 500 MG/1
500 CAPSULE ORAL 3 TIMES DAILY
Qty: 30 CAPSULE | Refills: 0 | Status: SHIPPED | OUTPATIENT
Start: 2019-09-16 | End: 2019-09-26

## 2019-10-07 ENCOUNTER — TELEPHONE (OUTPATIENT)
Dept: FAMILY MEDICINE CLINIC | Age: 79
End: 2019-10-07

## 2019-10-07 DIAGNOSIS — I10 ESSENTIAL HYPERTENSION, BENIGN: ICD-10-CM

## 2019-10-07 RX ORDER — HYDROCHLOROTHIAZIDE 12.5 MG/1
TABLET ORAL
Qty: 90 TABLET | Refills: 2 | Status: SHIPPED | OUTPATIENT
Start: 2019-10-07 | End: 2020-07-20

## 2019-10-17 RX ORDER — OLMESARTAN MEDOXOMIL 40 MG/1
TABLET, FILM COATED ORAL
Qty: 90 TABLET | Refills: 1 | Status: SHIPPED | OUTPATIENT
Start: 2019-10-17 | End: 2019-10-25

## 2019-11-01 ENCOUNTER — TELEPHONE (OUTPATIENT)
Dept: FAMILY MEDICINE CLINIC | Age: 79
End: 2019-11-01

## 2019-12-17 ENCOUNTER — OFFICE VISIT (OUTPATIENT)
Dept: FAMILY MEDICINE CLINIC | Age: 79
End: 2019-12-17
Payer: MEDICARE

## 2019-12-17 VITALS
DIASTOLIC BLOOD PRESSURE: 50 MMHG | OXYGEN SATURATION: 99 % | SYSTOLIC BLOOD PRESSURE: 114 MMHG | HEART RATE: 66 BPM | WEIGHT: 154.8 LBS | BODY MASS INDEX: 28.31 KG/M2

## 2019-12-17 DIAGNOSIS — E78.5 HYPERLIPIDEMIA, UNSPECIFIED HYPERLIPIDEMIA TYPE: ICD-10-CM

## 2019-12-17 DIAGNOSIS — I10 ESSENTIAL HYPERTENSION, BENIGN: Primary | ICD-10-CM

## 2019-12-17 DIAGNOSIS — M25.562 CHRONIC PAIN OF BOTH KNEES: ICD-10-CM

## 2019-12-17 DIAGNOSIS — M25.561 CHRONIC PAIN OF BOTH KNEES: ICD-10-CM

## 2019-12-17 DIAGNOSIS — G89.29 CHRONIC PAIN OF BOTH KNEES: ICD-10-CM

## 2019-12-17 PROCEDURE — 99213 OFFICE O/P EST LOW 20 MIN: CPT | Performed by: FAMILY MEDICINE

## 2019-12-17 RX ORDER — OLMESARTAN MEDOXOMIL 20 MG/1
TABLET, FILM COATED ORAL
Refills: 3 | COMMUNITY
Start: 2019-11-25 | End: 2020-03-04

## 2020-01-27 RX ORDER — OMEPRAZOLE 20 MG/1
CAPSULE, DELAYED RELEASE ORAL
Qty: 30 CAPSULE | Refills: 11 | Status: SHIPPED | OUTPATIENT
Start: 2020-01-27 | End: 2021-01-29

## 2020-03-03 NOTE — TELEPHONE ENCOUNTER
Medication and Quantity requested: BENICAR 20 MG tablet  #30    Last Visit  12/17/19    Pharmacy and phone number updated in EPIC:  Yes, [facility]

## 2020-03-04 RX ORDER — OLMESARTAN MEDOXOMIL 20 MG/1
20 TABLET ORAL DAILY
Qty: 90 TABLET | Refills: 3 | Status: SHIPPED | OUTPATIENT
Start: 2020-03-04 | End: 2020-06-29 | Stop reason: SDUPTHER

## 2020-04-06 ENCOUNTER — TELEPHONE (OUTPATIENT)
Dept: FAMILY MEDICINE CLINIC | Age: 80
End: 2020-04-06

## 2020-04-06 NOTE — TELEPHONE ENCOUNTER
[facility] faxed a med change authorization for benicar 20mg,  Requesting to change to olmesartan medoxomil tabs 20mg, savings of $230.40 a year for patient. If ok with you please send over new rx .     (form scanned into chart)

## 2020-04-06 NOTE — TELEPHONE ENCOUNTER
I am fine with sending the generic olmesartan however if the patient wants tradename Benicar then I would stay with the tradename Benicar if she wants to change to the generic then li up a prescription and send back

## 2020-05-11 ENCOUNTER — TELEPHONE (OUTPATIENT)
Dept: FAMILY MEDICINE CLINIC | Age: 80
End: 2020-05-11

## 2020-05-11 NOTE — TELEPHONE ENCOUNTER
Form rec'd from [facility] asking to change benicar to generic for a savings of $230.40 per year.  Form scanned into chart

## 2020-05-14 NOTE — TELEPHONE ENCOUNTER
It was the insurance plan or pharmacy making the request, not the patient. You can close this encounter. No need to send a letter. assumed presence of pain

## 2020-06-29 RX ORDER — OLMESARTAN MEDOXOMIL 20 MG/1
20 TABLET ORAL DAILY
Qty: 90 TABLET | Refills: 1 | Status: SHIPPED | OUTPATIENT
Start: 2020-06-29 | End: 2021-03-01

## 2020-06-29 NOTE — TELEPHONE ENCOUNTER
Medication and Quantity requested: olmesartan (BENICAR) 20 MG tablet          Last Visit  12/17/19    Pharmacy and phone number updated in EPIC:  Yes  [facility]

## 2020-07-20 RX ORDER — HYDROCHLOROTHIAZIDE 12.5 MG/1
TABLET ORAL
Qty: 90 TABLET | Refills: 1 | Status: SHIPPED | OUTPATIENT
Start: 2020-07-20 | End: 2021-01-05

## 2020-07-20 NOTE — TELEPHONE ENCOUNTER
Medication:   Requested Prescriptions     Pending Prescriptions Disp Refills    hydrochlorothiazide (HYDRODIURIL) 12.5 MG tablet [Pharmacy Med Name: hydrochlorothiazide 12.5 mg tablet] 90 tablet 2     Sig: TAKE ONE Tablet BY MOUTH DAILY       Last Filled:  10/7/19 #90, 2  RF     Patient Phone Number: [phone] (home)     Last appt: 12/17/2019 HTN, knee pain   Next appt: Visit date not found    Lab Results   Component Value Date     06/16/2020    K 3.8 06/16/2020     06/16/2020    CO2 23 (L) 06/16/2020    BUN 23 06/16/2020    CREATININE 1.45 (H) 06/16/2020    GLUCOSE 101 (H) 06/16/2020    CALCIUM 9.0 06/16/2020    PROT 6.90 06/18/2019    LABALBU 3.8 06/16/2020    BILITOT 0.6 03/07/2019    ALKPHOS 93 03/07/2019    AST 17 03/07/2019    ALT 10 03/07/2019    LABGLOM 34 (L) 06/16/2020    GFRAA 39 (L) 06/16/2020    AGRATIO 1.28 06/18/2019    GLOB 2.5 03/07/2019

## 2020-08-17 RX ORDER — FLUVASTATIN SODIUM 80 MG/1
TABLET, EXTENDED RELEASE ORAL
Qty: 90 TABLET | Refills: 0 | Status: SHIPPED | OUTPATIENT
Start: 2020-08-17 | End: 2020-11-16

## 2020-08-18 NOTE — TELEPHONE ENCOUNTER
Pt returned call and was advised of previous notes. Pt states she will go to [facility] to have her blood drawn.

## 2020-08-19 DIAGNOSIS — E78.2 MIXED HYPERLIPIDEMIA: ICD-10-CM

## 2020-08-19 LAB
A/G RATIO: 1.7 (ref 1.1–2.2)
ALBUMIN SERPL-MCNC: 4.2 G/DL (ref 3.4–5)
ALP BLD-CCNC: 63 U/L (ref 40–129)
ALT SERPL-CCNC: 13 U/L (ref 10–40)
ANION GAP SERPL CALCULATED.3IONS-SCNC: 10 MMOL/L (ref 3–16)
AST SERPL-CCNC: 16 U/L (ref 15–37)
BILIRUB SERPL-MCNC: 0.5 MG/DL (ref 0–1)
BUN BLDV-MCNC: 29 MG/DL (ref 7–20)
CALCIUM SERPL-MCNC: 9.3 MG/DL (ref 8.3–10.6)
CHLORIDE BLD-SCNC: 107 MMOL/L (ref 99–110)
CHOLESTEROL, TOTAL: 165 MG/DL (ref 0–199)
CO2: 23 MMOL/L (ref 21–32)
CREAT SERPL-MCNC: 1.2 MG/DL (ref 0.6–1.2)
GFR AFRICAN AMERICAN: 52
GFR NON-AFRICAN AMERICAN: 43
GLOBULIN: 2.5 G/DL
GLUCOSE BLD-MCNC: 99 MG/DL (ref 70–99)
HDLC SERPL-MCNC: 62 MG/DL (ref 40–60)
LDL CHOLESTEROL CALCULATED: 82 MG/DL
POTASSIUM SERPL-SCNC: 4.8 MMOL/L (ref 3.5–5.1)
SODIUM BLD-SCNC: 140 MMOL/L (ref 136–145)
TOTAL PROTEIN: 6.7 G/DL (ref 6.4–8.2)
TRIGL SERPL-MCNC: 105 MG/DL (ref 0–150)
VLDLC SERPL CALC-MCNC: 21 MG/DL

## 2020-09-02 ENCOUNTER — TELEPHONE (OUTPATIENT)
Dept: FAMILY MEDICINE CLINIC | Age: 80
End: 2020-09-02

## 2020-09-02 NOTE — TELEPHONE ENCOUNTER
----- Message from Carito Caceres sent at 9/2/2020  3:20 PM EDT -----  Subject: Message to Provider    QUESTIONS  Information for Provider? Patient is wondering if she should see   oncologist Tati Gusman at [facility] (she saw Radha Malave in an ad). The notches in her throat seem to be getting bigger. She wonders who she   should see for this issue. Please contact her. Thank you!   ---------------------------------------------------------------------------  --------------  CALL BACK INFO  What is the best way for the office to contact you? OK to leave message on   voicemail  Preferred Call Back Phone Number? [phone]  ---------------------------------------------------------------------------  --------------  SCRIPT ANSWERS  Relationship to Patient?  Self

## 2020-09-02 NOTE — TELEPHONE ENCOUNTER
I am not sure exactly what she is talking about however I would more likely see a head neck surgeon i.e. an ENT. She saw Dr. Parker Larry for this back in 2018. I am not sure what an oncologist would do. If there is no biopsy, there is nothing that they would treat other than to suggest doing a biopsy.   See me

## 2020-09-10 ENCOUNTER — OFFICE VISIT (OUTPATIENT)
Dept: ENT CLINIC | Age: 80
End: 2020-09-10
Payer: MEDICARE

## 2020-09-10 VITALS — TEMPERATURE: 97.8 F | HEART RATE: 87 BPM | SYSTOLIC BLOOD PRESSURE: 145 MMHG | DIASTOLIC BLOOD PRESSURE: 70 MMHG

## 2020-09-10 PROCEDURE — 99213 OFFICE O/P EST LOW 20 MIN: CPT | Performed by: OTOLARYNGOLOGY

## 2020-09-10 NOTE — PROGRESS NOTES
Patient has become concerned over the course of the past few weeks because she has felt like there is a difficulty in swallowing and that there is some swelling in the lower portion of her throat. However, she is not losing weight and she has had no fever. No extreme pain is been noted. She denies any GERD symptoms. She did have a previous history of some submandibular gland problem and she is concerned as to whether these may be the problem again. She has had no fever. She does not smoke. Currently, she appears in no acute distress. Ear examination reveals normal-appearing tympanic membranes and ear canals bilaterally. Nasal examination is basically unremarkable. Oral examination reveals no lesions and no inflammation. Indirect laryngoscopy reveals a normal-appearing epiglottis. The cords seem to be mobile and are uninvolved with inflammation. I could not adequately describe the post cricoid area. Piriform sinus and lips appear normal.  The neck itself is free of any adenopathy or mass lesion. The submandibular glands are mildly enlarged but are not inflamed nor tender nor nodular. The parotid glands are normal.  No mass lesion, thyroid enlargement is encountered. Trachea is midline. I have discussed with her the likelihood that this is totally a benign process and possibly could be related to reflux. However, I do feel that a barium swallow would be helpful to completely evaluate the problem. No medication will be given at this time until the barium swallow was evaluated.

## 2020-09-16 ENCOUNTER — TELEPHONE (OUTPATIENT)
Dept: ENT CLINIC | Age: 80
End: 2020-09-16

## 2020-11-02 ENCOUNTER — TELEPHONE (OUTPATIENT)
Dept: FAMILY MEDICINE CLINIC | Age: 80
End: 2020-11-02

## 2020-11-02 NOTE — TELEPHONE ENCOUNTER
[facility] states the patient's Benicar  is requiring a PA  Covermymed key- [id number]  ICD-10 - [employer]  Start PA  [employer] ins.

## 2020-11-02 NOTE — TELEPHONE ENCOUNTER
Submitted PA for Benicar 20MG tablets    Via CMM Key: [id number]     STATUS: approved   10/03/2020; Coverage End Date:11/02/2021      . Please notify patient, thank you.

## 2020-11-16 RX ORDER — FLUVASTATIN SODIUM 80 MG/1
TABLET, EXTENDED RELEASE ORAL
Qty: 90 TABLET | Refills: 0 | Status: SHIPPED | OUTPATIENT
Start: 2020-11-16 | End: 2021-02-15

## 2020-11-16 NOTE — TELEPHONE ENCOUNTER
Medication:   Requested Prescriptions     Pending Prescriptions Disp Refills    LESCOL XL 80 MG extended release tablet [Pharmacy Med Name: Lescol XL 80 mg tablet,extended release] 90 tablet 0     Sig: TAKE ONE TABLET BY MOUTH EVERY DAY        Last Filled:  8/17/20 #90, 0 RF     Patient Phone Number: [phone] (home)     Last appt: 12/17/19 HTN, knee pain   Next appt: Visit date not found

## 2020-11-20 ENCOUNTER — TELEPHONE (OUTPATIENT)
Dept: FAMILY MEDICINE CLINIC | Age: 80
End: 2020-11-20

## 2020-11-20 NOTE — TELEPHONE ENCOUNTER
----- Message from Julianninoveronica Irwin sent at 11/20/2020  9:09 AM EST -----  Subject: Appointment Request    Reason for Call: Routine Existing Condition Follow Up    QUESTIONS  Type of Appointment? Established Patient  Reason for appointment request? Available appointments did not meet   patient need  Additional Information for Provider? Patient has appointment scheduled for   12/3 virtual visit and she would like to be seen in person by Dr. Tevin Nguyen. No   in person visits showing on schedule.  ---------------------------------------------------------------------------  --------------  CALL BACK INFO  What is the best way for the office to contact you? OK to leave message on   voicemail  Preferred Call Back Phone Number? [phone]  ---------------------------------------------------------------------------  --------------  SCRIPT ANSWERS  Relationship to Patient? Self  Have your symptoms changed? No  Have you been diagnosed with   tested for   or told that you are suspected of having COVID-19 (Coronavirus)? No  Have you had a fever or taken medication to treat a fever within the past   3 days? No  Have you had a cough   shortness of breath or flu-like symptoms within the past 3 days? No  Do you currently have flu-like symptoms including fever or chills   cough   shortness of breath   or difficulty breathing   or new loss of taste or smell? No  (Service Expert  click yes below to proceed with G3 As Usual   Scheduling)?  Yes

## 2020-11-23 ENCOUNTER — TELEPHONE (OUTPATIENT)
Dept: FAMILY MEDICINE CLINIC | Age: 80
End: 2020-11-23

## 2020-11-23 ENCOUNTER — OFFICE VISIT (OUTPATIENT)
Dept: FAMILY MEDICINE CLINIC | Age: 80
End: 2020-11-23
Payer: MEDICARE

## 2020-11-23 VITALS
BODY MASS INDEX: 29.41 KG/M2 | WEIGHT: 160.8 LBS | DIASTOLIC BLOOD PRESSURE: 64 MMHG | HEART RATE: 56 BPM | SYSTOLIC BLOOD PRESSURE: 116 MMHG | OXYGEN SATURATION: 99 %

## 2020-11-23 PROCEDURE — G0439 PPPS, SUBSEQ VISIT: HCPCS | Performed by: FAMILY MEDICINE

## 2020-11-23 ASSESSMENT — PATIENT HEALTH QUESTIONNAIRE - PHQ9
SUM OF ALL RESPONSES TO PHQ QUESTIONS 1-9: 0
1. LITTLE INTEREST OR PLEASURE IN DOING THINGS: 0
2. FEELING DOWN, DEPRESSED OR HOPELESS: 0
SUM OF ALL RESPONSES TO PHQ QUESTIONS 1-9: 0
SUM OF ALL RESPONSES TO PHQ9 QUESTIONS 1 & 2: 0
SUM OF ALL RESPONSES TO PHQ QUESTIONS 1-9: 0

## 2020-11-23 ASSESSMENT — LIFESTYLE VARIABLES: HOW OFTEN DO YOU HAVE A DRINK CONTAINING ALCOHOL: 0

## 2020-11-23 NOTE — TELEPHONE ENCOUNTER
Patient was seen today by Dr. Rebecca Pope and given order for US Doppler venous leg, lt.   Patient is asking for this faxed to [place] [address] [phone]  Fax ASAP

## 2020-11-23 NOTE — TELEPHONE ENCOUNTER
----- Message from Bienvenido Michaellashay sent at 11/23/2020  2:56 PM EST -----  Subject: Message to Provider    QUESTIONS  Information for Provider? PT needs to have an [facility] of a sonogram [facility]   so they can do it. Fax #[phone]  ---------------------------------------------------------------------------  --------------  Neal SILVA  What is the best way for the office to contact you? OK to leave message on   voicemail  Preferred Call Back Phone Number? [phone]  ---------------------------------------------------------------------------  --------------  SCRIPT ANSWERS  Relationship to Patient?  Self

## 2020-11-23 NOTE — PATIENT INSTRUCTIONS
Personalized Preventive Plan for Uma Fowler - 11/23/2020  Medicare offers a range of preventive health benefits. Some of the tests and screenings are paid in full while other may be subject to a deductible, co-insurance, and/or copay. Some of these benefits include a comprehensive review of your medical history including lifestyle, illnesses that may run in your family, and various assessments and screenings as appropriate. After reviewing your medical record and screening and assessments performed today your provider may have ordered immunizations, labs, imaging, and/or referrals for you. A list of these orders (if applicable) as well as your Preventive Care list are included within your After Visit Summary for your review. Other Preventive Recommendations:    · A preventive eye exam performed by an eye specialist is recommended every 1-2 years to screen for glaucoma; cataracts, macular degeneration, and other eye disorders. · A preventive dental visit is recommended every 6 months. · Try to get at least 150 minutes of exercise per week or 10,000 steps per day on a pedometer . · Order or download the FREE \"Exercise & Physical Activity: Your Everyday Guide\" from [employer]. Call 1-164.355.3785 or search [employer] online. · You need 2727-9007 mg of calcium and 3720-1305 IU of vitamin D per day. It is possible to meet your calcium requirement with diet alone, but a vitamin D supplement is usually necessary to meet this goal.  · When exposed to the sun, use a sunscreen that protects against both UVA and UVB radiation with an SPF of 30 or greater. Reapply every 2 to 3 hours or after sweating, drying off with a towel, or swimming. · Always wear a seat belt when traveling in a car. Always wear a helmet when riding a bicycle or motorcycle.

## 2020-11-23 NOTE — PROGRESS NOTES
Medicare Annual Wellness Visit  Name: Stacy Dela Cruz Date: 2020   MRN: <[id number]> Sex: Female   Age: 68 y.o. Ethnicity: Non-/Non    : 1940 Race: Corby Raza is here for Medicare AWV and Swelling (L leg )    Screenings for behavioral, psychosocial and functional/safety risks, and cognitive dysfunction are all negative except as indicated below. These results, as well as other patient data from the [address] form, are documented in Flowsheets linked to this Encounter. No Known Allergies      Prior to Visit Medications    Medication Sig Taking? Authorizing Provider   LESCOL XL 80 MG extended release tablet TAKE ONE TABLET BY MOUTH EVERY DAY Yes Michelle Reyes MD   hydrochlorothiazide (HYDRODIURIL) 12.5 MG tablet TAKE ONE Tablet BY MOUTH DAILY Yes Michelle Reyes MD   omeprazole (PRILOSEC) 20 MG delayed release capsule TAKE ONE Capsule BY MOUTH EVERY MORNING Yes Michelle Reyes MD   clotrimazole-betamethasone (LOTRISONE) 1-0.05 % cream Apply topically 2 times daily Apply topically 2 times daily. Yes Michelle Reyes MD   carboxymethylcellulose 1 % ophthalmic solution 1 drop 3 times daily Yes Historical Provider, MD   diazepam (VALIUM) 5 MG tablet 1 po 30 min before procedure prn anxiety Yes Michelle Reyes MD   aspirin EC 81 MG EC tablet Take 81 mg by mouth. Yes Historical Provider, MD   Omega-3 Fatty Acids (OMEGA 3 PO) Take  by mouth.  Yes Historical Provider, MD   olmesartan (BENICAR) 20 MG tablet Take 1 tablet by mouth daily  Michelle Reyes MD         Past Medical History:   Diagnosis Date    Arthritis     Cancer [facility])     CKD (chronic kidney disease), stage III 2019    Colon polyps     Diverticulosis 2/15    CT finding    Diverticulosis of sigmoid     CT     Elevated cholesterol with high triglycerides     Essential hypertension, benign     Family history of colon cancer     Fibrocystic disease of breast     Hyperlipidemia     Rosacea     Spinal stenosis lumbar region        Past Surgical History:   Procedure Laterality Date    BLEPHAROPLASTY Bilateral 1/11; 3/12    [facility]    CHOLECYSTECTOMY, LAPAROSCOPIC  1/7/16    Dr Von Tello: BN    COLONOSCOPY  4/09,6/2011; 6/18/14    polyps, repeat 3 years; [facility] MOHS SURGERY Left 09/2017    Nodular Basal Cell L temple:         Family History   Problem Relation Age of Onset    Heart Disease Mother     Heart Attack Mother     Heart Disease Father     Heart Attack Father     Heart Disease Brother     Cancer Brother 54        colon    Cancer Other 36        son: colon    Heart Attack Other 58        4 total last age 76       CareTeam (Including outside providers/suppliers regularly involved in providing care):   Patient Care Team:  Kana Castillo MD as PCP - General (Family Medicine)  Kana Castillo MD as PCP [facility]  Pauline Patricio MD as Consulting Physician (Orthopedic Surgery)  Nicky Doan as Consulting Physician (Ophthalmology)  Chapo King MD as Consulting Physician (General Surgery)  Marizol Sarmiento MD as Consulting Physician (Otolaryngology)  Travis Umana MD as Consulting Physician (Nephrology)    Wt Readings from Last 3 Encounters:   11/23/20 160 lb 12.8 oz (72.9 kg)   12/17/19 154 lb 12.8 oz (70.2 kg)   10/25/19 155 lb (70.3 kg)     Vitals:    11/23/20 1335   BP: 116/64   Pulse: 56   SpO2: 99%   Weight: 160 lb 12.8 oz (72.9 kg)     Body mass index is 29.41 kg/m². Based upon direct observation of the patient, evaluation of cognition reveals recent and remote memory intact. Vitals:    11/23/20 1335   BP: 116/64   Pulse: 56   SpO2: 99%   Weight: 160 lb 12.8 oz (72.9 kg)     Body mass index is 29.41 kg/m².    General Appearance: alert and oriented, in no acute distress  Skin: warm and dry, no rash or erythema  Head: normocephalic and atraumatic  Eyes: pupils equal, round, and reactive to light, extraocular eye movements intact, conjunctivae normal  ENT: tympanic membrane, external ear and ear canal normal bilaterally, nose without deformity,   Neck: supple and non-tender without mass, no thyromegaly or thyroid nodules, no cervical lymphadenopathy  Pulmonary/Chest: clear to auscultation bilaterally- no wheezes, rales or rhonchi, normal air movement, no respiratory distress  Cardiovascular: normal rate, regular rhythm, normal S1 and S2, no murmurs, rubs, clicks, or gallops, no carotid bruits  Abdomen: soft, non-tender, non-distended, normal bowel sounds, no masses or organomegaly  Extremities: no cyanosis, clubbing or, 2+ edema edema L leg  Neurologic: reflexes normal and symmetric, no cranial nerve deficit, speech normal  GYN:Rectal: deferred to Gynecologist  Breast: deferred to Gynecologist     Patient's complete Health Risk Assessment and screening values have been reviewed and are found in Flowsheets. The following problems were reviewed today and where indicated follow up appointments were made and/or referrals ordered. Positive Risk Factor Screenings with Interventions:       General Health and ACP:  General  In general, how would you say your health is?: Very Good  In the past 7 days, have you experienced any of the following?  New or Increased Pain, New or Increased Fatigue, Loneliness, Social Isolation, Stress or Anger?: None of These  Do you get the social and emotional support that you need?: Yes  Do you have a Living Will?: Yes  Advance Directives     Power of  Living Will ACP-Advance Directive ACP-Power of     Not on File Not on File Filed [address] [place] [address] [place] Risk Interventions:  · no new issues    Health Habits/Nutrition:  Health Habits/Nutrition  Do you exercise for at least 20 minutes 2-3 times per week?: (!) No  Have you lost any weight without trying in the past 3 months?: No  Do you eat fewer than 2 meals per day?: No  Have you seen a dentist within the past year?: Yes     Health Habits/Nutrition Interventions:  · occasional exercise    Personalized Preventive Plan   Current Health Maintenance Status  Immunization History   Administered Date(s) Administered    Influenza Vaccine, unspecified formulation 11/08/2012    Influenza Virus Vaccine 10/05/2013, 10/01/2015    Influenza Whole 10/05/2013, 10/01/2015    Influenza, High Dose (Fluzone 65 yrs and older) 11/08/2012, 09/01/2020    Pneumococcal Conjugate 13-valent ([id number]) 02/26/2015    Pneumococcal Polysaccharide ([id number]) 11/08/2012    Td, unspecified formulation 01/21/2010    Zoster Live (Zostavax) 09/27/2013        Health Maintenance   Topic Date Due    DTaP/Tdap/Td vaccine (1 - Tdap) 01/27/1959    Shingles Vaccine (2 of 3) 11/22/2013    Annual Wellness Visit (AWV)  05/29/2019    Lipid screen  08/19/2021    Potassium monitoring  10/19/2021    Creatinine monitoring  10/19/2021    DEXA (modify frequency per FRAX score)  Completed    Flu vaccine  Completed    Pneumococcal 65+ years Vaccine  Completed    Hepatitis A vaccine  Aged Out    Hepatitis B vaccine  Aged Out    Hib vaccine  Aged Out    Meningococcal (ACWY) vaccine  Aged Out     Recommendations for [employer] Due: see orders and patient instructions/AVS.  . Recommended screening schedule for the next 5-10 years is provided to the patient in written form: see Patient Instructions/AVS.    Dariusz Finnegan was seen today for medicare awv and swelling. Diagnoses and all orders for this visit:    Medicare annual wellness visit, subsequent/Routine general medical examination at a health care facility  Return 1 year  Essential hypertension, benign  Well-controlled. Continue current treatment. Home blood pressure checks. Return 3-month  Mixed hyperlipidemia  Well-controlled on Lescol although patient states since this is not covered it is quite expensive.   She states she remembers taking other statins initially which cause side effects such as constipation, etc.  Stage 3a chronic kidney disease  Patient follows with Dr. Geeta Shannon  Pedal edema  -     [address] [place] [address]; Future          Health Maintenance reviewed with the patient: Shingrix was discussed and recommended.

## 2020-11-24 ENCOUNTER — TELEPHONE (OUTPATIENT)
Dept: FAMILY MEDICINE CLINIC | Age: 80
End: 2020-11-24

## 2020-11-24 NOTE — TELEPHONE ENCOUNTER
She may have to go off of her blood thinner for 48 hours prior to that.   Other than that the blood clot should have no effect

## 2020-11-24 NOTE — TELEPHONE ENCOUNTER
----- Message from Adela Mendoza sent at 11/24/2020 10:57 AM EST -----  Subject: Appointment Request    Reason for Call: Routine Existing Condition Follow Up (Diabetes)    QUESTIONS  Type of Appointment? Established Patient  Reason for appointment request? No appointments available during search  Additional Information for Provider? No in-person appointments available   for pt to return to office in approximately 2 weeks (around 12/13) for   followup on blood clot medication. Please call patient to schedule an   in-person with Dr. Alexandre Rojas only. Offered her CNP for 12/15 and she only wants   Dr. Alexandre Rojas.  ---------------------------------------------------------------------------  --------------  Primitivo SILVA  What is the best way for the office to contact you? OK to leave message on   voicemail  Preferred Call Back Phone Number? [phone]  ---------------------------------------------------------------------------  --------------  SCRIPT ANSWERS  Relationship to Patient? Self  Appointment reason? Well Care/Follow Ups  Select a Well Care/Follow Ups appointment reason? Adult Existing Condition   Follow Up [Diabetes   CHF   COPD   Hypertension/Blood Pressure Check]  (Is the patient requesting to be seen urgently for their symptoms?)? No  Is this follow up request related to routine Diabetes Management? Yes  Are you having any new concerns about your existing condition? No  Have you been diagnosed with   tested for   or told that you are suspected of having COVID-19 (Coronavirus)? No  Have you had a fever or taken medication to treat a fever within the past   3 days? No  Have you had a cough   shortness of breath or flu-like symptoms within the past 3 days? No  Do you currently have flu-like symptoms including fever or chills   cough   shortness of breath   or difficulty breathing   or new loss of taste or smell? No  (Service Expert  click yes below to proceed with [employer] [facility]   Scheduling)?  Yes

## 2020-11-24 NOTE — TELEPHONE ENCOUNTER
Two encounters   Hailey DODGE [facility] Practice Support               Subject: Message to Provider     QUESTIONS   Information for Provider? Pt would also like to know if it is okay to take   aspirin with the apixaban (ELIQUIS) 5 MG TABS tablets. Should pt take   something in place of Eliquis before her tooth extraction    or take nothing at all to replace.  PLEASE ADVISE ASAP   ---------------------------------------------------------------------------

## 2020-11-24 NOTE — TELEPHONE ENCOUNTER
Note      ----- [facility] sent at 11/24/2020 10:57 AM EST -----  Subject: Appointment Request     Reason for Call: Routine Existing Condition Follow Up (Diabetes)     QUESTIONS  Type of Appointment? Established Patient  Reason for appointment request? No appointments available during search  Additional Information for Provider? No in-person appointments available   for pt to return to office in approximately 2 weeks (around 12/13) for   followup on blood clot medication. Please call patient to schedule an   in-person with Dr. Golden Pierce only. Offered her CNP for 12/15 and she only wants   Dr. Golden Pierce.  ---------------------------------------------------------------------------  --------------  Bridgette Grass INFO  What is the best way for the office to contact you? OK to leave message on   voicemail  Preferred Call Back Phone Number? [phone]  ---------------------------------------------------------------------------  --------------  SCRIPT ANSWERS  Relationship to Patient? Self  Appointment reason? Well Care/Follow Ups  Select a Well Care/Follow Ups appointment reason? Adult Existing Condition   Follow Up [Diabetes   CHF   COPD   Hypertension/Blood Pressure Check]  (Is the patient requesting to be seen urgently for their symptoms?)? No  Is this follow up request related to routine Diabetes Management? Yes  Are you having any new concerns about your existing condition? No  Have you been diagnosed with   tested for   or told that you are suspected of having COVID-19 (Coronavirus)? No  Have you had a fever or taken medication to treat a fever within the past   3 days? No  Have you had a cough   shortness of breath or flu-like symptoms within the past 3 days? No  Do you currently have flu-like symptoms including fever or chills   cough   shortness of breath   or difficulty breathing   or new loss of taste or smell? No  (Service Expert - click yes below to proceed with [employer] [facility]   Scheduling)?

## 2020-11-24 NOTE — TELEPHONE ENCOUNTER
Pt is on the schedule 12/15/2020 at 8:45.  Pt states she is having a tooth extracted on 12/08/2020 and wants to know if the blood clot will cause any issues

## 2020-11-30 ENCOUNTER — OFFICE VISIT (OUTPATIENT)
Dept: FAMILY MEDICINE CLINIC | Age: 80
End: 2020-11-30
Payer: MEDICARE

## 2020-11-30 VITALS
WEIGHT: 160 LBS | SYSTOLIC BLOOD PRESSURE: 120 MMHG | OXYGEN SATURATION: 98 % | DIASTOLIC BLOOD PRESSURE: 70 MMHG | HEART RATE: 76 BPM | BODY MASS INDEX: 29.26 KG/M2 | TEMPERATURE: 97.2 F

## 2020-11-30 PROCEDURE — 99213 OFFICE O/P EST LOW 20 MIN: CPT | Performed by: FAMILY MEDICINE

## 2020-11-30 PROCEDURE — G8510 SCR DEP NEG, NO PLAN REQD: HCPCS | Performed by: FAMILY MEDICINE

## 2020-11-30 ASSESSMENT — PATIENT HEALTH QUESTIONNAIRE - PHQ9
SUM OF ALL RESPONSES TO PHQ QUESTIONS 1-9: 0
1. LITTLE INTEREST OR PLEASURE IN DOING THINGS: 0
SUM OF ALL RESPONSES TO PHQ QUESTIONS 1-9: 0
SUM OF ALL RESPONSES TO PHQ9 QUESTIONS 1 & 2: 0
2. FEELING DOWN, DEPRESSED OR HOPELESS: 0
SUM OF ALL RESPONSES TO PHQ QUESTIONS 1-9: 0

## 2020-11-30 ASSESSMENT — ENCOUNTER SYMPTOMS
GASTROINTESTINAL NEGATIVE: 1
RESPIRATORY NEGATIVE: 1
ROS SKIN COMMENTS: PER HPI

## 2020-11-30 NOTE — PROGRESS NOTES
Subjective:      Patient ID: Cornelio Lopez is a 68 y.o. female. HPI  Patient started Eliquis 1 week ago for a DVT of her left leg. She now states she has a small area that has been weeping clear fluid. She states it might have started out as a blisterlike lesion although she denies any trauma, etc. she states that started 2 days ago and lasted for approximately 1 day. There is no weeping today. The area was approximately less than 1/2 cm. There is some mild redness without discomfort. Otherwise she has no other symptoms. Patient was concerned this may be due to the Eliquis    Review of Systems   Constitutional: Negative. Respiratory: Negative. Cardiovascular: Positive for leg swelling. Gastrointestinal: Negative. Genitourinary: Negative. Musculoskeletal: Negative. Skin:        Per HPI   Neurological: Negative. Objective:   Physical Exam  Constitutional:       General: She is not in acute distress. Musculoskeletal:        Legs:    Skin:     General: Skin is warm and dry. Neurological:      Mental Status: She is alert and oriented to person, place, and time. Psychiatric:         Behavior: Behavior normal.         Thought Content: Thought content normal.         Judgment: Judgment normal.         Assessment/Plan:    Simon Elizalde was seen today for other. Diagnoses and all orders for this visit:    Deep venous thrombosis (DVT) of left peroneal vein, unspecified chronicity ([address]  Patient was reassured the small area of serous drainage is not due to Eliquis therapy and also reassured this is nothing serious. Observation.   Return if issues persist.  Dry sterile dressing if desired    Virginie Gonzalez MD

## 2020-12-10 ENCOUNTER — TELEPHONE (OUTPATIENT)
Dept: FAMILY MEDICINE CLINIC | Age: 80
End: 2020-12-10

## 2020-12-10 NOTE — TELEPHONE ENCOUNTER
----- Message from Angel Andersen sent at 12/10/2020  1:59 PM EST -----  Subject: Message to Provider    QUESTIONS  Information for Provider? Patient called to confirm that she will be there   for her appt on 12/15/2020 at 8:45 am.  ---------------------------------------------------------------------------  --------------  [address]  What is the best way for the office to contact you? OK to leave message on   voicemail  Preferred Call Back Phone Number? [phone]  ---------------------------------------------------------------------------  --------------  SCRIPT ANSWERS  Relationship to Patient?  Self

## 2020-12-15 ENCOUNTER — OFFICE VISIT (OUTPATIENT)
Dept: FAMILY MEDICINE CLINIC | Age: 80
End: 2020-12-15
Payer: MEDICARE

## 2020-12-15 VITALS
SYSTOLIC BLOOD PRESSURE: 116 MMHG | OXYGEN SATURATION: 99 % | HEART RATE: 60 BPM | WEIGHT: 158.2 LBS | BODY MASS INDEX: 28.94 KG/M2 | DIASTOLIC BLOOD PRESSURE: 64 MMHG

## 2020-12-15 PROBLEM — I82.532 CHRONIC DEEP VEIN THROMBOSIS (DVT) OF POPLITEAL VEIN OF LEFT LOWER EXTREMITY (HCC): Status: ACTIVE | Noted: 2020-12-15

## 2020-12-15 PROCEDURE — 99213 OFFICE O/P EST LOW 20 MIN: CPT | Performed by: FAMILY MEDICINE

## 2020-12-15 ASSESSMENT — ENCOUNTER SYMPTOMS
GASTROINTESTINAL NEGATIVE: 1
RESPIRATORY NEGATIVE: 1

## 2020-12-15 NOTE — PROGRESS NOTES
Judgment: Judgment normal.         Assessment/Plan:    Fausto Lynn was seen today for discuss medications. Diagnoses and all orders for this visit:    Essential hypertension, benign  Stable/Controlled  Continue current treatment  Home BP checks  Return 3 months    Dermatitis  -     hydrocortisone (WESTCORT) 0.2 % cream; Apply topically 2 times daily. Chronic deep vein thrombosis (DVT) of popliteal vein of left lower extremity (HCC)  Suggested support hose  Return in 2 to 3 months.   Continue Lorena Gilliam Si, MD

## 2021-01-04 ENCOUNTER — OFFICE VISIT (OUTPATIENT)
Dept: FAMILY MEDICINE CLINIC | Age: 81
End: 2021-01-04
Payer: MEDICARE

## 2021-01-04 ENCOUNTER — TELEPHONE (OUTPATIENT)
Dept: FAMILY MEDICINE CLINIC | Age: 81
End: 2021-01-04

## 2021-01-04 VITALS
SYSTOLIC BLOOD PRESSURE: 120 MMHG | OXYGEN SATURATION: 98 % | HEIGHT: 62 IN | BODY MASS INDEX: 29.08 KG/M2 | WEIGHT: 158 LBS | TEMPERATURE: 97.5 F | HEART RATE: 75 BPM | DIASTOLIC BLOOD PRESSURE: 60 MMHG

## 2021-01-04 DIAGNOSIS — T18.9XXA SWALLOWED FOREIGN BODY, INITIAL ENCOUNTER: Primary | ICD-10-CM

## 2021-01-04 PROCEDURE — 99213 OFFICE O/P EST LOW 20 MIN: CPT | Performed by: FAMILY MEDICINE

## 2021-01-04 ASSESSMENT — PATIENT HEALTH QUESTIONNAIRE - PHQ9
SUM OF ALL RESPONSES TO PHQ QUESTIONS 1-9: 0
SUM OF ALL RESPONSES TO PHQ9 QUESTIONS 1 & 2: 0
SUM OF ALL RESPONSES TO PHQ QUESTIONS 1-9: 0
2. FEELING DOWN, DEPRESSED OR HOPELESS: 0
SUM OF ALL RESPONSES TO PHQ QUESTIONS 1-9: 0

## 2021-01-04 NOTE — TELEPHONE ENCOUNTER
----- Message from Janeth Flores sent at 1/2/2021  1:18 PM EST -----  Subject: Message to Provider    QUESTIONS  Information for Provider? Pt called because she was drinking coke in a   [place] cup and the cup had a brown/leafy type substance on the cup and is   concerned about what it may have done for her health. Pt is wanting lab   work. Pt saved the cup as well. Please call as soon as possible.   ---------------------------------------------------------------------------  --------------  CALL BACK INFO  What is the best way for the office to contact you? OK to leave message on   voicemail  Preferred Call Back Phone Number? [phone]  ---------------------------------------------------------------------------  --------------  SCRIPT ANSWERS  Relationship to Patient?  Self

## 2021-01-04 NOTE — PROGRESS NOTES
Isidra Lion is a 68 y.o. female. HPI:  Here because 2 days ago she opened a bottle of Coke and poured into a [place] glass and started drinking at  [facility] she looked down and saw on a thin object floating in her cocaine she stopped drinking the Coke and was concerned that she may have swallowed part of it  She brought in the material today and is about 67 and since thick and 5 x 10 mm long looking like a piece of Coke soaked cardboard perhaps she denies any dysphagia choking nausea vomiting constipation diarrhea bowel changes abdominal pain. She does extremely anxious that she should be tested or that we should have the material that she brought in tested to be sure she will be all right    Wt Readings from Last 3 Encounters:   01/04/21 158 lb (71.7 kg)   12/15/20 158 lb 3.2 oz (71.8 kg)   11/30/20 160 lb (72.6 kg)     Meds, vitamins and allergies reviewed with Patient    ROS:  Gen: No fever  HEENT: No cold symptoms, no sore throat. CV:  Denies chest pain or palpitations. Pulm:  Denies shortness of breath, cough. Abd:  Denies abdominal pain, nausea and vomiting. Skin: no rash    No Known Allergies    Prior to Visit Medications    Medication Sig Taking? Authorizing Provider   hydrocortisone (WESTCORT) 0.2 % cream Apply topically 2 times daily. Yes Justin De La Rosa MD   apixaban (ELIQUIS) 5 MG TABS tablet 10 mg twice daily for the first 7 day. After 7 days, transition to 5 mg twice daily Yes Justin De La Rosa MD   LESCOL XL 80 MG extended release tablet TAKE ONE TABLET BY MOUTH EVERY DAY Yes Justin De La Rsoa MD   hydrochlorothiazide (HYDRODIURIL) 12.5 MG tablet TAKE ONE Tablet BY MOUTH DAILY Yes Justin De La Rosa MD   omeprazole (PRILOSEC) 20 MG delayed release capsule TAKE ONE Capsule BY MOUTH EVERY MORNING Yes Justin De La Rosa MD   clotrimazole-betamethasone (LOTRISONE) 1-0.05 % cream Apply topically 2 times daily Apply topically 2 times daily.  Yes Justin De La Rosa MD

## 2021-01-29 DIAGNOSIS — K21.9 GASTROESOPHAGEAL REFLUX DISEASE: ICD-10-CM

## 2021-01-29 RX ORDER — OMEPRAZOLE 20 MG/1
CAPSULE, DELAYED RELEASE ORAL
Qty: 30 CAPSULE | Refills: 5 | Status: SHIPPED | OUTPATIENT
Start: 2021-01-29 | End: 2021-08-05

## 2021-01-29 NOTE — TELEPHONE ENCOUNTER
Medication:   Requested Prescriptions     Pending Prescriptions Disp Refills    omeprazole (PRILOSEC) 20 MG delayed release capsule [Pharmacy Med Name: omeprazole 20 mg capsule,delayed release] 30 capsule 11     Sig: TAKE ONE Capsule BY MOUTH EVERY MORNING        Last Filled: 1/27/20 #30, 11 RF     Patient Phone Number: [phone] (home)     Last appt: 1/4/21 swallowed foreign body  Next appt: Visit date not found

## 2021-02-09 ENCOUNTER — TELEPHONE (OUTPATIENT)
Dept: FAMILY MEDICINE CLINIC | Age: 81
End: 2021-02-09

## 2021-02-09 NOTE — TELEPHONE ENCOUNTER
----- Message from Rogers Aquino sent at 2/9/2021  1:09 PM EST -----  Subject: Medication Problem    QUESTIONS  Name of Medication? apixaban (ELIQUIS) 5 MG TABS tablet  Patient-reported dosage and instructions? 1 5mg tablet twice daily  What question or problem do you have with the medication? Vera Murrieta wants to   know if she needs to stop taking her medication prior to upcoming   bloodwork. Bloodwork if for kidney doctor. Preferred Pharmacy? [address] [place] [address] [phone]  Pharmacy phone number (if available)? [phone]  Additional Information for Provider?   ---------------------------------------------------------------------------  --------------  CALL BACK INFO  What is the best way for the office to contact you? OK to leave message on   voicemail  Preferred Call Back Phone Number? [phone]  ---------------------------------------------------------------------------  --------------  SCRIPT ANSWERS  Relationship to Patient?  Self

## 2021-03-01 RX ORDER — OLMESARTAN MEDOXOMIL 20 MG/1
TABLET, FILM COATED ORAL
Qty: 90 TABLET | Refills: 3 | Status: SHIPPED | OUTPATIENT
Start: 2021-03-01 | End: 2021-10-11

## 2021-03-05 ENCOUNTER — TELEPHONE (OUTPATIENT)
Dept: FAMILY MEDICINE CLINIC | Age: 81
End: 2021-03-05

## 2021-03-05 NOTE — TELEPHONE ENCOUNTER
----- Message from Marty Mcpherson sent at 3/5/2021  9:17 AM EST -----  Subject: Message to Provider    QUESTIONS  Information for Provider? Pt has a teeth cleaning on Monday and wants to   know if she should stop taking her Eloquist before she goes in. Please   call back. ---------------------------------------------------------------------------  --------------  Richard SILVA  What is the best way for the office to contact you? OK to leave message on   voicemail  Preferred Call Back Phone Number? [phone]  ---------------------------------------------------------------------------  --------------  SCRIPT ANSWERS  Relationship to Patient?  Self

## 2021-03-11 ENCOUNTER — VIRTUAL VISIT (OUTPATIENT)
Dept: FAMILY MEDICINE CLINIC | Age: 81
End: 2021-03-11
Payer: MEDICARE

## 2021-03-11 DIAGNOSIS — M79.89 LEG SWELLING: Primary | ICD-10-CM

## 2021-03-11 DIAGNOSIS — L30.9 DERMATITIS: ICD-10-CM

## 2021-03-11 DIAGNOSIS — I82.452 DEEP VENOUS THROMBOSIS (DVT) OF LEFT PERONEAL VEIN, UNSPECIFIED CHRONICITY (HCC): ICD-10-CM

## 2021-03-11 DIAGNOSIS — N28.9 RENAL INSUFFICIENCY: ICD-10-CM

## 2021-03-11 PROCEDURE — 99443 PR PHYS/QHP TELEPHONE EVALUATION 21-30 MIN: CPT | Performed by: FAMILY MEDICINE

## 2021-03-11 RX ORDER — FUROSEMIDE 20 MG/1
TABLET ORAL
Qty: 30 TABLET | Refills: 1 | Status: SHIPPED | OUTPATIENT
Start: 2021-03-11 | End: 2021-07-19

## 2021-03-11 NOTE — PROGRESS NOTES
Rosalia Carlson is a 80 y.o. female evaluated via telephone on 3/11/2021. Consent:  She and/or health care decision maker is aware that that she may receive a bill for this telephone service, depending on her insurance coverage, and has provided verbal consent to proceed: Yes      Documentation:  I communicated with the patient and/or health care decision maker about leg swelling. Details of this discussion including any medical advice provided: Patient states her left leg was doing better on the hydrochlorothiazide however in the last few days to week it seems to be swelling more. She states she measured it before she went to bed last night and it was 17 inches and when she woke up this morning it was 15-1/2 inches. Conversely her right leg was 15 inches when she went to bed and 14-1/2 inches when she woke up. She also states she gets some itchy blotches on her leg although did not take the [facility] that was prescribed as it cost $80. She remains on Eliquis for her DVT. She states there is some mild discomfort in her leg as well. She is not wearing support hose. Assessment/Plan:      Leg swelling  -     furosemide (LASIX) 20 MG tablet; TAKE ONE TABLET PRN BY MOUTH DAILY AS NEEDED  Hold hydrochlorothiazide while on Lasix. Try this for 1 week and give me a call back. We discussed the possibility of support hose. Renal insufficiency  -     Basic Metabolic Panel; Future  Given the above Lasix we discussed the possibility of creating hypokalemia and also worsening of the renal insufficiency. We will repeat a BMP in approximately 2 weeks. Deep venous thrombosis (DVT) of left peroneal vein, unspecified chronicity (HCC)  We also discussed that the above may all be related to her DVT and venous damage. Again discussed the possibility of support hose and also the possibility that treatment of this would be difficult.   Dermatitis  Discussed with the patient that I was not  to the [facility] and if the pharmacist has a cheaper alternative we can certainly change to that so she can use that for her dermatitis. I affirm this is a Patient Initiated Episode with a Patient who has not had a related appointment within my department in the past 7 days or scheduled within the next 24 hours. Patient identification was verified at the start of the visit: Yes    Total Time: minutes: 21-30 minutes    The visit was conducted pursuant to the emergency declaration under the [address] [place] [address] and the [employer] Act. Patient identification was verified, and a caregiver was present when appropriate. The patient was located in a state where the provider was credentialed to provide care.     Note: not billable if this call serves to triage the patient into an appointment for the relevant concern      Carmella Hall

## 2021-03-17 ENCOUNTER — TELEPHONE (OUTPATIENT)
Dept: FAMILY MEDICINE CLINIC | Age: 81
End: 2021-03-17

## 2021-03-17 NOTE — TELEPHONE ENCOUNTER
Pt informed of the message. Pt is worried about swelling and is it going to cause any complications with her heart? Pt wants to know if she should start taking the hydrochlorothiazide?   Please advise

## 2021-03-17 NOTE — TELEPHONE ENCOUNTER
Lasix will make her go the bathroom a lot because that is the purpose is to urinate out the extra fluid. If she is satisfied with the results she can stop it, as we discussed she does not need to take it every day.

## 2021-04-08 ENCOUNTER — OFFICE VISIT (OUTPATIENT)
Dept: FAMILY MEDICINE CLINIC | Age: 81
End: 2021-04-08
Payer: MEDICARE

## 2021-04-08 VITALS
DIASTOLIC BLOOD PRESSURE: 60 MMHG | SYSTOLIC BLOOD PRESSURE: 116 MMHG | HEART RATE: 71 BPM | WEIGHT: 157.8 LBS | OXYGEN SATURATION: 98 % | BODY MASS INDEX: 28.86 KG/M2

## 2021-04-08 DIAGNOSIS — I10 ESSENTIAL HYPERTENSION, BENIGN: ICD-10-CM

## 2021-04-08 DIAGNOSIS — M79.89 LEG SWELLING: Primary | ICD-10-CM

## 2021-04-08 DIAGNOSIS — R60.0 PEDAL EDEMA: ICD-10-CM

## 2021-04-08 DIAGNOSIS — E78.00 PURE HYPERCHOLESTEROLEMIA: ICD-10-CM

## 2021-04-08 DIAGNOSIS — I82.532 CHRONIC DEEP VEIN THROMBOSIS (DVT) OF POPLITEAL VEIN OF LEFT LOWER EXTREMITY (HCC): ICD-10-CM

## 2021-04-08 PROCEDURE — 99214 OFFICE O/P EST MOD 30 MIN: CPT | Performed by: FAMILY MEDICINE

## 2021-04-08 ASSESSMENT — ENCOUNTER SYMPTOMS
RESPIRATORY NEGATIVE: 1
GASTROINTESTINAL NEGATIVE: 1

## 2021-04-08 NOTE — PROGRESS NOTES
Royal Friedman (:  1940) is a 80 y.o. female,Established patient, here for evaluation of the following chief complaint(s):  Leg Swelling (Pt states left leg and foot. She is also having pain. ) and Edema (Pt states she is having left hand and right shoulder swelling )      ASSESSMENT/PLAN:  1. Leg swelling  2. Pedal edema  3. Chronic deep vein thrombosis (DVT) of popliteal vein of left lower extremity (HCC)  I hand wrote a prescription for compression stockings, 10 to 20 mm pressure with 3 refills. I discussed with her how she should use these and that she did not need to sleep in them at nighttime. 4. Essential hypertension, benign  Continue current treatment. Home blood pressure checks. Return 3-months  5. Pure hypercholesterolemia  See below note. When she gets back to me as to which statin is more favorably covered we will switch her from Lescol to another statin. Return in about 3 months (around 2021). SUBJECTIVE/OBJECTIVE:  HPI  Patient continues with lower extremity edema despite furosemide as well as hydrochlorothiazide for hypertension. She does not check her blood pressure. She states the edema is mild and feels like it used to go away when she slept at night however no longer does she feel like that. She does have a chronic DVT of the left lower extremity which we discussed is the most likely cause for her venous insufficiency and therefore some leakage of the fluid into the leg. She states her last call is close to $400 per month and wonders if there is another prescription possible. She states she has not been on any other statin i.e. one that might of caused myalgias that she may have needed Lescol as one of the statins with lower myalgia side effect. Given that information, we discussed that she could really try any of the statins which should be significantly cheaper than the Lescol.  Pravastatin, rosuvastatin, and atorvastatin were 3 names I gave her to check with her insurance/pharmacy to see if any 1 of those would be significantly cheaper. Review of Systems   Constitutional: Negative. Respiratory: Negative. Cardiovascular: Positive for leg swelling. Gastrointestinal: Negative. Genitourinary: Negative. Musculoskeletal: Positive for arthralgias ( knees). Neurological: Negative. Physical Exam  Constitutional:       General: She is not in acute distress. Neck:      Musculoskeletal: Neck supple. Thyroid: No thyromegaly. Vascular: No carotid bruit. Cardiovascular:      Rate and Rhythm: Normal rate and regular rhythm. Pulmonary:      Effort: Pulmonary effort is normal.      Breath sounds: Normal breath sounds. No wheezing or rales. Musculoskeletal:      Right lower leg: No edema. Left lower leg: Edema ( mild) present. Lymphadenopathy:      Cervical: No cervical adenopathy. Skin:     General: Skin is warm and dry. Neurological:      Mental Status: She is alert and oriented to person, place, and time. Psychiatric:         Behavior: Behavior normal.         Thought Content: Thought content normal.         Judgment: Judgment normal.                 An electronic signature was used to authenticate this note.     --Katiuska Young MD

## 2021-04-15 DIAGNOSIS — F41.9 ANXIETY: Primary | ICD-10-CM

## 2021-04-15 RX ORDER — DIAZEPAM 5 MG/1
TABLET ORAL
Qty: 5 TABLET | Refills: 0 | Status: SHIPPED | OUTPATIENT
Start: 2021-04-15 | End: 2021-04-20

## 2021-04-19 DIAGNOSIS — E78.00 PURE HYPERCHOLESTEROLEMIA: Primary | ICD-10-CM

## 2021-04-20 RX ORDER — ATORVASTATIN CALCIUM 10 MG/1
10 TABLET, FILM COATED ORAL DAILY
Qty: 90 TABLET | Refills: 1 | Status: SHIPPED | OUTPATIENT
Start: 2021-04-20 | End: 2021-04-29

## 2021-04-29 DIAGNOSIS — E78.5 HYPERLIPIDEMIA, UNSPECIFIED HYPERLIPIDEMIA TYPE: ICD-10-CM

## 2021-04-29 RX ORDER — FLUVASTATIN SODIUM 80 MG/1
TABLET, EXTENDED RELEASE ORAL
Qty: 90 TABLET | Refills: 3 | Status: SHIPPED | OUTPATIENT
Start: 2021-04-29 | End: 2022-05-05

## 2021-04-29 NOTE — TELEPHONE ENCOUNTER
----- Message from Gaby monahan sent at 4/29/2021 11:04 AM EDT -----  Subject: Message to Provider    QUESTIONS  Information for Provider? Pt called in because pt doesn't want to take   Lipitor she wants to take Lescol. Her brother takes Lipitor and he has had   two heart attacks.   ---------------------------------------------------------------------------  --------------  CALL BACK INFO  What is the best way for the office to contact you? OK to leave message on   voicemail  Preferred Call Back Phone Number? [phone]  ---------------------------------------------------------------------------  --------------  SCRIPT ANSWERS  Relationship to Patient?  Self

## 2021-04-29 NOTE — TELEPHONE ENCOUNTER
I am okay to switch to Lescol however oftentimes that is not covered on insurance. Lipitor has nothing to do with her brother having heart attacks in fact he was probably on Lipitor because he did have heart attacks.   If patient still wants to switch to Lescol li it up and send back

## 2021-04-30 ENCOUNTER — TELEPHONE (OUTPATIENT)
Dept: FAMILY MEDICINE CLINIC | Age: 81
End: 2021-04-30

## 2021-04-30 NOTE — TELEPHONE ENCOUNTER
Office has been notified that pt is requiring Prior Authorization for the following medication:  LESCOL XL 80 MG extended release tablet         Please initiate this request through CoverMyMeds, contacting the following Payor/Insurance:  [employer]    Please see below, or the documentation attached to this encounter for any additional information that may assist in processing PA:  Hyperlipidemia, unspecified hyperlipidemia type (E78.5)     Thank you!

## 2021-05-10 ENCOUNTER — TELEPHONE (OUTPATIENT)
Dept: FAMILY MEDICINE CLINIC | Age: 81
End: 2021-05-10

## 2021-05-10 NOTE — TELEPHONE ENCOUNTER
Patient prefers [facility]. Her previous cardiologist has retired. She is calling his office to see who replaced him. She will call office back with name of provider if referral is needed.

## 2021-05-13 DIAGNOSIS — L30.9 DERMATITIS: ICD-10-CM

## 2021-05-13 NOTE — TELEPHONE ENCOUNTER
Called pt at [phone] left vm message, auth is pending may want to call insurance company to help speed things up left case number [id number], any questions [phone]   LOV 4/8/21  No upcoming appts scheduled  Medication pended

## 2021-06-10 ENCOUNTER — OFFICE VISIT (OUTPATIENT)
Dept: FAMILY MEDICINE CLINIC | Age: 81
End: 2021-06-10
Payer: MEDICARE

## 2021-06-10 VITALS
WEIGHT: 154.6 LBS | DIASTOLIC BLOOD PRESSURE: 64 MMHG | OXYGEN SATURATION: 99 % | SYSTOLIC BLOOD PRESSURE: 110 MMHG | BODY MASS INDEX: 28.28 KG/M2 | HEART RATE: 69 BPM

## 2021-06-10 DIAGNOSIS — I10 ESSENTIAL HYPERTENSION, BENIGN: ICD-10-CM

## 2021-06-10 DIAGNOSIS — M17.12 PRIMARY OSTEOARTHRITIS OF LEFT KNEE: Primary | ICD-10-CM

## 2021-06-10 DIAGNOSIS — Z01.818 PREOP EXAMINATION: ICD-10-CM

## 2021-06-10 DIAGNOSIS — E78.00 PURE HYPERCHOLESTEROLEMIA: ICD-10-CM

## 2021-06-10 DIAGNOSIS — R60.0 PEDAL EDEMA: ICD-10-CM

## 2021-06-10 DIAGNOSIS — I82.532 CHRONIC DEEP VEIN THROMBOSIS (DVT) OF POPLITEAL VEIN OF LEFT LOWER EXTREMITY (HCC): ICD-10-CM

## 2021-06-10 DIAGNOSIS — N28.9 RENAL INSUFFICIENCY: ICD-10-CM

## 2021-06-10 DIAGNOSIS — Z79.01 CURRENT USE OF ANTICOAGULANT THERAPY: ICD-10-CM

## 2021-06-10 PROCEDURE — 99214 OFFICE O/P EST MOD 30 MIN: CPT | Performed by: FAMILY MEDICINE

## 2021-06-10 SDOH — ECONOMIC STABILITY: TRANSPORTATION INSECURITY
IN THE PAST 12 MONTHS, HAS LACK OF TRANSPORTATION KEPT YOU FROM MEETINGS, WORK, OR FROM GETTING THINGS NEEDED FOR DAILY LIVING?: NO

## 2021-06-10 SDOH — ECONOMIC STABILITY: FOOD INSECURITY: WITHIN THE PAST 12 MONTHS, THE FOOD YOU BOUGHT JUST DIDN'T LAST AND YOU DIDN'T HAVE MONEY TO GET MORE.: NEVER TRUE

## 2021-06-10 SDOH — ECONOMIC STABILITY: TRANSPORTATION INSECURITY
IN THE PAST 12 MONTHS, HAS THE LACK OF TRANSPORTATION KEPT YOU FROM MEDICAL APPOINTMENTS OR FROM GETTING MEDICATIONS?: NO

## 2021-06-10 SDOH — ECONOMIC STABILITY: FOOD INSECURITY: WITHIN THE PAST 12 MONTHS, YOU WORRIED THAT YOUR FOOD WOULD RUN OUT BEFORE YOU GOT MONEY TO BUY MORE.: NEVER TRUE

## 2021-06-10 ASSESSMENT — SOCIAL DETERMINANTS OF HEALTH (SDOH): HOW HARD IS IT FOR YOU TO PAY FOR THE VERY BASICS LIKE FOOD, HOUSING, MEDICAL CARE, AND HEATING?: NOT HARD AT ALL

## 2021-06-10 NOTE — PROGRESS NOTES
Chief Complaint:     Gisel Jett is a 80 y.o. female who presents for a preoperative physical examination. She is scheduled to have left knee TKR done by Dr. Albert Monge at [facility] on 7/7/2021. History of Present Illness:      DJD left knee with discomfort and reduced function. Past Medical History:   Diagnosis Date    Arthritis     Cancer ([facility] [place] [address]     CKD (chronic kidney disease), stage III ([facility] [address] 4/9/2019    Colon polyps 4/09    Diverticulosis 2/15    CT finding    Diverticulosis of sigmoid     CT 4/11    Elevated cholesterol with high triglycerides     Essential hypertension, benign     Family history of colon cancer     Fibrocystic disease of breast     Hyperlipidemia     Rosacea     Spinal stenosis lumbar region         Review of patient's past surgical history indicates:     Past Surgical History:   Procedure Laterality Date    BLEPHAROPLASTY Bilateral 1/11; 3/12    Nerad    CHOLECYSTECTOMY, LAPAROSCOPIC  1/7/16    Dr Avelino Moreno: BN    COLONOSCOPY  4/09,6/2011; 6/18/14    polyps, repeat 3 years; [facility] MOHS SURGERY Left 09/2017    Nodular Basal Cell L temple:                                                   Current Outpatient Medications   Medication Sig Dispense Refill    hydrocortisone (WESTCORT) 0.2 % cream apply topically TO affected AREAS TWICE DAILY 30 g 1    LESCOL XL 80 MG extended release tablet TAKE ONE TABLET BY MOUTH EVERY DAY 90 tablet 3    furosemide (LASIX) 20 MG tablet TAKE ONE TABLET PRN BY MOUTH DAILY AS NEEDED 30 tablet 1    BENICAR 20 MG tablet TAKE ONE Tablet BY MOUTH DAILY 90 tablet 3    omeprazole (PRILOSEC) 20 MG delayed release capsule TAKE ONE Capsule BY MOUTH EVERY MORNING 30 capsule 5    hydroCHLOROthiazide (HYDRODIURIL) 12.5 MG tablet TAKE ONE Tablet BY MOUTH DAILY 90 tablet 3    apixaban (ELIQUIS) 5 MG TABS tablet 10 mg twice daily for the first 7 day.  After 7 days, transition to 5 mg twice daily 74 tablet 5    clotrimazole-betamethasone (LOTRISONE) 1-0.05 % cream Apply topically 2 times daily Apply topically 2 times daily. 60 g 3    carboxymethylcellulose 1 % ophthalmic solution 1 drop 3 times daily      aspirin EC 81 MG EC tablet Take 81 mg by mouth.  Omega-3 Fatty Acids (OMEGA 3 PO) Take by mouth 4 times daily        No current facility-administered medications for this visit. No Known Allergies    Social History     Tobacco Use    Smoking status: Never Smoker    Smokeless tobacco: Never Used   Substance Use Topics    Alcohol use: No    Drug use: No        Family History   Problem Relation Age of Onset    Heart Disease Mother     Heart Attack Mother     Heart Disease Father     Heart Attack Father     Heart Disease Brother     Cancer Brother 54        colon    Cancer Other 36        son: colon    Heart Attack Other 58        4 total last age 76        Review Of Systems    Skin: negative   Eyes: negative  Ears/Nose/Throat: negative  Respiratory: negative  Cardiovascular: negative, pedal edema  Gastrointestinal: negative  Genitourinary: negative  Musculoskeletal: per HPI  Neurologic: negative  Psychiatric: negative  Hematologic/Lymphatic/Immunologic: negative  Endocrine: negative    PHYSICAL EXAMINATION:  /64   Pulse 69   Wt 154 lb 9.6 oz (70.1 kg)   LMP 08/18/1993 (Exact Date)   SpO2 99%   BMI 28.28 kg/m²   General appearance - alert, no distress  Skin - Skin color, texture, turgor normal. No rashes or lesions. Head - Normocephalic. No abnormalities  Eyes -  conjunctivae/corneas clear. PERRL, EOM's intact. Ears - External ears normal. Canals clear. TM's normal.  Nose/Sinuses -  No drainage or sinus tenderness. Oropharynx - clear  Neck - Neck supple. No adenopathy or thyroid enlargement  Lungs - Lungs clear anterior and posterior  Heart - Regular rate and rhythm, with no rub, murmur or gallop noted. Abdomen - Abdomen soft, non-tender.  BS normal. No masses, organomegaly  Extremities - mild edema    ASSESSMENT:    Encounter Diagnoses   Name Primary?  Primary osteoarthritis of left knee Yes    Essential hypertension, benign     Chronic deep vein thrombosis (DVT) of popliteal vein of left lower extremity (HCC)     Pure hypercholesterolemia     Renal insufficiency     Pedal edema     Current use of anticoagulant therapy     Preop examination       She is medically cleared for surgery and anesthesia. Plan:    Per operating surgeon. She states she had an EKG done at [facility] within the last few weeks. Per request of surgeon BMP, CBC, PT/INR, APTT have been ordered.

## 2021-06-11 ENCOUNTER — TELEPHONE (OUTPATIENT)
Dept: FAMILY MEDICINE CLINIC | Age: 81
End: 2021-06-11

## 2021-06-15 ENCOUNTER — OFFICE VISIT (OUTPATIENT)
Dept: ENT CLINIC | Age: 81
End: 2021-06-15
Payer: MEDICARE

## 2021-06-15 VITALS
TEMPERATURE: 97.3 F | DIASTOLIC BLOOD PRESSURE: 57 MMHG | SYSTOLIC BLOOD PRESSURE: 125 MMHG | HEART RATE: 69 BPM | OXYGEN SATURATION: 100 %

## 2021-06-15 DIAGNOSIS — J02.9 VIRAL PHARYNGITIS: Primary | ICD-10-CM

## 2021-06-15 PROCEDURE — 99213 OFFICE O/P EST LOW 20 MIN: CPT | Performed by: OTOLARYNGOLOGY

## 2021-06-15 RX ORDER — METHYLPREDNISOLONE 4 MG/1
4 TABLET ORAL SEE ADMIN INSTRUCTIONS
Qty: 1 KIT | Refills: 0 | Status: SHIPPED | OUTPATIENT
Start: 2021-06-15 | End: 2021-10-05

## 2021-06-15 RX ORDER — HYDROCODONE BITARTRATE AND ACETAMINOPHEN 5; 325 MG/1; MG/1
1 TABLET ORAL EVERY 6 HOURS PRN
Qty: 20 TABLET | Refills: 0 | Status: SHIPPED | OUTPATIENT
Start: 2021-06-15 | End: 2021-06-22

## 2021-06-15 RX ORDER — VALACYCLOVIR HYDROCHLORIDE 1 G/1
1000 TABLET, FILM COATED ORAL 2 TIMES DAILY
Qty: 14 TABLET | Refills: 0 | Status: SHIPPED | OUTPATIENT
Start: 2021-06-15 | End: 2021-10-05

## 2021-06-15 NOTE — PROGRESS NOTES
Patient noticed the rather sudden onset of soreness in the left side of her throat radiating to her left ear on Saturday. This was unassociated with fever and she has had no decrease in hearing. Pain seems to occur when she swallows but she has had no hoarseness and no difficulty actually getting food or liquids down. She has had no stridor. She does not smoke. She has had no prior history of similar problems. She does have a history of kidney disease but is not on dialysis. Currently, she appears in no obvious acute distress. She is swallowing saliva appropriately and has had no voice change and no stridor. Currently ear examination reveals normal-appearing tympanic membranes and ear canals bilaterally with no evidence of infection or obstruction on either side. Nasal mucosa is mildly edematous consistent with allergy but there is no purulence present and no obstruction noted. Oral examination reveals some mild erythema on the left side. 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 No mass lesions are noted and the cords are mobile and do meet in the midline. No epiglottic enlargement is noted. The neck is free of any adenopathy, mass, thyroid enlargement. Findings are those of a viral nature and we will start her on Valtrex along with a Medrol Dosepak. She will take which she has had before for pain and she will hydrate better. I have asked that she contact me in 1 week to determine her response to therapy.

## 2021-06-16 ENCOUNTER — TELEPHONE (OUTPATIENT)
Dept: ENT CLINIC | Age: 81
End: 2021-06-16

## 2021-06-16 LAB
APTT: 30.9 SECONDS (ref 23.6–34)
BASOPHILS ABSOLUTE: 0.1 THOU/MCL (ref 0–0.2)
BASOPHILS ABSOLUTE: 1 %
EOSINOPHILS ABSOLUTE: 0.1 THOU/MCL (ref 0.03–0.45)
EOSINOPHILS RELATIVE PERCENT: 2 %
HCT VFR BLD CALC: 30.9 % (ref 36–46)
HEMOGLOBIN: 10.6 G/DL (ref 12–15.2)
INR BLD: 1.3 (ref 0.8–1.2)
LYMPHOCYTES ABSOLUTE: 2 THOU/MCL (ref 1–4)
LYMPHOCYTES RELATIVE PERCENT: 26 %
MCH RBC QN AUTO: 29.5 PG (ref 27–33)
MCHC RBC AUTO-ENTMCNC: 34.3 G/DL (ref 32–36)
MCV RBC AUTO: 86.1 FL (ref 82–97)
MONOCYTES # BLD: 7 %
MONOCYTES ABSOLUTE: 0.5 THOU/MCL (ref 0.2–0.9)
NEUTROPHILS ABSOLUTE: 4.7 THOU/MCL (ref 1.8–7.7)
PDW BLD-RTO: 13.3 % (ref 12.3–17)
PLATELET # BLD: 251 THOU/MCL (ref 140–375)
PMV BLD AUTO: 8.1 FL (ref 7.4–11.5)
PROTHROMBIN TIME: 16.4 SECONDS (ref 11.7–14.2)
RBC # BLD: 3.59 MIL/MCL (ref 3.8–5.2)
SEG NEUTROPHILS: 64 %
WBC # BLD: 7.4 THOU/MCL (ref 3.6–10.5)

## 2021-06-17 NOTE — TELEPHONE ENCOUNTER
Called pt and let her know that Dr. Bubba Boxer confirmed that the medication is correct for what he wants her to try it for, she voiced understanding.

## 2021-06-18 ENCOUNTER — TELEPHONE (OUTPATIENT)
Dept: ENT CLINIC | Age: 81
End: 2021-06-18

## 2021-06-18 DIAGNOSIS — J02.9 VIRAL PHARYNGITIS: Primary | ICD-10-CM

## 2021-06-18 RX ORDER — METHYLPREDNISOLONE 4 MG/1
4 TABLET ORAL SEE ADMIN INSTRUCTIONS
Qty: 1 KIT | Refills: 0 | Status: SHIPPED | OUTPATIENT
Start: 2021-06-18 | End: 2021-10-05

## 2021-06-18 NOTE — TELEPHONE ENCOUNTER
Pt.states Dr. Jarad Romero told her to call with a follow up call pt.states the pain is gone the ears still feel stuffy but not so bad as before the throat doesn't have pain but it still feels strange like something is still there as she swallow. She says she has apt. On 06/25 for a follow up.

## 2021-06-18 NOTE — TELEPHONE ENCOUNTER
Pt has been notified of the medrol dosepak refill at the pharmacy and to keep her appointment, she voiced understanding.

## 2021-06-24 DIAGNOSIS — I82.452 DEEP VENOUS THROMBOSIS (DVT) OF LEFT PERONEAL VEIN, UNSPECIFIED CHRONICITY (HCC): ICD-10-CM

## 2021-06-24 NOTE — TELEPHONE ENCOUNTER
Medication:   Requested Prescriptions     Pending Prescriptions Disp Refills    apixaban (ELIQUIS) 5 MG TABS tablet [Pharmacy Med Name: Eliquis 5 mg tablet] 74 tablet 5     Sig: take TWO tablets by MOUTH twice daily FOR THE first SEVEN DAYS. THEN take ONE tablet by MOUTH twice daily.         Last Filled:  11/24/20 #74, 5 RF     Patient Phone Number: [phone] (home)     Last appt: 6/10/2021 pre op exam   Next appt: Visit date not found

## 2021-06-25 ENCOUNTER — OFFICE VISIT (OUTPATIENT)
Dept: ENT CLINIC | Age: 81
End: 2021-06-25
Payer: MEDICARE

## 2021-06-25 VITALS — HEART RATE: 80 BPM | TEMPERATURE: 96 F | DIASTOLIC BLOOD PRESSURE: 75 MMHG | SYSTOLIC BLOOD PRESSURE: 134 MMHG

## 2021-06-25 DIAGNOSIS — J02.9 VIRAL PHARYNGITIS: Primary | ICD-10-CM

## 2021-06-25 PROCEDURE — 99213 OFFICE O/P EST LOW 20 MIN: CPT | Performed by: OTOLARYNGOLOGY

## 2021-08-05 DIAGNOSIS — K21.9 GASTROESOPHAGEAL REFLUX DISEASE: ICD-10-CM

## 2021-08-05 RX ORDER — OMEPRAZOLE 20 MG/1
CAPSULE, DELAYED RELEASE ORAL
Qty: 30 CAPSULE | Refills: 5 | Status: SHIPPED | OUTPATIENT
Start: 2021-08-05 | End: 2022-02-10

## 2021-08-16 ENCOUNTER — TELEPHONE (OUTPATIENT)
Dept: FAMILY MEDICINE CLINIC | Age: 81
End: 2021-08-16

## 2021-08-16 NOTE — TELEPHONE ENCOUNTER
[facility] Request Authorization To Change Medication to Generic Substitution    Request to Change    BENICAR 20 MG tablet         And Change to Generic    Olmesartan Medoxomil Tabs 20MG

## 2021-08-16 NOTE — TELEPHONE ENCOUNTER
Spoke with patient and she states she does not want the generic, she would rather have the name brand.

## 2021-08-16 NOTE — TELEPHONE ENCOUNTER
That is up to patient whether she prefers to switch or not.   However the generic is not what is listed, the generic is olmesartan

## 2021-09-07 ENCOUNTER — OFFICE VISIT (OUTPATIENT)
Dept: FAMILY MEDICINE CLINIC | Age: 81
End: 2021-09-07
Payer: MEDICARE

## 2021-09-07 VITALS
SYSTOLIC BLOOD PRESSURE: 122 MMHG | WEIGHT: 145.5 LBS | OXYGEN SATURATION: 96 % | HEIGHT: 62 IN | DIASTOLIC BLOOD PRESSURE: 60 MMHG | HEART RATE: 89 BPM | BODY MASS INDEX: 26.78 KG/M2

## 2021-09-07 DIAGNOSIS — Z12.11 COLON CANCER SCREENING: ICD-10-CM

## 2021-09-07 DIAGNOSIS — K64.4 HEMORRHOIDAL SKIN TAG: Primary | ICD-10-CM

## 2021-09-07 PROCEDURE — 99213 OFFICE O/P EST LOW 20 MIN: CPT | Performed by: FAMILY MEDICINE

## 2021-09-07 ASSESSMENT — ENCOUNTER SYMPTOMS
ABDOMINAL PAIN: 0
RECTAL PAIN: 0
ABDOMINAL DISTENTION: 0
ANAL BLEEDING: 0
DIARRHEA: 0
CONSTIPATION: 0
BLOOD IN STOOL: 0
RESPIRATORY NEGATIVE: 1
GASTROINTESTINAL NEGATIVE: 1

## 2021-09-07 NOTE — PROGRESS NOTES
Shelley Gómez (:  1940) is a 80 y.o. female,Established patient, here for evaluation of the following chief complaint(s):  Hemorrhoids         ASSESSMENT/PLAN:  1. Hemorrhoidal skin tag  2. Colon cancer screening  -     COLONOSCOPY (Screening); Future  -     [facility] - Dany Mustafa MD, Gastroenterology, [facility]  Patient is overdue for colonoscopy. She was supposed to be on a 3-year schedule for polyps. Return if symptoms worsen or fail to improve. Subjective   SUBJECTIVE/OBJECTIVE:  HPI  Last week noted a small bump near anus. She states there was no pain with this nor was there any bleeding. She has not had any previous history of hemorrhoids. She has no other GI symptoms. Review of Systems   Constitutional: Positive for activity change ( She had knee surgery done on  and is still doing PT and walking with a cane however she states she is doing well with this) and appetite change (.  She states she is not really as hungry as she used to be). Negative for chills, diaphoresis, fatigue and fever. HENT: Negative. Respiratory: Negative. Cardiovascular: Negative. Gastrointestinal: Negative. Negative for abdominal distention, abdominal pain, anal bleeding, blood in stool, constipation, diarrhea and rectal pain. Per HPI   Genitourinary: Negative. Musculoskeletal: Positive for arthralgias ( Recent knee surgery). Neurological: Negative. Objective   Physical Exam  Constitutional:       General: She is not in acute distress. Appearance: She is not ill-appearing, toxic-appearing or diaphoretic. HENT:      Head: Normocephalic and atraumatic. Eyes:      Conjunctiva/sclera: Conjunctivae normal.   Genitourinary:     Rectum: No mass, tenderness, anal fissure, external hemorrhoid or internal hemorrhoid. Normal anal tone. Comments: Patient had a few anal skin tags with no definite hemorrhoid  Skin:     General: Skin is warm and dry. Neurological:      Mental Status: She is alert and oriented to person, place, and time. Psychiatric:         Behavior: Behavior normal.         Thought Content: Thought content normal.         Judgment: Judgment normal.                  An electronic signature was used to authenticate this note.     --Dilip Hogue MD

## 2021-09-13 ENCOUNTER — TELEPHONE (OUTPATIENT)
Dept: FAMILY MEDICINE CLINIC | Age: 81
End: 2021-09-13

## 2021-09-13 DIAGNOSIS — K64.4 HEMORRHOIDAL SKIN TAGS: Primary | ICD-10-CM

## 2021-09-13 RX ORDER — HYDROCORTISONE 25 MG/G
CREAM TOPICAL
Qty: 30 G | Refills: 1 | Status: SHIPPED | OUTPATIENT
Start: 2021-09-13 | End: 2021-10-05

## 2021-09-13 NOTE — TELEPHONE ENCOUNTER
----- Message from Nidhi Murillo sent at 9/11/2021 10:53 AM EDT -----  Subject: Message to Provider    QUESTIONS  Information for Provider? Patient was seen on Tuesday 9/7/2021 and she   says her hemorrhoids are very itchy and she is wondering if there is   anything she can put on it to help. Please call and advise. She says you   can try to leave a message but it may not let you.   ---------------------------------------------------------------------------  --------------  CALL BACK INFO  What is the best way for the office to contact you? OK to leave message on   voicemail  Preferred Call Back Phone Number? [phone]  ---------------------------------------------------------------------------  --------------  SCRIPT ANSWERS  Relationship to Patient?  Self

## 2021-09-13 NOTE — TELEPHONE ENCOUNTER
Anusol HC cream was sent to her [address] [place] [address] Jenise Elam M.D.  [phone]            2020          Colonoscopy Operative Report  Shila Sosa  :  1937  [facility] Medical Record Number:  [id number]      Indications:    Lower rectal bleeding     :  Hiren Roberts MD    Referring Provider: Miriam Langston MD    Sedation:  MAC anesthesia    Pre-Procedural Exam:      Airway: clear,  No airway problems anticipated  Heart: RRR, without gallops or rubs  Lungs: clear bilaterally without wheezes, crackles, or rhonchi  Abdomen: soft, nontender, nondistended, bowel sounds present  Mental Status: awake, alert and oriented to person, place and time     Procedure Details:  After informed consent was obtained with all risks and benefits of procedure explained and preoperative exam completed, the patient was taken to the endoscopy suite and placed in the left lateral decubitus position. Upon sequential sedation as per above, a digital rectal exam was performed. The Olympus videocolonoscope  was inserted in the rectum and carefully advanced to the cecum, which was identified by the ileocecal valve and appendiceal orifice. The quality of preparation was excellent. The colonoscope was slowly withdrawn with careful inspection and evaluation between folds. Retroflexion in the rectum was performed. Findings:   Terminal Ileum: normal  Cecum: normal  Ascending Colon: normal  Transverse Colon: normal  Descending Colon: normal  Sigmoid: no mucosal lesion appreciated  mild diverticulosis; Rectum: Evidence of several small non bleeding angioectasias noted in the distal rectum against the anterior wall most consistent with radiation proctitis, otherwise mucosa within normal. Small internal hemorrhoids noted. Interventions:  APC applied and complete ablation of the angiectasias was performed. Specimen Removed:  none    Complications: None. EBL:  None.     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 Mild sigmoid diverticulosis and small internal hemorrhoids. Recommendations:  -High fiber diet.    -Resume regular medication(s) today and Eliquis in 2 days  -Repeat flexible sigmoidoscopy if bleeding is recurrent, otherwise no need for repeat colonoscopy for screening purposes based on age. Discharge Disposition:  Home in the company of a  when able to ambulate.     Chanda Wallace MD  5/8/2020  10:33 AM

## 2021-09-13 NOTE — TELEPHONE ENCOUNTER
----- Message from Ranjan Hollins sent at 9/11/2021 10:53 AM EDT -----  Subject: Message to Provider    QUESTIONS  Information for Provider? Patient was seen on Tuesday 9/7/2021 and she   says her hemorrhoids are very itchy and she is wondering if there is   anything she can put on it to help. Please call and advise. She says you   can try to leave a message but it may not let you.   ---------------------------------------------------------------------------  --------------  CALL BACK INFO  What is the best way for the office to contact you? OK to leave message on   voicemail  Preferred Call Back Phone Number? [phone]  ---------------------------------------------------------------------------  --------------  SCRIPT ANSWERS  Relationship to Patient?  Self

## 2021-09-21 ENCOUNTER — TELEPHONE (OUTPATIENT)
Dept: FAMILY MEDICINE CLINIC | Age: 81
End: 2021-09-21

## 2021-09-21 NOTE — TELEPHONE ENCOUNTER
She will need to stop the Eliquis. She should check with GI to find out when they want her to stop this medication. They should also review her medication list to see if there is anything else that she should stop although I do not see anything on there.

## 2021-09-21 NOTE — TELEPHONE ENCOUNTER
----- Message from Jonh Veronica sent at 9/21/2021 10:40 AM EDT -----  Subject: Message to Provider    QUESTIONS  Information for Provider? Having colonoscopy on 9/27, doesn't know which   medications she should stop taking and when. Please call back to advise.   ---------------------------------------------------------------------------  --------------  CALL BACK INFO  What is the best way for the office to contact you? OK to leave message on   voicemail  Preferred Call Back Phone Number? [phone]  ---------------------------------------------------------------------------  --------------  SCRIPT ANSWERS  Relationship to Patient?  Self

## 2021-09-21 NOTE — TELEPHONE ENCOUNTER
Received forms from [address]  for Keiry Bean MD to sign.  Attached to encounter and placed in provider bin downstairs

## 2021-09-27 ENCOUNTER — TELEPHONE (OUTPATIENT)
Dept: FAMILY MEDICINE CLINIC | Age: 81
End: 2021-09-27

## 2021-09-27 NOTE — TELEPHONE ENCOUNTER
A letter from [employer] requesting a medication  Change has been scanned in epic. please have provider sign and fax back.

## 2021-10-05 ENCOUNTER — PREP FOR PROCEDURE (OUTPATIENT)
Dept: SURGERY | Age: 81
End: 2021-10-05

## 2021-10-05 ENCOUNTER — OFFICE VISIT (OUTPATIENT)
Dept: SURGERY | Age: 81
End: 2021-10-05
Payer: MEDICARE

## 2021-10-05 VITALS
DIASTOLIC BLOOD PRESSURE: 77 MMHG | WEIGHT: 141 LBS | BODY MASS INDEX: 25.95 KG/M2 | OXYGEN SATURATION: 98 % | SYSTOLIC BLOOD PRESSURE: 135 MMHG | HEART RATE: 103 BPM | HEIGHT: 62 IN

## 2021-10-05 DIAGNOSIS — D12.4 ADENOMATOUS POLYP OF DESCENDING COLON: Primary | ICD-10-CM

## 2021-10-05 DIAGNOSIS — N18.31 STAGE 3A CHRONIC KIDNEY DISEASE (HCC): ICD-10-CM

## 2021-10-05 DIAGNOSIS — I82.532 CHRONIC DEEP VEIN THROMBOSIS (DVT) OF POPLITEAL VEIN OF LEFT LOWER EXTREMITY (HCC): ICD-10-CM

## 2021-10-05 PROCEDURE — 99204 OFFICE O/P NEW MOD 45 MIN: CPT | Performed by: SURGERY

## 2021-10-05 RX ORDER — SODIUM CHLORIDE 9 MG/ML
25 INJECTION, SOLUTION INTRAVENOUS PRN
Status: CANCELLED | OUTPATIENT
Start: 2021-10-05

## 2021-10-05 RX ORDER — ACETAMINOPHEN 325 MG/1
1000 TABLET ORAL ONCE
Status: CANCELLED | OUTPATIENT
Start: 2021-10-05 | End: 2021-10-05

## 2021-10-05 RX ORDER — SODIUM CHLORIDE 0.9 % (FLUSH) 0.9 %
5-40 SYRINGE (ML) INJECTION EVERY 12 HOURS SCHEDULED
Status: CANCELLED | OUTPATIENT
Start: 2021-10-05

## 2021-10-05 RX ORDER — SODIUM CHLORIDE 0.9 % (FLUSH) 0.9 %
5-40 SYRINGE (ML) INJECTION PRN
Status: CANCELLED | OUTPATIENT
Start: 2021-10-05

## 2021-10-05 NOTE — LETTER
[facility] [address] [place] [address] [phone]  f [phone]    Lesa Felty, MD                        SURGERY ORDER   -- Time of order -- 10/5/21    4:41 PM    Facility:   [facility].  # _________________                                                                                    Scheduled By:____________                  Surgery Date & Time:                                       Pt arrival:                                                                                       Patient Name:  Jostin Verma     :  1940     PCP:  Jaja Erickson MD      Home Ph:    [phone] (home)                                                     PROCEDURE: Laparoscopic assisted colonoscopy with polypectomy (need good colonoscope with water irrigation, snares, clips available)    DIAGNOSIS:  Descending colon polyp    Anesthesia: _General    Anesthesia (lines, blocks, TAP/epidural, etc): none  Time Needed:  45 minutes    Pt Position:  lithotomy    Ureteral Stents: no  Ostomy Marking: no          Admit _x__                  Pre-Op clearance to be done by: _PCP____    Cardiac Clearance Done by: ________    Medications to be stopped 3 days before surgery: ___eliquis______                                                                                      [unfilled]                                                                                                                   Lesa Felty, MD                          COLON SURGERY CHECKLIST    -- Pre-op clearance: PCP  -- Anticoagulation/lovenox, ASA, plavix, etc  -- Surgery order faxed, date/time obtained, placed on calendar  -- Prep and oral antibiotics (#2) ordered, information given to patient  -- Phone call day before procedure to confirm  -- Post op appt: 2 weeks  -- Other: plan surgery in 4-5 wks

## 2021-10-05 NOTE — PROGRESS NOTES
[address] [address] [address]  Dept: [phone]  Dept Fax: [phone]  Loc: [phone]    Visit Date: 10/5/2021    Bessy Bledsoe is a 80 y.o. female who presents today for: New Patient (polyp)      HPI:       Bessy Bledsoe is a 80 y.o. female referred to me by Dr. Nehemiah Whitfield of GI for further evaluation regarding flat sessile polypoid lesion of the descending colon. Patient saw Dr. Nehemiah Whitfield on 21 for surveillance colonoscopy she had had a prior history of colon polyps and underwent a colonoscopy 7 years before that at [facility].      She has a significant family history of colon cancer in her son who  at age 46, and a brother with colon cancer in his early 46s. She has a past surgical history of cholecystectomy and a past medical history of chronic kidney disease stage III, hypertension. She is on Eliquis anticoagulation    She underwent colonoscopy on 21 which revealed a 20 mm sessile polyp/lateral spreading lesion at an area that had old ink tattoo in the distal transverse versus descending colon. Biopsies were deferred and the lesion was not removed. 3 other small polyps were also removed at that time. Pathology is pending from the polyps at this point. Patient's problem list, medications, past medical, surgical, family, and social histories were reviewed and updated in the chart as indicated today.     Past Medical History:   Diagnosis Date    Arthritis     Cancer ([facility] [address]     CKD (chronic kidney disease), stage III ([facility] [address] 2019    Colon polyps     Diverticulosis 2/15    CT finding    Diverticulosis of sigmoid     CT     Elevated cholesterol with high triglycerides     Essential hypertension, benign     Family history of colon cancer     Fibrocystic disease of breast     Hyperlipidemia     Rosacea     Spinal stenosis lumbar region        Past Surgical History: Procedure Laterality Date    BLEPHAROPLASTY Bilateral 1/11; 3/12    Nerad    CHOLECYSTECTOMY, LAPAROSCOPIC  1/7/16    Dr Vipul Spears: BN    COLONOSCOPY  4/09,6/2011; 6/18/14    polyps, repeat 3 years; [facility] MOHS SURGERY Left 09/2017    Nodular Basal Cell L temple:       Cancer-related family history includes Cancer (age of onset: 36) in an other family member; Cancer (age of onset: 54) in her brother. Social History:   Social History     Tobacco Use    Smoking status: Never Smoker    Smokeless tobacco: Never Used   Substance Use Topics    Alcohol use: No      Tobacco cessation counseling provided as appropriate. REVIEW OF SYSTEMS:    Pertinent positives and negatives are mentioned in the HPI above. Otherwise, all other systems were reviewed and negative. Objective:     Physical Exam   /77 Comment: recheck after 5 minutes  Pulse 103   Ht 5' 2\" (1.575 m)   Wt 141 lb (64 kg)   LMP 08/18/1993 (Exact Date)   SpO2 98%   BMI 25.79 kg/m²   Constitutional: Appears well-developed and well-nourished. Grooming appropriate. No gross deformities. Body mass index is 25.79 kg/m². Eyes: No scleral icterus. Conjunctiva/lids normal. Vision intact grossly. Pupils equal/symmetric, reactive bilaterally. ENT: External ears/nose without defect, scars, or masses. Hearing grossly intact. No facial deformity. Lips normal, normal dentition. Neck: No masses. Trachea midline. No crepitus. Thyroid not enlarged. Cardiovascular: Normal rate. No peripheral edema. Abdominal aorta normal size to palpation. Pulmonary/Chest: Effort normal. No respiratory distress. No wheezes. No use of accessory muscles. Musculoskeletal: Normal range of motion x all 4 extremities and head/neck, without deformity, pain, or crepitus, with normal strength and tone. Normal gait. Nails without clubbing or cyanosis. Neurological: Alert and oriented to person, place, and time. No gross deficits. Sensation intact. Skin: Skin is dry.  No rashes noted. No pallor. No induration of nodules. Psychiatric: Normal mood and affect. Behavior normal. Oriented to person, place, and time. Judgment and insight reasonable. Abdominal/wound: Soft, nontender, nondistended    Anorectal exam with chaperone in room. Patient placed left lower position. Buttock spread. Digital rectal exam normal.  Anoscopy normal    Labs reviewed: None  Radiology reviewed: None    Last colonoscopy: Colonoscopy report reviewed and discussed with performing physician, Dr. Olivia Cook. Coordination of care with GI      Assessment/Plan:     A/P:  New problem(s): Endoscopically unresectable polypoid lesion of the descending colon  Established problem(s): CKD, family history colon cancer  Additional workup/treatment planned: Laparoscopic assisted colonoscopy, possible colectomy  Risk of complications/morbidity: High    I had a very long discussion with Calista Diamond today in the office. She is very concerned given her family history of colon cancer that this lesion could be precancerous or cancerous. But we do not have biopsies from this area of concern, it appeared to be sessile and needs to be removed. I discussed 2 options. First option is laparoscopic assisted colonoscopy with polypectomy. Discussed that this may not necessarily be definitive, but could potentially be. Discussed the risks of this procedure, including need for general esthesia, risks of bleeding, perforation, need for open operation or colectomy. If the lesion is incompletely excised, or if there are any complications with the polyp removal, she will then need more formal colectomy. I would likely not do this at the same time of her polypectomy due to the colonic distention which would make laparoscopic surgery less feasible. The only exception would be if there was a perforation that could not be repaired laparoscopically. Discussed with her need for bowel prep.   Discussed that there is no urgency and typically these lesions are very slow-growing. She would like to schedule surgery in the next month or so. I offered the option of just skipping the laparoscopic assisted colonoscopy and proceeding with more definitive colectomy, but she would like to attempt a laparoscopic colonoscopy, which I think is very reasonable. She will need to hold her Eliquis and get PCP clearance. She understands she is at high risk complications given her chronic kidney disease stage III. Continue with current medications      DISPOSITION: Schedule surgery in the coming weeks    My findings will be relayed to consulting practitioner or PCP via Epic    Note completed using dictation software, please excuse any errors.     Electronically signed by Siena Cochran MD on 10/5/2021 at 4:35 PM

## 2021-10-06 ENCOUNTER — TELEPHONE (OUTPATIENT)
Dept: SURGERY | Age: 81
End: 2021-10-06

## 2021-10-06 NOTE — TELEPHONE ENCOUNTER
Patient called stating she would like to wait until the end of January for her surgery. She is having her right knee replaced in 11/30 and would like to wait until after she recovers.

## 2021-10-11 ENCOUNTER — TELEPHONE (OUTPATIENT)
Dept: FAMILY MEDICINE CLINIC | Age: 81
End: 2021-10-11

## 2021-10-11 DIAGNOSIS — I10 ESSENTIAL HYPERTENSION, BENIGN: Primary | ICD-10-CM

## 2021-10-11 RX ORDER — OLMESARTAN MEDOXOMIL 20 MG/1
20 TABLET ORAL DAILY
Qty: 90 TABLET | Refills: 3 | Status: SHIPPED | OUTPATIENT
Start: 2021-10-11 | End: 2022-03-07

## 2021-10-19 ENCOUNTER — TELEPHONE (OUTPATIENT)
Dept: RHEUMATOLOGY | Age: 81
End: 2021-10-19

## 2021-10-19 NOTE — TELEPHONE ENCOUNTER
PA COVER MY MEDS  Medication:Benicar 20MG tablets  Key:[id number] - PA Case ID: [id number]  Status:APPROVED        Authorization Status:APPROVAL  Authorization Number:CaseId:[id number]; Approved : :Benicar 20MG tablets    Date SpanCoverage Start Date:09/19/2021; Coverage End Date:10/19/2022

## 2021-11-03 ENCOUNTER — TELEPHONE (OUTPATIENT)
Dept: FAMILY MEDICINE CLINIC | Age: 81
End: 2021-11-03

## 2021-11-03 NOTE — TELEPHONE ENCOUNTER
She could try a Dulcolax, glycerin suppository, or fleets enema. Her constipation is due to the oxycodone.

## 2021-11-03 NOTE — TELEPHONE ENCOUNTER
The patient calls in states that she has not had a bowel movement since 10/31/21. The patient had been taking oxycodone for shoulder pain. She stopped taking the oxycodone on 11/2/21. As her shoulder pain resolved. The patient took Ex-Lax yesterday and has still not had a bowel movement. The patient has Asia-Lax but is afraid to take it due to her kidneys. The patient would like to be advised what she make take or do to resolve her constipation. Please Advise.

## 2021-11-04 ENCOUNTER — TELEPHONE (OUTPATIENT)
Dept: FAMILY MEDICINE CLINIC | Age: 81
End: 2021-11-04

## 2021-11-04 NOTE — TELEPHONE ENCOUNTER
----- Message from BeDo sent at 11/4/2021  3:31 PM EDT -----  Subject: Message to Provider    QUESTIONS  Information for Provider? Joanne Vitale went to [facility] for   constipation. They were able to get her to have a large bowel movement. She wants Dr Lloyd García to know they gave her a script for Magnesium citrate   solution, dose 296 mg by mouth once for 1 dose. You can have her come in   before Monday 11-8-2021 if you need her to.  ---------------------------------------------------------------------------  --------------  [address]  What is the best way for the office to contact you? OK to leave message on   voicemail  Preferred Call Back Phone Number? [phone]  ---------------------------------------------------------------------------  --------------  SCRIPT ANSWERS  Relationship to Patient?  Self

## 2021-11-08 ENCOUNTER — OFFICE VISIT (OUTPATIENT)
Dept: FAMILY MEDICINE CLINIC | Age: 81
End: 2021-11-08
Payer: MEDICARE

## 2021-11-08 VITALS
SYSTOLIC BLOOD PRESSURE: 120 MMHG | BODY MASS INDEX: 25.58 KG/M2 | HEIGHT: 62 IN | WEIGHT: 139 LBS | HEART RATE: 73 BPM | OXYGEN SATURATION: 98 % | DIASTOLIC BLOOD PRESSURE: 54 MMHG

## 2021-11-08 DIAGNOSIS — M17.12 PRIMARY OSTEOARTHRITIS OF LEFT KNEE: Primary | ICD-10-CM

## 2021-11-08 DIAGNOSIS — I82.532 CHRONIC DEEP VEIN THROMBOSIS (DVT) OF POPLITEAL VEIN OF LEFT LOWER EXTREMITY (HCC): ICD-10-CM

## 2021-11-08 DIAGNOSIS — I10 ESSENTIAL HYPERTENSION, BENIGN: ICD-10-CM

## 2021-11-08 DIAGNOSIS — E78.00 PURE HYPERCHOLESTEROLEMIA: ICD-10-CM

## 2021-11-08 DIAGNOSIS — N28.9 RENAL INSUFFICIENCY: ICD-10-CM

## 2021-11-08 DIAGNOSIS — Z01.818 PRE-OP EXAM: ICD-10-CM

## 2021-11-08 PROCEDURE — 93000 ELECTROCARDIOGRAM COMPLETE: CPT | Performed by: FAMILY MEDICINE

## 2021-11-08 PROCEDURE — 99214 OFFICE O/P EST MOD 30 MIN: CPT | Performed by: FAMILY MEDICINE

## 2021-11-08 NOTE — PROGRESS NOTES
Chief Complaint:     Sean Kearney is a 80 y.o. female who presents for a preoperative physical examination. She is scheduled to have R TKR done by Dr. Martha Downs at [facility] on 12/8/2021. History of Present Illness:      DJD R knee. L TKR done in July. Past Medical History:   Diagnosis Date    Arthritis     [facility] ([facility] [address] [facility]     CKD (chronic kidney disease), stage III ([facility] [address] 4/9/2019    Colon polyps 4/09    Diverticulosis 2/15    CT finding    Diverticulosis of sigmoid     CT 4/11    Elevated cholesterol with high triglycerides     Essential hypertension, benign     Family history of colon cancer     Fibrocystic disease of breast     Hyperlipidemia     Rosacea     Spinal stenosis lumbar region         Review of patient's past surgical history indicates:     Past Surgical History:   Procedure Laterality Date    BLEPHAROPLASTY Bilateral 1/11; 3/12    Nerad    CHOLECYSTECTOMY, LAPAROSCOPIC  01/07/2016    Dr Boykin Devoid: BN    COLONOSCOPY  4/09,6/2011; 6/18/14    polyps, repeat 3 years; Tucker Patricio COLONOSCOPY  09/27/2021    Dr. Everton Bhagat: Multiple polyps, extensive diverticulosis and 1 year surveillance    MOHS SURGERY Left 09/2017    Nodular Basal Cell L temple:                                                   Current Outpatient Medications   Medication Sig Dispense Refill    potassium chloride (KLOR-CON M) 20 MEQ extended release tablet Take 1 tablet by mouth 2 times daily 6 tablet 0    olmesartan (BENICAR) 20 MG tablet Take 1 tablet by mouth daily 90 tablet 3    omeprazole (PRILOSEC) 20 MG delayed release capsule TAKE ONE Capsule BY MOUTH EVERY MORNING 30 capsule 5    furosemide (LASIX) 20 MG tablet TAKE ONE Tablet BY MOUTH AS NEEDED DAILY 30 tablet 1    apixaban (ELIQUIS) 5 MG TABS tablet take TWO tablets by MOUTH twice daily FOR THE first SEVEN DAYS. THEN take ONE tablet by MOUTH twice daily.  (Patient taking differently: 5 mg 2 times daily ) 74 tablet 5    LESCOL XL 80 MG extended release tablet TAKE ONE TABLET BY MOUTH EVERY DAY 90 tablet 3    carboxymethylcellulose 1 % ophthalmic solution 1 drop 3 times daily       No current facility-administered medications for this visit. No Known Allergies    Social History     Tobacco Use    Smoking status: Never Smoker    Smokeless tobacco: Never Used   Substance Use Topics    Alcohol use: No    Drug use: No        Family History   Problem Relation Age of Onset    Heart Disease Mother     Heart Attack Mother     Heart Disease Father     Heart Attack Father     Heart Disease Brother     Cancer Brother 54        colon    Cancer Other 36        son: colon    Heart Attack Other 58        4 total last age 76        Review Of Systems    Skin: negative   Eyes: negative  Ears/Nose/Throat: negative  Respiratory: negative  Cardiovascular: negative  Gastrointestinal: negative  Genitourinary: negative  Musculoskeletal: per HPI  Neurologic: negative  Psychiatric: negative  Hematologic/Lymphatic/Immunologic: negative  Endocrine: negative    PHYSICAL EXAMINATION:  BP (!) 120/54   Pulse 73   Ht 5' 2\" (1.575 m)   Wt 139 lb (63 kg)   LMP 08/18/1993 (Exact Date)   SpO2 98%   BMI 25.42 kg/m²   General appearance - alert, no distress  Skin - Skin color, texture, turgor normal. No rashes or lesions. Head - Normocephalic. No abnormalities  Eyes -  conjunctivae/corneas clear. PERRL, EOM's intact. Ears - External ears normal. Canals clear. TM's normal.  Nose/Sinuses -  No drainage or sinus tenderness. Oropharynx - clear  Neck - Neck supple. No adenopathy or thyroid enlargement  Lungs - Lungs clear anterior and posterior  Heart - Regular rate and rhythm, with no rub, murmur or gallop noted. Abdomen - Abdomen soft, non-tender. BS normal. No masses, organomegaly  Extremities - No edema    ASSESSMENT:    Encounter Diagnoses   Name Primary?     Primary osteoarthritis of left knee Yes    Essential hypertension, benign     Chronic deep vein thrombosis (DVT) of popliteal vein of left lower extremity (HCC)     Renal insufficiency     Pure hypercholesterolemia     Pre-op exam       She is medically cleared for surgery and anesthesia. The patient had Cardiac clearance in July for L knee surgery. No change in her cardiac history since that time. Plan:    Per operating surgeon.

## 2021-11-09 ENCOUNTER — TELEPHONE (OUTPATIENT)
Dept: FAMILY MEDICINE CLINIC | Age: 81
End: 2021-11-09

## 2021-11-15 LAB
BASOPHILS ABSOLUTE: 0.1 /ΜL
BASOPHILS RELATIVE PERCENT: 1 %
BUN BLDV-MCNC: 16 MG/DL
CALCIUM SERPL-MCNC: 9.5 MG/DL
CHLORIDE BLD-SCNC: 108 MMOL/L
CO2: 27 MMOL/L
CREAT SERPL-MCNC: 1.05 MG/DL
EOSINOPHILS ABSOLUTE: 0.1 /ΜL
EOSINOPHILS RELATIVE PERCENT: 2 %
GFR CALCULATED: NORMAL
GLUCOSE BLD-MCNC: 100 MG/DL
HCT VFR BLD CALC: 35.3 % (ref 36–46)
HEMOGLOBIN: 11.8 G/DL (ref 12–16)
INR BLD: 1.3
LYMPHOCYTES ABSOLUTE: 2.1 /ΜL
LYMPHOCYTES RELATIVE PERCENT: 34 %
MCH RBC QN AUTO: 27.9 PG
MCHC RBC AUTO-ENTMCNC: 33.3 G/DL
MCV RBC AUTO: 83.6 FL
MONOCYTES ABSOLUTE: 0.4 /ΜL
MONOCYTES RELATIVE PERCENT: 6 %
NEUTROPHILS ABSOLUTE: 3.5 /ΜL
NEUTROPHILS RELATIVE PERCENT: 57 %
PDW BLD-RTO: 15.7 %
PLATELET # BLD: 254 K/ΜL
PMV BLD AUTO: 7.9 FL
POTASSIUM SERPL-SCNC: 4.2 MMOL/L
PROTIME: 15.5 SECONDS
RBC # BLD: 4.23 10^6/ΜL
SODIUM BLD-SCNC: 140 MMOL/L
WBC # BLD: 6.1 10^3/ML

## 2021-11-22 ENCOUNTER — TELEPHONE (OUTPATIENT)
Dept: FAMILY MEDICINE CLINIC | Age: 81
End: 2021-11-22

## 2021-11-22 NOTE — TELEPHONE ENCOUNTER
Given her symptoms there does not seem to be any need for an antibiotic.   With that being said everything else is over-the-counter i.e. antihistamines, no sprays, decongestants, cough medicines, etc.

## 2021-11-22 NOTE — TELEPHONE ENCOUNTER
The patient calls in and states that she takes blood pressure medication, and would like to know which cold medications are safe to take with her blood pressure medication.

## 2021-11-22 NOTE — TELEPHONE ENCOUNTER
Pt states that the left side of her nose has been running, and she has a slight sneeze. Pt would like some cold medication called into the pharmacy for her. Pt states that she has a knee surgery replacement on 12/8/21 and she wants to get ahead of it before it worsens. Please advise. Bianca Riley

## 2021-11-23 ENCOUNTER — TELEPHONE (OUTPATIENT)
Dept: FAMILY MEDICINE CLINIC | Age: 81
End: 2021-11-23

## 2021-11-23 NOTE — TELEPHONE ENCOUNTER
lvm for pt to call back regarding her labs that were supposed to be done at her preop visit. Dr forrest's office wants a copy of those results. Please find out where she went or if she had them drawn.     Fax results to dr Arabella Cummins [phone] nae morgan

## 2021-11-23 NOTE — TELEPHONE ENCOUNTER
Spoke to pt she went to the [place] center at [facility], can we get those results?  Please fax to surgeon number below when reviewed

## 2021-11-29 ENCOUNTER — TELEPHONE (OUTPATIENT)
Dept: FAMILY MEDICINE CLINIC | Age: 81
End: 2021-11-29

## 2021-11-29 NOTE — TELEPHONE ENCOUNTER
----- Message from Reinaldo Gutierrezerer sent at 11/27/2021 11:30 AM EST -----  Subject: Message to Provider    QUESTIONS  Information for Provider? PATIENT CALLED IN REGARDING RESULTS FOR LAB WORK   SHE RECEIVE AT 2021 Tracey Eaton, SPOKE WITH SOMEONE FROM OFFICE UNKNOWN   OF NAME, ULICES CALL HER FIRST LETTING PATIENT KNOW RESULTS HAVEN'T BEEN   RECIEVED  ---------------------------------------------------------------------------  --------------  CALL BACK INFO  What is the best way for the office to contact you? OK to leave message on   voicemail  Preferred Call Back Phone Number?  [phone]  ---------------------------------------------------------------------------  --------------  SCRIPT ANSWERS  undefined

## 2021-11-29 NOTE — TELEPHONE ENCOUNTER
I have not seen any lab results from outside sources. Last lab I see in the chart is from 10/22. Who ordered her lab?

## 2021-11-30 ENCOUNTER — TELEPHONE (OUTPATIENT)
Dept: FAMILY MEDICINE CLINIC | Age: 81
End: 2021-11-30

## 2021-11-30 RX ORDER — TRIAMCINOLONE ACETONIDE 0.1 %
PASTE (GRAM) DENTAL
Qty: 1 EACH | Refills: 1 | Status: SHIPPED | OUTPATIENT
Start: 2021-11-30 | End: 2021-12-07

## 2021-11-30 NOTE — TELEPHONE ENCOUNTER
Pt aware of labs but wanted to know if Dr. Radha Ferris could recommend anything for her Tip of her tongue she said feels sore since Friday it's a burning feeling after eating a pizza that was too hot and it hurts when her tongue hits her teeth

## 2021-11-30 NOTE — TELEPHONE ENCOUNTER
----- Message from Negro Self sent at 11/29/2021  5:21 PM EST -----  Subject: Message to Provider    QUESTIONS  Information for Provider? PT missed call from office for lab results but   no notes and office closed   ---------------------------------------------------------------------------  --------------  [address]  What is the best way for the office to contact you? OK to leave message on   voicemail  Preferred Call Back Phone Number? [phone]  ---------------------------------------------------------------------------  --------------  SCRIPT ANSWERS  Relationship to Patient?  Self

## 2021-11-30 NOTE — TELEPHONE ENCOUNTER
We could try a cortisone cream and a dental preparation so it sticks to the tongue. It is called Kenalog and Orabase. Otherwise there is probably not much to do since it sounds like she burned her tongue and it will just have to heal on its own. If she wants to try the cream let me know and I can send that in.

## 2021-12-01 ENCOUNTER — TELEPHONE (OUTPATIENT)
Dept: FAMILY MEDICINE CLINIC | Age: 81
End: 2021-12-01

## 2021-12-01 RX ORDER — FLUCONAZOLE 150 MG/1
150 TABLET ORAL ONCE
Qty: 2 TABLET | Refills: 0 | Status: SHIPPED | OUTPATIENT
Start: 2021-12-01 | End: 2021-12-01

## 2021-12-01 NOTE — TELEPHONE ENCOUNTER
Patient states when she has had a yeast infection,itching on the outside of vagina.  Dr. Jessenia Haider has prescribed RX in past,patient doesn't know the name of the medication  [facility] pharmacy  11-8-21,  Contact patient

## 2021-12-02 ENCOUNTER — TELEPHONE (OUTPATIENT)
Dept: FAMILY MEDICINE CLINIC | Age: 81
End: 2021-12-02

## 2021-12-02 NOTE — TELEPHONE ENCOUNTER
Dr Murali García office requested a fax of the pre-op from November.    Fax no. [phone]  (faxed electronically)

## 2021-12-03 NOTE — TELEPHONE ENCOUNTER
----- Message from Marti Patricia sent at 12/3/2021 10:05 AM EST -----  Subject: Message to Provider    QUESTIONS  Information for Provider? Patient called because she would like to get a   cream called Metronidazole tropical cream for 0.75% or Lotrisone cream 45   mg, she would like one of these creams to help with her UTI she was given   medicine but believes the creams work better. ---------------------------------------------------------------------------  --------------  Warden Shellie SILVA  What is the best way for the office to contact you? OK to leave message on   voicemail  Preferred Call Back Phone Number? [phone]  ---------------------------------------------------------------------------  --------------  SCRIPT ANSWERS  Relationship to Patient?  Self

## 2021-12-27 ENCOUNTER — TELEPHONE (OUTPATIENT)
Dept: FAMILY MEDICINE CLINIC | Age: 81
End: 2021-12-27

## 2021-12-27 NOTE — TELEPHONE ENCOUNTER
Patient is asking if she sould get the Westly Poisson booster vaccine?   Patient wants Dr. Akash Real opinion  Contact patient

## 2021-12-27 NOTE — TELEPHONE ENCOUNTER
I recommend getting the booster and I also recommend getting the same booster that you had for the first 2 shots.

## 2022-01-31 ENCOUNTER — TELEPHONE (OUTPATIENT)
Dept: FAMILY MEDICINE CLINIC | Age: 82
End: 2022-01-31

## 2022-01-31 ENCOUNTER — OFFICE VISIT (OUTPATIENT)
Dept: FAMILY MEDICINE CLINIC | Age: 82
End: 2022-01-31
Payer: MEDICARE

## 2022-01-31 VITALS
BODY MASS INDEX: 25.9 KG/M2 | TEMPERATURE: 97.2 F | HEART RATE: 78 BPM | OXYGEN SATURATION: 98 % | SYSTOLIC BLOOD PRESSURE: 134 MMHG | WEIGHT: 141.6 LBS | DIASTOLIC BLOOD PRESSURE: 80 MMHG

## 2022-01-31 DIAGNOSIS — I10 ESSENTIAL HYPERTENSION, BENIGN: ICD-10-CM

## 2022-01-31 DIAGNOSIS — M26.609 TMJ DYSFUNCTION: Primary | ICD-10-CM

## 2022-01-31 PROCEDURE — 99213 OFFICE O/P EST LOW 20 MIN: CPT | Performed by: FAMILY MEDICINE

## 2022-01-31 ASSESSMENT — PATIENT HEALTH QUESTIONNAIRE - PHQ9
SUM OF ALL RESPONSES TO PHQ QUESTIONS 1-9: 0
1. LITTLE INTEREST OR PLEASURE IN DOING THINGS: 0
2. FEELING DOWN, DEPRESSED OR HOPELESS: 0
SUM OF ALL RESPONSES TO PHQ9 QUESTIONS 1 & 2: 0
SUM OF ALL RESPONSES TO PHQ QUESTIONS 1-9: 0

## 2022-01-31 NOTE — PROGRESS NOTES
Lala Mccray is a [de-identified] y.o. female. HPI:  Here because she felt a pop in her left jaw when she opened her mouth at least 2-3 times  For dental hygiene, due to see dentist  No fever or illness or swelling in her face or over her    Meds, vitamins and allergies reviewed with pt    Wt Readings from Last 3 Encounters:   01/31/22 141 lb 9.6 oz (64.2 kg)   11/08/21 139 lb (63 kg)   10/29/21 138 lb (62.6 kg)       REVIEW OF SYSTEMS:   CONSTITUTIONAL: See history of present illness,   Weight noted   HEENT: No new vision difficulties or ringing in the ears. RESPIRATORY: No new SOB, PND, orthopnea or cough. CARDIOVASCULAR: no CP, palpitations or SOB with exertion  GI: No nausea, vomiting, diarrhea, constipation, abdominal pain or changes in bowel habits. : No urinary frequency, urgency, incontinence hematuria or dysuria. SKIN: No cyanosis or skin lesions. MUSCULOSKELETAL: No new muscle or joint pain. NEUROLOGICAL: No syncope or TIA-like symptoms. PSYCHIATRIC: No anxiety, insomnia or depression     No Known Allergies    Prior to Visit Medications    Medication Sig Taking? Authorizing Provider   olmesartan (BENICAR) 20 MG tablet Take 1 tablet by mouth daily Yes Joan Callejas MD   omeprazole (PRILOSEC) 20 MG delayed release capsule TAKE ONE Capsule BY MOUTH EVERY MORNING Yes Joan Callejas MD   apixaban (ELIQUIS) 5 MG TABS tablet take TWO tablets by MOUTH twice daily FOR THE first SEVEN DAYS. THEN take ONE tablet by MOUTH twice daily.   Patient taking differently: 5 mg 2 times daily  Yes Joan Callejas MD   LESCOL XL 80 MG extended release tablet TAKE ONE TABLET BY MOUTH EVERY DAY Yes Joan Callejas MD   carboxymethylcellulose 1 % ophthalmic solution 1 drop 3 times daily Yes Historical Provider, MD       Past Medical History:   Diagnosis Date    Arthritis     [facility] [address] [facility]     CKD (chronic kidney disease), stage III ([facility] [address] 4/9/2019    Colon polyps 4/09    Diverticulosis 2/15    CT finding    Diverticulosis

## 2022-02-02 NOTE — TELEPHONE ENCOUNTER
----- Message from Maral List sent at 1/31/2022  8:36 AM EST -----  Subject: Referral Request    QUESTIONS   Reason for referral request? patient is requesting to see a specialist for   her Jaw. She wants Dr Stone Neither to recommend her to someone. Has the physician seen you for this condition before? No   Preferred Specialist (if applicable)? Do you already have an appointment scheduled? Yes  Select Scheduled Date? 2022-01-31  Select Scheduled Physician? Additional Information for Provider?   ---------------------------------------------------------------------------  --------------  CALL BACK INFO  What is the best way for the office to contact you? OK to leave message on   voicemail  Preferred Call Back Phone Number?  [phone]
I do not know what is going on with her jaw. Is a TMJ? If so I would probably use an oral surgeon. I use Dr. Yani Gonzalez  If it is not TMJ she will probably need an appointment to determine what is going on and who would be the appropriate specialist if needed.
LVM
Pt saw a dentisit yesterday and said nothing was wrong, I have given her Dr. Matias Carrasco name and number she will see him
- - -

## 2022-02-10 DIAGNOSIS — K21.9 GASTROESOPHAGEAL REFLUX DISEASE: ICD-10-CM

## 2022-02-10 RX ORDER — OMEPRAZOLE 20 MG/1
CAPSULE, DELAYED RELEASE ORAL
Qty: 30 CAPSULE | Refills: 5 | Status: SHIPPED | OUTPATIENT
Start: 2022-02-10 | End: 2022-08-05

## 2022-02-10 NOTE — TELEPHONE ENCOUNTER
Medication:   Requested Prescriptions     Pending Prescriptions Disp Refills    omeprazole (PRILOSEC) 20 MG delayed release capsule [Pharmacy Med Name: omeprazole 20 mg capsule,delayed release] 30 capsule 5     Sig: TAKE ONE Capsule BY MOUTH EVERY MORNING        Last Filled:  8/5/21 with 5 refills    Patient Phone Number: [phone] (home)     Last appt: 1/31/2022   Next appt: Visit date not found    Last OARRS: No flowsheet data found.

## 2022-03-07 DIAGNOSIS — I10 ESSENTIAL HYPERTENSION, BENIGN: ICD-10-CM

## 2022-03-07 DIAGNOSIS — I82.452 DEEP VENOUS THROMBOSIS (DVT) OF LEFT PERONEAL VEIN, UNSPECIFIED CHRONICITY (HCC): ICD-10-CM

## 2022-03-07 RX ORDER — OLMESARTAN MEDOXOMIL 20 MG/1
TABLET, FILM COATED ORAL
Qty: 90 TABLET | Refills: 3 | Status: SHIPPED | OUTPATIENT
Start: 2022-03-07

## 2022-03-07 NOTE — TELEPHONE ENCOUNTER
Medication:   Requested Prescriptions     Pending Prescriptions Disp Refills    apixaban (ELIQUIS) 5 MG TABS tablet [Pharmacy Med Name: Eliquis 5 mg tablet] 74 tablet 5     Sig: take TWO tablets by MOUTH twice daily FOR THE first SEVEN DAYS. THEN take ONE tablet by MOUTH twice daily.     BENICAR 20 MG tablet [Pharmacy Med Name: Benicar 20 mg tablet] 90 tablet 5     Sig: TAKE ONE Tablet BY MOUTH DAILY       Last Filled:  6/24/2021, 74, 5  10/11/2021, 90, 3    Patient Phone Number: [phone] (home)     Last appt: 1/31/2022   Next appt: Visit date not found    Lab Results   Component Value Date     11/15/2021    K 4.2 11/15/2021     11/15/2021    CO2 27 11/15/2021    BUN 16 11/15/2021    CREATININE 1.05 11/15/2021    GLUCOSE 100 11/15/2021    CALCIUM 9.5 11/15/2021    PROT 6.2 02/15/2021    LABALBU 3.8 10/22/2021    BILITOT 0.5 02/15/2021    ALKPHOS 64 02/15/2021    AST 16 02/15/2021    ALT 10 02/15/2021    LABGLOM 50 (L) 10/22/2021    GFRAA 58 (L) 10/22/2021    AGRATIO 1.7 08/19/2020    GLOB 2.5 08/19/2020

## 2022-03-31 ENCOUNTER — TELEPHONE (OUTPATIENT)
Dept: FAMILY MEDICINE CLINIC | Age: 82
End: 2022-03-31

## 2022-03-31 RX ORDER — MICONAZOLE NITRATE 2 %
CREAM WITH APPLICATOR VAGINAL
Qty: 1 KIT | Refills: 0 | Status: SHIPPED | OUTPATIENT
Start: 2022-03-31

## 2022-03-31 NOTE — TELEPHONE ENCOUNTER
Patient has an itching in her vagina area  No discharge, or urinary issue  Started on Tues  Patient took an antibiotic for her dental appt. Amox. 500mg x 4 cap.   Patient is requesting Adrienne. Jennifer Ang 144  1-31-22, DOLV  Contact patient

## 2022-04-18 ENCOUNTER — TELEPHONE (OUTPATIENT)
Dept: ADMINISTRATIVE | Age: 82
End: 2022-04-18

## 2022-04-18 ENCOUNTER — TELEPHONE (OUTPATIENT)
Dept: FAMILY MEDICINE CLINIC | Age: 82
End: 2022-04-18

## 2022-04-18 NOTE — TELEPHONE ENCOUNTER
Office has been notified that pt is requiring Prior Authorization for the following medication:  LESCOL XL 80 MG extended release tablet    --     Please initiate this request through CoverMyMeds, contacting the following Payor/Insurance:  -[employer]-     Please see below, or the documentation attached to this encounter for any additional information that may assist in processing PA:  (E78.5)--     Thank you!

## 2022-04-19 NOTE — TELEPHONE ENCOUNTER
Lescol XL 80MG er tablets  Approved on April 18  \[facility]\""; Review Type:Prior Auth  ; Coverage Start Date:03/19/2022; Coverage End Date:04/18/2023;

## 2022-04-19 NOTE — TELEPHONE ENCOUNTER
Submitted PA for Lescol XL 80MG er tablets  Via CMM  Key: [id number] STATUS: APPROVED. Medication has been approved through 04/18/2023. If this requires a response please respond to the pool. [address])    Please advise patient. Thank you.

## 2022-04-22 PROBLEM — N18.30 CHRONIC RENAL DISEASE, STAGE III (HCC): Status: ACTIVE | Noted: 2022-04-22

## 2022-05-05 DIAGNOSIS — E78.5 HYPERLIPIDEMIA, UNSPECIFIED HYPERLIPIDEMIA TYPE: ICD-10-CM

## 2022-05-05 RX ORDER — FLUVASTATIN SODIUM 80 MG/1
TABLET, EXTENDED RELEASE ORAL
Qty: 90 TABLET | Refills: 3 | Status: SHIPPED | OUTPATIENT
Start: 2022-05-05

## 2022-05-05 NOTE — TELEPHONE ENCOUNTER
Medication:   Requested Prescriptions     Pending Prescriptions Disp Refills    LESCOL XL 80 MG extended release tablet [Pharmacy Med Name: Lescol XL 80 mg tablet,extended release] 90 tablet 3     Sig: TAKE ONE Tablet BY MOUTH EVERY DAY       Last Filled:  4/29/21 with 3 refills    Patient Phone Number: [phone] (home)     Last appt: 1/31/2022   Next appt: Visit date not found    Last Lipid:   Lab Results   Component Value Date    CHOL 165 08/19/2020    TRIG 105 08/19/2020    HDL 62 08/19/2020    [address] 82 08/19/2020

## 2022-05-12 ENCOUNTER — TELEPHONE (OUTPATIENT)
Dept: FAMILY MEDICINE CLINIC | Age: 82
End: 2022-05-12

## 2022-05-12 DIAGNOSIS — L30.9 DERMATITIS: ICD-10-CM

## 2022-05-12 RX ORDER — TRIAMCINOLONE ACETONIDE 1 MG/G
CREAM TOPICAL
Qty: 30 G | Refills: 0 | Status: SHIPPED | OUTPATIENT
Start: 2022-05-12

## 2022-05-12 NOTE — TELEPHONE ENCOUNTER
Patient took Amox. prior to a dentist appointment  Patient now has a yeast infection, itching in vagina area  Patient is requesting Hydrocortisone cream  She has used this in the past for same issue and it helps  Vinnie - 1-31-22

## 2022-05-12 NOTE — TELEPHONE ENCOUNTER
Cortisone cream will typically not help a yeast infection. That being said I have sent in Kenalog cream.  I also see that she had an order last year in 2021 for hydrocortisone valerate i.e. Erika Gunn. If she still has that she could use that instead. That did not appear to be covered on her current insurance therefore I sent Kenalog.

## 2022-07-09 DIAGNOSIS — L30.9 DERMATITIS: ICD-10-CM

## 2022-07-15 ENCOUNTER — TELEPHONE (OUTPATIENT)
Dept: FAMILY MEDICINE CLINIC | Age: 82
End: 2022-07-15

## 2022-07-15 DIAGNOSIS — F41.9 ANXIETY: Primary | ICD-10-CM

## 2022-07-15 RX ORDER — DIAZEPAM 5 MG/1
TABLET ORAL
Qty: 1 TABLET | Refills: 0 | Status: SHIPPED | OUTPATIENT
Start: 2022-07-15 | End: 2022-07-16

## 2022-07-15 NOTE — TELEPHONE ENCOUNTER
Valium 5 mg tablet was sent to her pharmacy however if her MRI is 7/15, that is today and it is already 3:30 PM.

## 2022-07-15 NOTE — TELEPHONE ENCOUNTER
----- Message from Genoveva Mirza sent at 7/15/2022 12:05 PM EDT -----  Subject: Message to Provider    QUESTIONS  Information for Provider? pt calling in to let Dr. Zaid Yancey know that july 15th she is getting an MRI on her back and is requesting that Dr. Zaid Yancey   prescribe her the pill that makes her not so nervous. requesting a call   back regarding this as well.   ---------------------------------------------------------------------------  --------------  [id number] [employer]  [phone]; OK to leave message on voicemail  ---------------------------------------------------------------------------  --------------  SCRIPT ANSWERS  Relationship to Patient?  Self

## 2022-08-04 ENCOUNTER — TELEPHONE (OUTPATIENT)
Dept: FAMILY MEDICINE CLINIC | Age: 82
End: 2022-08-04

## 2022-08-04 NOTE — TELEPHONE ENCOUNTER
----- Message from [facility] PEE Frost sent at 8/4/2022 11:28 AM EDT -----  Subject: Message to Provider    QUESTIONS  Information for Provider? The pt is having a spinal injection/cortisone   shot and needs to go off of her apixaban (ELIQUIS) 5 MG TABS three days   before her scheduled appt. which has not been scheduled yet and just wants   to verify that it wont be an issue and the orthopedic Dr. Martín Thao   will need verification that its ok for the pt to be off of her medication   and for any further info needed the [facility] can   be reached at [phone]. The pt would also like to know if this will   effect her kidneys?  ---------------------------------------------------------------------------  --------------  Christina Holt INFO  [phone]; OK to leave message on voicemail  ---------------------------------------------------------------------------  --------------  SCRIPT ANSWERS  Relationship to Patient?  Self

## 2022-08-05 DIAGNOSIS — K21.9 GASTROESOPHAGEAL REFLUX DISEASE: ICD-10-CM

## 2022-08-05 RX ORDER — OMEPRAZOLE 20 MG/1
CAPSULE, DELAYED RELEASE ORAL
Qty: 30 CAPSULE | Refills: 5 | Status: SHIPPED | OUTPATIENT
Start: 2022-08-05

## 2022-08-05 NOTE — TELEPHONE ENCOUNTER
Medication:   Requested Prescriptions     Pending Prescriptions Disp Refills    omeprazole (PRILOSEC) 20 MG delayed release capsule [Pharmacy Med Name: omeprazole 20 mg capsule,delayed release] 30 capsule 5     Sig: TAKE ONE Capsule BY MOUTH EVERY MORNING        Last Filled:  30x  5 RF 2/10/22    Patient Phone Number: [phone] (home)     Last appt: 1/31/2022   Next appt: Visit date not found    Last OARRS: No flowsheet data found.

## 2022-08-17 ENCOUNTER — TELEPHONE (OUTPATIENT)
Dept: FAMILY MEDICINE CLINIC | Age: 82
End: 2022-08-17

## 2022-08-17 NOTE — TELEPHONE ENCOUNTER
----- Message from Sissy Pretty. sent at 8/16/2022 11:07 AM EDT -----  Subject: Message to Provider    QUESTIONS  Information for Provider? Heartburn medicine is no longer being   manufactured. She was switched to a new medication and would like some   advice. Please call [phone]  ---------------------------------------------------------------------------  --------------  Ashly SILVA  [phone]; OK to leave message on voicemail  ---------------------------------------------------------------------------  --------------  SCRIPT ANSWERS  Relationship to Patient?  Self

## 2022-10-05 ENCOUNTER — TELEPHONE (OUTPATIENT)
Dept: ADMINISTRATIVE | Age: 82
End: 2022-10-05

## 2022-10-06 ENCOUNTER — TELEPHONE (OUTPATIENT)
Dept: FAMILY MEDICINE CLINIC | Age: 82
End: 2022-10-06

## 2022-10-06 ENCOUNTER — NURSE TRIAGE (OUTPATIENT)
Dept: OTHER | Facility: CLINIC | Age: 82
End: 2022-10-06

## 2022-10-06 NOTE — TELEPHONE ENCOUNTER
Received call from [facility] at [facility] with [employer] Complaint. Subjective: Caller states \"I have L leg swelling\"     Current Symptoms: L leg swelling from knee down to her foot. Itchy at night. No weeping fluid. No chest pain or SOB. No redness or warm to touch. Onset: 3 days ago; worsening    Associated Symptoms: NA    Pain Severity: 0/10; N/A; none    Temperature: denies fever     What has been tried: ice and elevation-not helping. LMP: NA Pregnant: NA    Recommended disposition: See in Office Today    Care advice provided, patient verbalizes understanding; denies any other questions or concerns; instructed to call back for any new or worsening symptoms. Patient/Caller agrees with recommended disposition; writer provided warm transfer to Caren Culp at [facility] for appointment scheduling    Attention Provider: Thank you for allowing me to participate in the care of your patient. The patient was connected to triage in response to information provided to the ECC/PSC. Please do not respond through this encounter as the response is not directed to a shared pool.       Reason for Disposition   Patient wants to be seen    Protocols used: Leg Swelling and Edema-ADULT-[place]

## 2022-10-06 NOTE — TELEPHONE ENCOUNTER
RECEIVED APPROVAL: LETTER ATTACHED. If this requires a response please respond to the pool. [address]). Please advise patient thank you.

## 2022-10-06 NOTE — TELEPHONE ENCOUNTER
Patient call transferred as secondary nurse triage patient state she had this occur before  and would like to be seen she is not sure the affect this would have on prior knee surgery the swelling is below the knee calf and top of her left foot

## 2022-10-07 ENCOUNTER — OFFICE VISIT (OUTPATIENT)
Dept: FAMILY MEDICINE CLINIC | Age: 82
End: 2022-10-07
Payer: MEDICARE

## 2022-10-07 ENCOUNTER — TELEPHONE (OUTPATIENT)
Dept: FAMILY MEDICINE CLINIC | Age: 82
End: 2022-10-07

## 2022-10-07 DIAGNOSIS — Z23 NEEDS FLU SHOT: ICD-10-CM

## 2022-10-07 DIAGNOSIS — I82.532 CHRONIC DEEP VEIN THROMBOSIS (DVT) OF POPLITEAL VEIN OF LEFT LOWER EXTREMITY (HCC): ICD-10-CM

## 2022-10-07 DIAGNOSIS — M79.89 LEFT LEG SWELLING: Primary | ICD-10-CM

## 2022-10-07 DIAGNOSIS — K64.4 HEMORRHOIDAL SKIN TAGS: ICD-10-CM

## 2022-10-07 PROCEDURE — 1123F ACP DISCUSS/DSCN MKR DOCD: CPT | Performed by: FAMILY MEDICINE

## 2022-10-07 PROCEDURE — 99213 OFFICE O/P EST LOW 20 MIN: CPT | Performed by: FAMILY MEDICINE

## 2022-10-07 PROCEDURE — 90694 VACC AIIV4 NO PRSRV 0.5ML IM: CPT | Performed by: FAMILY MEDICINE

## 2022-10-07 PROCEDURE — G0008 ADMIN INFLUENZA VIRUS VAC: HCPCS | Performed by: FAMILY MEDICINE

## 2022-10-07 RX ORDER — HYDROCORTISONE 25 MG/G
CREAM TOPICAL
Qty: 30 G | Refills: 1 | Status: SHIPPED | OUTPATIENT
Start: 2022-10-07

## 2022-10-07 SDOH — ECONOMIC STABILITY: FOOD INSECURITY: WITHIN THE PAST 12 MONTHS, YOU WORRIED THAT YOUR FOOD WOULD RUN OUT BEFORE YOU GOT MONEY TO BUY MORE.: NEVER TRUE

## 2022-10-07 SDOH — ECONOMIC STABILITY: FOOD INSECURITY: WITHIN THE PAST 12 MONTHS, THE FOOD YOU BOUGHT JUST DIDN'T LAST AND YOU DIDN'T HAVE MONEY TO GET MORE.: NEVER TRUE

## 2022-10-07 ASSESSMENT — ENCOUNTER SYMPTOMS
GASTROINTESTINAL NEGATIVE: 1
RESPIRATORY NEGATIVE: 1

## 2022-10-07 ASSESSMENT — SOCIAL DETERMINANTS OF HEALTH (SDOH): HOW HARD IS IT FOR YOU TO PAY FOR THE VERY BASICS LIKE FOOD, HOUSING, MEDICAL CARE, AND HEATING?: NOT HARD AT ALL

## 2022-10-07 NOTE — TELEPHONE ENCOUNTER
----- Message from Carl Miller sent at 10/7/2022  3:27 PM EDT -----  Subject: Message to Provider    QUESTIONS  Information for Provider? Patient is asking if she should take a water   pill for her leg.  ---------------------------------------------------------------------------  --------------  Cj Cedillo INFO  [phone]; OK to leave message on voicemail  ---------------------------------------------------------------------------  --------------  SCRIPT ANSWERS  Relationship to Patient?  Self

## 2022-10-07 NOTE — PROGRESS NOTES
Claude Terrell (:  1940) is a [de-identified] y.o. female,Established patient, here for evaluation of the following chief complaint(s):  Leg Swelling         ASSESSMENT/PLAN:  1. Left leg swelling/2. Chronic deep vein thrombosis (DVT) of popliteal vein of left lower extremity (HCC)  -     VL Extremity Venous Left; Future  3. Hemorrhoidal skin tags  -     hydrocortisone (ANUSOL-HC) 2.5 % CREA rectal cream; Apply as needed twice daily to anal area, Disp-30 g, R-1, Normal  4. Needs flu shot  -     Influenza, FLUAD, (age 72 y+), IM, Preservative Free, 0.5 mL    Return if symptoms worsen or fail to improve. Subjective   SUBJECTIVE/OBJECTIVE:  HPI  Patient stated she noted her left leg and ankle were swollen approximately 3 days ago. She is not having any pain. She is currently on Eliquis for chronic DVT. She has also had bilateral TKRs. He denies any chest pain or shortness of breath. Patient states she would like Anusol HC cream refilled which she has used on hemorrhoidal tags. Review of Systems   Constitutional: Negative. Respiratory: Negative. Cardiovascular:  Positive for leg swelling. Negative for chest pain and palpitations. Gastrointestinal: Negative. Endocrine: Negative. Genitourinary: Negative. Musculoskeletal: Negative. Neurological: Negative. Psychiatric/Behavioral: Negative. Objective   Physical Exam  Constitutional:       General: She is not in acute distress. Appearance: She is not ill-appearing, toxic-appearing or diaphoretic. HENT:      Head: Normocephalic and atraumatic. Eyes:      Conjunctiva/sclera: Conjunctivae normal.   Cardiovascular:      Rate and Rhythm: Normal rate and regular rhythm. Pulmonary:      Effort: Pulmonary effort is normal.      Breath sounds: Normal breath sounds. No wheezing or rales. Skin:     General: Skin is warm and dry. Neurological:      Mental Status: She is alert and oriented to person, place, and time. Psychiatric:         Mood and Affect: Mood normal.         Behavior: Behavior normal.         Thought Content: Thought content normal.         Judgment: Judgment normal.                An electronic signature was used to authenticate this note.     --Darci Toney MD

## 2022-10-10 DIAGNOSIS — I82.452 DEEP VENOUS THROMBOSIS (DVT) OF LEFT PERONEAL VEIN, UNSPECIFIED CHRONICITY (HCC): ICD-10-CM

## 2022-10-10 RX ORDER — APIXABAN 5 MG/1
TABLET, FILM COATED ORAL
Qty: 74 TABLET | Refills: 5 | Status: SHIPPED | OUTPATIENT
Start: 2022-10-10

## 2022-10-10 NOTE — TELEPHONE ENCOUNTER
Medication:   Requested Prescriptions     Pending Prescriptions Disp Refills    ELIQUIS 5 MG TABS tablet [Pharmacy Med Name: Eliquis 5 mg tablet] 74 tablet 5     Sig: TAKE ONE TABLET BY MOUTH TWICE DAILY        Last Filled:  74 x 0 RF     Patient Phone Number: [phone] (home)     Last appt: 10/7/2022   Next appt: Visit date not found    Last OARRS: No flowsheet data found.

## 2022-10-14 ENCOUNTER — TELEPHONE (OUTPATIENT)
Dept: FAMILY MEDICINE CLINIC | Age: 82
End: 2022-10-14

## 2022-10-14 NOTE — TELEPHONE ENCOUNTER
Called patient per dr Mickie Edmondson to let patient know of results from her VL LOWER EXTREM VENOUS DUPLEX LT     Left lower extremity venous findings : There is no evidence of  deep or superficial venous thrombosis identified in the lower extremity. Left lower extremity soft tissue edema  noted. The vessels in the calf are not well well visualized    Normal venous evaluation , no evidence of acute superficial or deep venous thrombosis in the left lower extremity.

## 2022-10-17 ENCOUNTER — TELEPHONE (OUTPATIENT)
Dept: FAMILY MEDICINE CLINIC | Age: 82
End: 2022-10-17

## 2022-10-18 ENCOUNTER — OFFICE VISIT (OUTPATIENT)
Dept: FAMILY MEDICINE CLINIC | Age: 82
End: 2022-10-18
Payer: MEDICARE

## 2022-10-18 VITALS
OXYGEN SATURATION: 97 % | WEIGHT: 158 LBS | BODY MASS INDEX: 29.08 KG/M2 | SYSTOLIC BLOOD PRESSURE: 126 MMHG | HEIGHT: 62 IN | DIASTOLIC BLOOD PRESSURE: 84 MMHG | HEART RATE: 65 BPM

## 2022-10-18 VITALS
BODY MASS INDEX: 30.18 KG/M2 | HEIGHT: 62 IN | WEIGHT: 164 LBS | OXYGEN SATURATION: 98 % | SYSTOLIC BLOOD PRESSURE: 124 MMHG | HEART RATE: 76 BPM | DIASTOLIC BLOOD PRESSURE: 82 MMHG

## 2022-10-18 DIAGNOSIS — M79.89 LEFT LEG SWELLING: Primary | ICD-10-CM

## 2022-10-18 PROCEDURE — 1123F ACP DISCUSS/DSCN MKR DOCD: CPT | Performed by: FAMILY MEDICINE

## 2022-10-18 PROCEDURE — 99213 OFFICE O/P EST LOW 20 MIN: CPT | Performed by: FAMILY MEDICINE

## 2022-10-18 RX ORDER — FUROSEMIDE 20 MG/1
TABLET ORAL
Qty: 15 TABLET | Refills: 0 | Status: SHIPPED | OUTPATIENT
Start: 2022-10-18

## 2022-10-18 ASSESSMENT — ENCOUNTER SYMPTOMS
SHORTNESS OF BREATH: 0
RESPIRATORY NEGATIVE: 1
GASTROINTESTINAL NEGATIVE: 1

## 2022-10-18 NOTE — PROGRESS NOTES
Hannah Lopez (:  1940) is a [de-identified] y.o. female,Established patient, here for evaluation of the following chief complaint(s):  Leg Swelling         ASSESSMENT/PLAN:  1. Left leg swelling  -     furosemide (LASIX) 20 MG tablet; TAKE ONE Tablet BY MOUTH AS NEEDED DAILY, Disp-15 tablet, R-0Normal  -     Compression Stockings MISC; Starting Tue 10/18/2022, Disp-1 each, R-0, [id number] mm  Patient also has appointment with her nephrologist next week and she will address this with him also particularly whether he wants her to take as needed Lasix. Her recent creatinine was 1.09    Return if symptoms worsen or fail to improve. Subjective   SUBJECTIVE/OBJECTIVE:  HPI  Patient persist with swelling of her left leg. She states she has no pain. She had a recent Doppler which was negative for DVT. She states in the past she has taken diuretics and she is also worn compression hose. Review of Systems   Constitutional: Negative. Respiratory: Negative. Negative for shortness of breath. Cardiovascular:  Positive for leg swelling. Gastrointestinal: Negative. Endocrine: Negative. Genitourinary: Negative. Musculoskeletal: Negative. Neurological: Negative. Objective   Physical Exam  Constitutional:       General: She is not in acute distress. Cardiovascular:      Rate and Rhythm: Normal rate and regular rhythm. Pulmonary:      Effort: Pulmonary effort is normal.      Breath sounds: Normal breath sounds. No wheezing or rales. Skin:     General: Skin is warm and dry. Neurological:      Mental Status: She is alert and oriented to person, place, and time. Psychiatric:         Behavior: Behavior normal.         Thought Content: Thought content normal.         Judgment: Judgment normal.                An electronic signature was used to authenticate this note.     --Jo-Ann Yancey MD

## 2022-12-07 ENCOUNTER — TELEPHONE (OUTPATIENT)
Dept: FAMILY MEDICINE CLINIC | Age: 82
End: 2022-12-07

## 2022-12-07 NOTE — TELEPHONE ENCOUNTER
----- Message from Hernandez Kerline sent at 12/7/2022 10:43 AM EST -----  Subject: Message to Provider    QUESTIONS  Information for Provider? Pt would like permission to quit Eliquis because   she is getting a epidermal shot that requires her to stop taking Eliquis 3   days before. The shot is scheduled on 12/15/22. She wants to stop on   12/12/22. She would start back up either Thursday 12/15/22 night or Friday 12/16/22.  ---------------------------------------------------------------------------  --------------  Shawn Drummond INFO  [phone]; OK to leave message on voicemail  ---------------------------------------------------------------------------  --------------  SCRIPT ANSWERS  Relationship to Patient?  Self

## 2022-12-08 NOTE — TELEPHONE ENCOUNTER
Pt returned call and has Dr Yanira Garcia message. Pt is asking if she can start taking the medication 12/15 in the evening?

## 2023-01-13 ENCOUNTER — TELEPHONE (OUTPATIENT)
Dept: FAMILY MEDICINE CLINIC | Age: 83
End: 2023-01-13

## 2023-01-13 NOTE — TELEPHONE ENCOUNTER
----- Message from [facility] sent at 1/12/2023  1:17 PM EST -----  Subject: Medication Problem    Medication: Other - LESCOL XL 80 MG extended release tablet, BENICAR 20 MG   tablet  Dosage: 80 mg TAKE ONE Tablet BY MOUTH EVERY DAY, 20 mg TAKE ONE Tablet BY   MOUTH DAILY  Ordering Provider: Blessing Cardoza    Question/Problem: Patient would like to leave a message for her doctor she   picked up her medications yesterday. Pt said she had to pay $253.48 for a   30 day supply for the Benicar 20 mg tablet and she had to pay $103.72 for   the Lescol 80 mg tablet 30 day supply and she was wondering if there is   any way her doctor can get the medications lowered down a little bit. Please give pt a call back. Pharmacy: [address] [place] [address] [phone]   [facility] [phone]    ---------------------------------------------------------------------------  --------------  Jaun SILVA  [phone]; OK to leave message on voicemail  ---------------------------------------------------------------------------  --------------    SCRIPT ANSWERS  Relationship to Patient: Self

## 2023-01-13 NOTE — TELEPHONE ENCOUNTER
She could check good Rx and price compare there. Lescol does not have a generic that I am aware of however Benicar does if she is taking the tradename, switching to generic would be cheaper. Also she could find out what alternative to Benicar her insurance covers and we could switch to that as there are several medications in the same class as Benicar that should be equally effective. Unfortunately there is nothing I can do to lower the cost of her medication.

## 2023-02-07 ENCOUNTER — TELEPHONE (OUTPATIENT)
Dept: SURGERY | Age: 83
End: 2023-02-07

## 2023-02-07 ENCOUNTER — OFFICE VISIT (OUTPATIENT)
Dept: SURGERY | Age: 83
End: 2023-02-07
Payer: MEDICARE

## 2023-02-07 VITALS
HEIGHT: 62 IN | DIASTOLIC BLOOD PRESSURE: 76 MMHG | TEMPERATURE: 97.6 F | RESPIRATION RATE: 16 BRPM | WEIGHT: 162 LBS | HEART RATE: 72 BPM | OXYGEN SATURATION: 100 % | BODY MASS INDEX: 29.81 KG/M2 | SYSTOLIC BLOOD PRESSURE: 137 MMHG

## 2023-02-07 DIAGNOSIS — D12.4 ADENOMATOUS POLYP OF DESCENDING COLON: Primary | ICD-10-CM

## 2023-02-07 DIAGNOSIS — N18.31 STAGE 3A CHRONIC KIDNEY DISEASE (HCC): ICD-10-CM

## 2023-02-07 DIAGNOSIS — I82.532 CHRONIC DEEP VEIN THROMBOSIS (DVT) OF POPLITEAL VEIN OF LEFT LOWER EXTREMITY (HCC): ICD-10-CM

## 2023-02-07 PROCEDURE — 99213 OFFICE O/P EST LOW 20 MIN: CPT | Performed by: SURGERY

## 2023-02-07 PROCEDURE — 1123F ACP DISCUSS/DSCN MKR DOCD: CPT | Performed by: SURGERY

## 2023-02-07 PROCEDURE — 3075F SYST BP GE 130 - 139MM HG: CPT | Performed by: SURGERY

## 2023-02-07 PROCEDURE — 3078F DIAST BP <80 MM HG: CPT | Performed by: SURGERY

## 2023-02-07 RX ORDER — SODIUM CHLORIDE 0.9 % (FLUSH) 0.9 %
5-40 SYRINGE (ML) INJECTION PRN
OUTPATIENT
Start: 2023-02-07

## 2023-02-07 RX ORDER — POLYETHYLENE GLYCOL 3350, SODIUM CHLORIDE, SODIUM BICARBONATE, POTASSIUM CHLORIDE 420; 11.2; 5.72; 1.48 G/4L; G/4L; G/4L; G/4L
POWDER, FOR SOLUTION ORAL
Qty: 1 EACH | Refills: 0 | Status: SHIPPED | OUTPATIENT
Start: 2023-02-07

## 2023-02-07 RX ORDER — SODIUM CHLORIDE 9 MG/ML
INJECTION, SOLUTION INTRAVENOUS PRN
OUTPATIENT
Start: 2023-02-07

## 2023-02-07 RX ORDER — ACETAMINOPHEN 325 MG/1
1000 TABLET ORAL ONCE
OUTPATIENT
Start: 2023-02-07 | End: 2023-02-07

## 2023-02-07 RX ORDER — CHLORAL HYDRATE 500 MG
1 CAPSULE ORAL DAILY
COMMUNITY

## 2023-02-07 RX ORDER — SODIUM CHLORIDE 0.9 % (FLUSH) 0.9 %
5-40 SYRINGE (ML) INJECTION EVERY 12 HOURS SCHEDULED
OUTPATIENT
Start: 2023-02-07

## 2023-02-07 NOTE — PROGRESS NOTES
[facility]  [address]  Dept: [phone]  Dept Fax: [phone]  Loc: [phone]    Visit Date: 2/7/2023    Ori Powell is a 82 y.o. female who presents today for: Follow-up (Patient was referred by Dr. Ezio Steinberg in 2021 for 2 polyps that needed removed however patient had both knees replaced during that time and is now here to discuss surgery for the polyps, denies bleeding )      HPI:       Ori Powell is a 82 y.o. female who is actually known to me for a prior visit in late 2021 for endoscopically unresectable polyp in the descending colon. I had actually seen her and discussed laparoscopic assisted colonoscopy as the next step in her work-up and treatment plan, however she was last to follow-up due to needing to have both of her knees replaced. She is not ready for surgery. She denies any bleeding but did note some tissue per rectum once over the past couple weeks. She is on Eliquis for DVT. Past Medical History:   Diagnosis Date    Arthritis     Cancer ([facility] [address]     CKD (chronic kidney disease), stage III ([facility] [address] 4/9/2019    Colon polyps 4/09    Diverticulosis 2/15    CT finding    Diverticulosis of sigmoid     CT 4/11    Elevated cholesterol with high triglycerides     Essential hypertension, benign     Family history of colon cancer     Fibrocystic disease of breast     Hyperlipidemia     Rosacea     Spinal stenosis lumbar region      Past Surgical History:   Procedure Laterality Date    BLEPHAROPLASTY Bilateral 1/11; 3/12    Nerad    CHOLECYSTECTOMY, LAPAROSCOPIC  01/07/2016    Dr Rehan Martin: BN    COLONOSCOPY  4/09,6/2011; 6/18/14    polyps, repeat 3 years;  Tata Ralphs  09/27/2021    Dr. Ezio Steinberg: Multiple polyps, extensive diverticulosis and 1 year surveillance    MOHS SURGERY Left 09/2017    Nodular Basal Cell L temple:       Current Outpatient Medications: Omega-3 1000 MG CAPS, Take 1 capsule by mouth daily, Disp: , Rfl:     vitamin D (ERGOCALCIFEROL) 1.25 MG (69690 UT) CAPS capsule, Take 1 capsule by mouth once a week, Disp: 8 capsule, Rfl: 1    ELIQUIS 5 MG TABS tablet, TAKE ONE TABLET BY MOUTH TWICE DAILY, Disp: 74 tablet, Rfl: 5    omeprazole (PRILOSEC) 20 MG delayed release capsule, TAKE ONE Capsule BY MOUTH EVERY MORNING, Disp: 30 capsule, Rfl: 5    LESCOL XL 80 MG extended release tablet, TAKE ONE Tablet BY MOUTH EVERY DAY, Disp: 90 tablet, Rfl: 3    BENICAR 20 MG tablet, TAKE ONE Tablet BY MOUTH DAILY, Disp: 90 tablet, Rfl: 3    carboxymethylcellulose 1 % ophthalmic solution, 1 drop 3 times daily, Disp: , Rfl:     furosemide (LASIX) 20 MG tablet, TAKE ONE Tablet BY MOUTH AS NEEDED DAILY (Patient not taking: No sig reported), Disp: 15 tablet, Rfl: 0    Compression Stockings MISC, by Does not apply route 20-30 mm (Patient not taking: Reported on 2/7/2023), Disp: 1 each, Rfl: 0    hydrocortisone (ANUSOL-HC) 2.5 % CREA rectal cream, Apply as needed twice daily to anal area (Patient not taking: Reported on 2/7/2023), Disp: 30 g, Rfl: 1    hydrocortisone (WESTCORT) 0.2 % cream, apply topically TO affected AREAS TWICE DAILY (Patient not taking: Reported on 2/7/2023), Disp: 30 g, Rfl: 1    triamcinolone (KENALOG) 0.1 % cream, Apply topically 2 times daily. (Patient not taking: Reported on 2/7/2023), Disp: 30 g, Rfl: 0    Miconazole Nitrate Applicator (MONISTAT 7 COMBO PACK FELISHA) 100 & 2 MG-% (9GM) KIT, Apply daily (Patient not taking: Reported on 2/7/2023), Disp: 1 kit, Rfl: 0  No Known Allergies  Past Surgical History:   Procedure Laterality Date    BLEPHAROPLASTY Bilateral 1/11; 3/12    Nerad    CHOLECYSTECTOMY, LAPAROSCOPIC  01/07/2016    Dr Maryan Horowitz: KRISTA    COLONOSCOPY  4/09,6/2011; 6/18/14    polyps, repeat 3 years;  Chasity White    COLONOSCOPY  09/27/2021    Dr. Wells Flattersiomara: Multiple polyps, extensive diverticulosis and 1 year surveillance    [facility]2017 Nodular Basal Cell L temple:     Family History   Problem Relation Age of Onset    Heart Disease Mother     Heart Attack Mother     Heart Disease Father     Heart Attack Father     Heart Disease Brother     Cancer Brother 54        colon    Cancer Other 36        son: colon    Heart Attack Other 58        4 total last age 76       Social History:   Social History     Tobacco Use    Smoking status: Never    Smokeless tobacco: Never   Substance Use Topics    Alcohol use: No      Tobacco cessation counseling provided as appropriate. REVIEW OF SYSTEMS:    Pertinent positives and negatives are mentioned in the HPI. Otherwise, all other systems were reviewed and negative. Objective:     Physical Exam   /76   Pulse 72   Temp 97.6 °F (36.4 °C) (Infrared)   Resp 16   Ht 5' 2\" (1.575 m)   Wt 162 lb (73.5 kg)   LMP 08/18/1993 (Exact Date)   SpO2 100%   BMI 29.63 kg/m²   Constitutional: Appears well-developed and well-nourished. Grooming appropriate. No gross deformities. Body mass index is 29.63 kg/m². Eyes: No scleral icterus. Conjunctiva/lids normal. Vision intact grossly. Pupils equal/symmetric, reactive bilaterally. ENT: External ears/nose without defect, scars, or masses. Hearing grossly intact. No facial deformity. Lips normal, normal dentition. Neck: No masses. Trachea midline. No crepitus. Thyroid not enlarged. Cardiovascular: Normal rate. No peripheral edema. Abdominal aorta normal size to palpation. Pulmonary/Chest: Effort normal. No respiratory distress. No wheezes. No use of accessory muscles. Musculoskeletal: Normal range of motion of head/neck, without deformity, pain, or crepitus, with normal strength and tone. Normal gait. Nails without clubbing or cyanosis. Neurological: Alert and oriented to person, place, and time. No gross deficits. Sensation intact. Skin: Skin is dry. No rashes noted. No pallor. No induration of nodules. Psychiatric: Normal mood and affect.  Behavior normal. Oriented to person, place, and time. Judgment and insight reasonable. Abdominal/wound: Soft, nontender, previous lap lizzeth incisions noted     Labs reviewed: None     Imaging reviewed: None    Previous colonoscopy report reviewed from 2021. Coordination and discussion with Dr. Shadi El of GI    Assessment/Plan:       A/P:  Established problem(s): Endoscopically unresectable polyp of the descending colon  Additional workup/treatment planned: Laparoscopic assisted colonoscopy with polypectomy  Risk of complications/morbidity: High    I discussed again with Anette Crisostomo the finding from her colonoscopy in late 2021. I discussed my concern that this may have progressed to either a more advanced adenoma or even potentially malignancy. Regardless, neck step would be laparoscopic assisted colonoscopy with polypectomy. If I do find that the lesion is more advanced, then we may need to take biopsies and plan for surgical resection at a later date. This also may be a possibility if the lesion is not completely resected or if there is any findings of malignancy on the final pathology report. We will plan for laparoscopic assisted colonoscopy in the next 1 to 2 months. Discussed the risks of the surgery, including but not limited to bleeding, perforation, need for additional operations, damage to intra-abdominal structures. She understands she is at high risk for complications given her CKD, her age, and her anticoagulation. She will need to hold her anticoagulation in the perioperative period. Continue with current prescription medications    DISPOSITION: Lap assisted colonoscopy    My findings will be relayed to consulting practitioner or PCP via Epic note    Note completed using dictation software, please excuse any errors.     Electronically signed by Monica Florez MD on 2/7/2023 at 10:41 AM

## 2023-02-07 NOTE — LETTER
P - [facility] [address] [place] [address] [phone]  f [phone]    Tevin Carlson MD                        SURGERY ORDER   -- Time of order -- 23    10:45 AM    [facility]:   [facility] # _________________                                                                                    Scheduled By:____________                  Surgery Date & Time: 3/6/23 at D                                       Pt arrival: D                                                                                      Patient Name:  Rubén Fuentes     :  1940     PCP:  Michelle Wisdom MD      Home Ph:    [phone] (home)                                                     PROCEDURE: Laparoscopic assisted colonoscopy with polypectomy (need good scope with , various snares, trap, Santoro net, clips, Tylene Fraise, Endo RN if available) [id number]    DIAGNOSIS:  Descending colon polyp D12.4    Anesthesia: _General  + exparel TAP block  Time Needed:  45 minutes    Pt Position:  lithotomy    Ureteral Stents: no  Ostomy Marking: no          Outpatient __X__              Pre-Op clearance to be done by: _PCP____    Cardiac Clearance Done by: _none_______    Medications to be stopped 3 days before surgery: ____eliquis_____                                                                                                                                                                                                  Tevin Carlson MD  Insurance:                                ID #                                        Ph #     (secondary ?)       Date called ______        Marivel Sor to: _____ @ _____           Precert Needed?  Yes  /  No    PreAut # & Details _______________________________________________________                        ______ Venida First to Amy 2 Verification _[phone]_      Post Op_________              ____Inst given                 _____ Lucy Florentino to Pt/Spouse                          COLON SURGERY CHECKLIST    -- Pre-op clearance: PCP  -- Anticoagulation/lovenox, ASA, plavix, etc  -- Surgery order faxed, date/time obtained, placed on calendar  -- Prep: C-scope prep  -- Phone call day before procedure to confirm  -- Post op appt: 2 weeks  -- Other: surgery in next 1-2 months

## 2023-02-07 NOTE — TELEPHONE ENCOUNTER
Patient called in wanting to know if she needs to take an antibiotic prior to having her colonoscopy done    Patient states that she has had two knee replacements     Please call the patient at [phone]

## 2023-02-07 NOTE — TELEPHONE ENCOUNTER
Patient has been scheduled for:    Procedure: Lap assisted colonoscopy   Date: 3/6/23  Time: TBD  Arrival: TBD  Hospital: [facility]     Covid:  ASA?: Eliquis 3 days (knows to talk with ordering physician)  Prep? Golytely, discussed in office, mailed all instructions    Pre-op? pcp    Post-op Appt? 3/21/23 at 10:45AM    Patient advised they will need a . Orders routed to surgery scheduling. Instructions have been mailed/emailed to:   Mailed to patient's home      Golytely routed to [facility] for approval

## 2023-02-13 ENCOUNTER — TELEPHONE (OUTPATIENT)
Dept: FAMILY MEDICINE CLINIC | Age: 83
End: 2023-02-13

## 2023-02-13 NOTE — TELEPHONE ENCOUNTER
----- Message from [facility] sent at 2/13/2023  8:50 AM EST -----  Subject: Appointment Request    Reason for Call: Established Patient Appointment needed: Routine Pre-Op    QUESTIONS    Reason for appointment request? No appointments available during search     Additional Information for Provider?  Ilana Garrido is having a colonoscopy march 6th and needs a pre op physical within 30 days- next avail appt is march 21st- she only wants to see Dr Randi Medrano. also she was told they are taking her   off of ELIQUIS 3 days prior to the colonoscopy- please give her a call.   ---------------------------------------------------------------------------  --------------  Bobbi DOBBINS  [phone]; OK to leave message on voicemail  ---------------------------------------------------------------------------  --------------  SCRIPT ANSWERS  COVID Screen: [place]

## 2023-02-15 DIAGNOSIS — K21.9 GASTROESOPHAGEAL REFLUX DISEASE: ICD-10-CM

## 2023-02-15 RX ORDER — OMEPRAZOLE 20 MG/1
CAPSULE, DELAYED RELEASE ORAL
Qty: 30 CAPSULE | Refills: 5 | Status: SHIPPED | OUTPATIENT
Start: 2023-02-15

## 2023-02-15 NOTE — TELEPHONE ENCOUNTER
Medication:   Requested Prescriptions     Pending Prescriptions Disp Refills    omeprazole (PRILOSEC) 20 MG delayed release capsule [Pharmacy Med Name: omeprazole 20 mg capsule,delayed release] 30 capsule 5     Sig: TAKE ONE Capsule BY MOUTH EVERY MORNING        Last Filled:  8/5/2022, 30, 5    Patient Phone Number: [phone] (home)     Last appt: 10/18/2022   Next appt: 2/24/2023    Last OARRS: No flowsheet data found.

## 2023-02-20 DIAGNOSIS — L30.9 DERMATITIS: ICD-10-CM

## 2023-02-20 RX ORDER — TRIAMCINOLONE ACETONIDE 1 MG/G
CREAM TOPICAL
Qty: 30 G | Refills: 2 | Status: SHIPPED | OUTPATIENT
Start: 2023-02-20

## 2023-02-22 ENCOUNTER — OFFICE VISIT (OUTPATIENT)
Dept: SURGERY | Age: 83
End: 2023-02-22
Payer: MEDICARE

## 2023-02-22 VITALS
RESPIRATION RATE: 16 BRPM | WEIGHT: 161 LBS | BODY MASS INDEX: 29.63 KG/M2 | TEMPERATURE: 97.9 F | DIASTOLIC BLOOD PRESSURE: 80 MMHG | HEART RATE: 65 BPM | HEIGHT: 62 IN | SYSTOLIC BLOOD PRESSURE: 140 MMHG

## 2023-02-22 DIAGNOSIS — I82.532 CHRONIC DEEP VEIN THROMBOSIS (DVT) OF POPLITEAL VEIN OF LEFT LOWER EXTREMITY (HCC): ICD-10-CM

## 2023-02-22 DIAGNOSIS — N18.31 STAGE 3A CHRONIC KIDNEY DISEASE (HCC): ICD-10-CM

## 2023-02-22 DIAGNOSIS — D12.4 ADENOMATOUS POLYP OF DESCENDING COLON: Primary | ICD-10-CM

## 2023-02-22 PROCEDURE — 99213 OFFICE O/P EST LOW 20 MIN: CPT | Performed by: SURGERY

## 2023-02-22 PROCEDURE — 1123F ACP DISCUSS/DSCN MKR DOCD: CPT | Performed by: SURGERY

## 2023-02-22 PROCEDURE — 3079F DIAST BP 80-89 MM HG: CPT | Performed by: SURGERY

## 2023-02-22 PROCEDURE — 3077F SYST BP >= 140 MM HG: CPT | Performed by: SURGERY

## 2023-02-22 NOTE — PROGRESS NOTES
[facility]  [address]  Dept: [phone]  Dept Fax: [phone]  Loc: [phone]    Visit Date: 2/22/2023    Leena Candelaria is a 82 y.o. female who presents today for: Follow-up (Patient is here discuss upcoming surgery-Laparoscopic assisted colonoscopy with polypectomy )      HPI:       Leena Candelaria is a 82 y.o. female who is scheduled for laparoscopic assisted colonoscopy polypectomy in the coming weeks but wanted to come back to speak with me and have additional questions answered. She is accompanied by her  today in the office. She tells me she is a little bit nervous about the procedure. Past Medical History:   Diagnosis Date    Arthritis     Cancer ([facility] [address]     CKD (chronic kidney disease), stage III ([facility] [address] 4/9/2019    Colon polyps 4/09    Diverticulosis 2/15    CT finding    Diverticulosis of sigmoid     CT 4/11    Elevated cholesterol with high triglycerides     Essential hypertension, benign     Family history of colon cancer     Fibrocystic disease of breast     Hyperlipidemia     Rosacea     Spinal stenosis lumbar region      Past Surgical History:   Procedure Laterality Date    BLEPHAROPLASTY Bilateral 1/11; 3/12    Nerad    CHOLECYSTECTOMY, LAPAROSCOPIC  01/07/2016    Dr Joseluis Duke: BN    COLONOSCOPY  4/09,6/2011; 6/18/14    polyps, repeat 3 years;  Georgeanna Clear  09/27/2021    Dr. Hilario De: Multiple polyps, extensive diverticulosis and 1 year surveillance    MOHS SURGERY Left 09/2017    Nodular Basal Cell L temple:       Current Outpatient Medications:     triamcinolone (KENALOG) 0.1 % cream, APPLY to the skin (topically) TWICE DAILY, Disp: 30 g, Rfl: 2    omeprazole (PRILOSEC) 20 MG delayed release capsule, TAKE ONE Capsule BY MOUTH EVERY MORNING (Patient not taking: Reported on 2/22/2023), Disp: 30 capsule, Rfl: 5    Omega-3 1000 MG CAPS, Take 1 capsule by mouth daily, Disp: , Rfl:     ELIQUIS 5 MG TABS tablet, TAKE ONE TABLET BY MOUTH TWICE DAILY, Disp: 74 tablet, Rfl: 5    hydrocortisone (ANUSOL-HC) 2.5 % CREA rectal cream, Apply as needed twice daily to anal area, Disp: 30 g, Rfl: 1    LESCOL XL 80 MG extended release tablet, TAKE ONE Tablet BY MOUTH EVERY DAY, Disp: 90 tablet, Rfl: 3    BENICAR 20 MG tablet, TAKE ONE Tablet BY MOUTH DAILY, Disp: 90 tablet, Rfl: 3    carboxymethylcellulose 1 % ophthalmic solution, 1 drop 3 times daily, Disp: , Rfl:     polyethylene glycol-electrolytes (NULYTELY) 420 g solution, TAKE AS DIRECTED DAY PRIOR TO COLONOSCOPY (Patient not taking: Reported on 2/22/2023), Disp: 1 each, Rfl: 0    vitamin D (ERGOCALCIFEROL) 1.25 MG (14052 UT) CAPS capsule, Take 1 capsule by mouth once a week (Patient not taking: Reported on 2/22/2023), Disp: 8 capsule, Rfl: 1    furosemide (LASIX) 20 MG tablet, TAKE ONE Tablet BY MOUTH AS NEEDED DAILY (Patient not taking: No sig reported), Disp: 15 tablet, Rfl: 0    Compression Stockings MISC, by Does not apply route 20-30 mm (Patient not taking: No sig reported), Disp: 1 each, Rfl: 0    hydrocortisone (WESTCORT) 0.2 % cream, apply topically TO affected AREAS TWICE DAILY (Patient not taking: No sig reported), Disp: 30 g, Rfl: 1    Miconazole Nitrate Applicator (MONISTAT 7 COMBO PACK FELISHA) 100 & 2 MG-% (9GM) KIT, Apply daily (Patient not taking: No sig reported), Disp: 1 kit, Rfl: 0  No Known Allergies  Past Surgical History:   Procedure Laterality Date    BLEPHAROPLASTY Bilateral 1/11; 3/12    Nerad    CHOLECYSTECTOMY, LAPAROSCOPIC  01/07/2016    Dr Tanisha Osullivan: BN    COLONOSCOPY  4/09,6/2011; 6/18/14    polyps, repeat 3 years;  Benjaman Le  09/27/2021    Dr. Africa Hernandez: Multiple polyps, extensive diverticulosis and 1 year surveillance    MOHS SURGERY Left 09/2017    Nodular Basal Cell L temple:     Family History   Problem Relation Age of Onset    Heart Disease Mother     Heart Attack Mother     Heart Disease Father     Heart Attack Father     Heart Disease Brother     Cancer Brother 54        colon    Cancer Other 36        son: colon    Heart Attack Other 58        4 total last age 76       Social History:   Social History     Tobacco Use    Smoking status: Never    Smokeless tobacco: Never   Substance Use Topics    Alcohol use: No      Tobacco cessation counseling provided as appropriate. REVIEW OF SYSTEMS:    Pertinent positives and negatives are mentioned in the HPI. Otherwise, all other systems were reviewed and negative. Objective:     Physical Exam   BP (!) 140/80 Comment: Patient states she is very nervous-declined recheck  Pulse 65   Temp 97.9 °F (36.6 °C) (Infrared)   Resp 16   Ht 5' 2\" (1.575 m)   Wt 161 lb (73 kg)   LMP 08/18/1993 (Exact Date)   BMI 29.45 kg/m²   Constitutional: Appears well-developed and well-nourished. Grooming appropriate. No gross deformities. Body mass index is 29.45 kg/m². Eyes: No scleral icterus. Conjunctiva/lids normal. Vision intact grossly. Pupils equal/symmetric, reactive bilaterally. ENT: External ears/nose without defect, scars, or masses. Hearing grossly intact. No facial deformity. Lips normal, normal dentition. Neck: No masses. Trachea midline. No crepitus. Thyroid not enlarged. Cardiovascular: Normal rate. No peripheral edema. Abdominal aorta normal size to palpation. Pulmonary/Chest: Effort normal. No respiratory distress. No wheezes. No use of accessory muscles. Musculoskeletal: Normal range of motion of head/neck, without deformity, pain, or crepitus, with normal strength and tone. Normal gait. Nails without clubbing or cyanosis. Neurological: Alert and oriented to person, place, and time. No gross deficits. Sensation intact. Skin: Skin is dry. No rashes noted. No pallor. No induration of nodules. Psychiatric: Normal mood and affect. Behavior normal. Oriented to person, place, and time. Judgment and insight reasonable.     Abdominal/wound: Soft, nontender, nondistended    Assessment/Plan:       A/P:  Established problem(s): Endoscopically unresectable polyp  Additional workup/treatment planned: Laparoscopic assisted colonoscopy with polypectomy  Risk of complications/morbidity: Moderate    I answered additional questions for Leon Sequeira and her  today in the office. We again discussed the procedure, including risks and expectations. She had some questions regarding the bowel prep as well, which I answered for her. We will plan for surgery in the coming weeks. DISPOSITION:  surgery soon    My findings will be relayed to consulting practitioner or PCP via Epic note    Note completed using dictation software, please excuse any errors.     Electronically signed by Gigi Tilley MD on 2/22/2023 at 2:22 PM

## 2023-02-24 ENCOUNTER — OFFICE VISIT (OUTPATIENT)
Dept: FAMILY MEDICINE CLINIC | Age: 83
End: 2023-02-24

## 2023-02-24 VITALS
WEIGHT: 165 LBS | HEIGHT: 62 IN | SYSTOLIC BLOOD PRESSURE: 128 MMHG | BODY MASS INDEX: 30.36 KG/M2 | DIASTOLIC BLOOD PRESSURE: 72 MMHG | HEART RATE: 79 BPM | OXYGEN SATURATION: 93 %

## 2023-02-24 DIAGNOSIS — I10 ESSENTIAL HYPERTENSION, BENIGN: ICD-10-CM

## 2023-02-24 DIAGNOSIS — K21.9 GASTROESOPHAGEAL REFLUX DISEASE, UNSPECIFIED WHETHER ESOPHAGITIS PRESENT: ICD-10-CM

## 2023-02-24 DIAGNOSIS — Z01.818 PRE-OP EXAM: ICD-10-CM

## 2023-02-24 DIAGNOSIS — I82.532 CHRONIC DEEP VEIN THROMBOSIS (DVT) OF POPLITEAL VEIN OF LEFT LOWER EXTREMITY (HCC): ICD-10-CM

## 2023-02-24 DIAGNOSIS — E78.2 MIXED HYPERLIPIDEMIA: ICD-10-CM

## 2023-02-24 DIAGNOSIS — K64.4 HEMORRHOIDAL SKIN TAGS: ICD-10-CM

## 2023-02-24 DIAGNOSIS — D12.3 ADENOMATOUS POLYP OF TRANSVERSE COLON: Primary | ICD-10-CM

## 2023-02-24 SDOH — ECONOMIC STABILITY: FOOD INSECURITY: WITHIN THE PAST 12 MONTHS, YOU WORRIED THAT YOUR FOOD WOULD RUN OUT BEFORE YOU GOT MONEY TO BUY MORE.: NEVER TRUE

## 2023-02-24 SDOH — ECONOMIC STABILITY: INCOME INSECURITY: HOW HARD IS IT FOR YOU TO PAY FOR THE VERY BASICS LIKE FOOD, HOUSING, MEDICAL CARE, AND HEATING?: NOT HARD AT ALL

## 2023-02-24 SDOH — ECONOMIC STABILITY: FOOD INSECURITY: WITHIN THE PAST 12 MONTHS, THE FOOD YOU BOUGHT JUST DIDN'T LAST AND YOU DIDN'T HAVE MONEY TO GET MORE.: NEVER TRUE

## 2023-02-24 SDOH — ECONOMIC STABILITY: HOUSING INSECURITY
IN THE LAST 12 MONTHS, WAS THERE A TIME WHEN YOU DID NOT HAVE A STEADY PLACE TO SLEEP OR SLEPT IN A SHELTER (INCLUDING NOW)?: NO

## 2023-02-24 ASSESSMENT — PATIENT HEALTH QUESTIONNAIRE - PHQ9
DEPRESSION UNABLE TO ASSESS: FUNCTIONAL CAPACITY MOTIVATION LIMITS ACCURACY
SUM OF ALL RESPONSES TO PHQ QUESTIONS 1-9: 0
1. LITTLE INTEREST OR PLEASURE IN DOING THINGS: 0
SUM OF ALL RESPONSES TO PHQ QUESTIONS 1-9: 0
SUM OF ALL RESPONSES TO PHQ9 QUESTIONS 1 & 2: 0
2. FEELING DOWN, DEPRESSED OR HOPELESS: 0
SUM OF ALL RESPONSES TO PHQ QUESTIONS 1-9: 0
SUM OF ALL RESPONSES TO PHQ QUESTIONS 1-9: 0

## 2023-02-24 NOTE — PROGRESS NOTES
Chief Complaint:     Jennifer Aguirre is a 82 y.o. female who presents for a preoperative physical examination. She is scheduled to have Laproscopic colonoscopy done by Dr. Trent Jenkins at [facility]. on 3/6/2023. History of Present Illness:      Sessile polyp that was unable to be removed with conventional colonoscopy. Asymptomatic    Past Medical History:   Diagnosis Date    Arthritis     Cancer ([facility] [address]     CKD (chronic kidney disease), stage III ([facility] [address] 4/9/2019    Colon polyps 4/09    Diverticulosis 2/15    CT finding    Diverticulosis of sigmoid     CT 4/11    Elevated cholesterol with high triglycerides     Essential hypertension, benign     Family history of colon cancer     Fibrocystic disease of breast     Hyperlipidemia     Rosacea     Spinal stenosis lumbar region         Review of patient's past surgical history indicates:     Past Surgical History:   Procedure Laterality Date    BLEPHAROPLASTY Bilateral 1/11; 3/12    Nerad    CHOLECYSTECTOMY, LAPAROSCOPIC  01/07/2016    Dr Britney Coy: BN    COLONOSCOPY  4/09,6/2011; 6/18/14    polyps, repeat 3 years;  Jett Im  09/27/2021    Dr. Lexii Garcia: Multiple polyps, extensive diverticulosis and 1 year surveillance    MOHS SURGERY Left 09/2017    Nodular Basal Cell L temple:                                                   Current Outpatient Medications   Medication Sig Dispense Refill    omeprazole (PRILOSEC) 20 MG delayed release capsule TAKE ONE Capsule BY MOUTH EVERY MORNING 30 capsule 5    polyethylene glycol-electrolytes (NULYTELY) 420 g solution TAKE AS DIRECTED DAY PRIOR TO COLONOSCOPY 1 each 0    Compression Stockings MISC by Does not apply route 20-30 mm 1 each 0    ELIQUIS 5 MG TABS tablet TAKE ONE TABLET BY MOUTH TWICE DAILY 74 tablet 5    hydrocortisone (ANUSOL-HC) 2.5 % CREA rectal cream Apply as needed twice daily to anal area 30 g 1    hydrocortisone (WESTCORT) 0.2 % cream apply topically TO affected AREAS TWICE DAILY 30 g 1    LESCOL XL 80 MG extended release tablet TAKE ONE Tablet BY MOUTH EVERY DAY 90 tablet 3    Miconazole Nitrate Applicator (MONISTAT 7 COMBO PACK FELISHA) 100 & 2 MG-% (9GM) KIT Apply daily 1 kit 0    BENICAR 20 MG tablet TAKE ONE Tablet BY MOUTH DAILY 90 tablet 3    carboxymethylcellulose 1 % ophthalmic solution 1 drop 3 times daily      triamcinolone (KENALOG) 0.1 % cream APPLY to the skin (topically) TWICE DAILY (Patient not taking: Reported on 2/24/2023) 30 g 2    Omega-3 1000 MG CAPS Take 1 capsule by mouth daily (Patient not taking: Reported on 2/24/2023)      vitamin D (ERGOCALCIFEROL) 1.25 MG (07520 UT) CAPS capsule Take 1 capsule by mouth once a week (Patient not taking: Reported on 2/24/2023) 8 capsule 1    furosemide (LASIX) 20 MG tablet TAKE ONE Tablet BY MOUTH AS NEEDED DAILY (Patient not taking: Reported on 2/24/2023) 15 tablet 0     No current facility-administered medications for this visit.        No Known Allergies    Social History     Tobacco Use    Smoking status: Never    Smokeless tobacco: Never   Substance Use Topics    Alcohol use: No    Drug use: No        Family History   Problem Relation Age of Onset    Heart Disease Mother     Heart Attack Mother     Heart Disease Father     Heart Attack Father     Heart Disease Brother     Cancer Brother 54        colon    Cancer Other 36        son: colon    Heart Attack Other 58        4 total last age 76        Review Of Systems  Skin: negative   Eyes: negative   Ears/Nose/Throat: negative  Respiratory: negative  Cardiovascular: negative  Gastrointestinal: per HPI  Genitourinary: negative  Musculoskeletal: negative  Neurologic: negative  Psychiatric: negative  Hematologic/Lymphatic/Immunologic: negative  Endocrine: negative    PHYSICAL EXAMINATION:  /72   Pulse 79   Ht 5' 2\" (1.575 m)   Wt 165 lb (74.8 kg)   LMP 08/18/1993 (Exact Date)   SpO2 93%   BMI 30.18 kg/m²   General appearance - alert, no distress  Skin - Skin color, texture, turgor normal. No rashes or lesions. Head - Normocephalic. No abnormalities  Eyes - per Opthalmologist  Ears - External ears normal. Canals clear. TM's normal.  Nose/Sinuses -  No drainage or sinus tenderness. Oropharynx - clear  Neck - Neck supple. No adenopathy or thyroid enlargement  Lungs - Lungs clear anterior and posterior  Heart - Regular rate and rhythm, with no rub, murmur or gallop noted. Abdomen - Abdomen soft, non-tender. BS normal. No masses, organomegaly  Extremities - No edema      ASSESSMENT:    Encounter Diagnoses   Name Primary? Adenomatous polyp of transverse colon Yes    Pre-op exam     Essential hypertension, benign     Chronic deep vein thrombosis (DVT) of popliteal vein of left lower extremity (HCC)     Hemorrhoidal skin tags     Gastroesophageal reflux disease, unspecified whether esophagitis present     Mixed hyperlipidemia       She is medically cleared for surgery and anesthesia. Plan:    Per operating surgeon.

## 2023-02-27 NOTE — PROGRESS NOTES
Place patient label inside box (if no patient label, complete below)  Name:  :  MR#:     PROCEDURAL INFORMED CONSENT FOR OPERATION / PROCEDURE  I (we)ETHEL NANCY C. authorize DR BALAJI KEYES  and/or such assistants as may be selected by him/her, to perform the following operation/procedure(s): LAPAROSCOPIC ASSISTED COLONOSCOPY WITH POLYPECTOMY       Note: If unable to obtain consent prior to an emergent procedure, document the emergent reason in the medical record.       This procedure has been explained to my (our) satisfaction and included in the explanation was:  The intended benefit, nature, and extent of the procedure to be performed;  The significant risks involved and the probability of success;  Alternative procedures and methods of treatment;  The dangers and probable consequences of such alternatives (including no procedure or treatment);  The expected consequences of the procedure on my future health;  Whether other qualified individuals would be performing important surgical tasks and/or whether  would be present to advise or support the procedure.    I (we) understand that there are other risks of infection and other serious complications in the pre-operative/procedural and postoperative/procedural stages of my (our) care.     I (we) have asked all of the questions which I (we) thought were important in deciding whether or not to undergo treatment or diagnosis. These questions have been answered to my (our) satisfaction.    I (we) understand that no assurance can be given that the procedure will be a success, and no guarantee or warranty of success has been given to me (us).    It has been explained to me (us) that during the course of the operation/procedure, unforeseen conditions may be revealed that necessitate extension of the original procedure(s) or different procedure(s) than those set forth in Paragraph 1. I (we) authorize and request that the above-named  physician, his/her assistants or his/her designees, perform procedures as necessary and desirable if deemed to be in my (our) best interest.     Revised 8/2/2021                                                                          Page 1 of 2           I acknowledge that health care personnel may be observing this procedure for the purpose of medical education or other specified purposes as may be necessary as requested and/or approved by my (our) physician. I (we) consent to the disposal by the hospital Pathologist of the removed tissue, parts or organs in accordance with hospital policy. I do ____ do not ____ consent to the use of a local infiltration pain blocking agent that will be used by my provider/surgical provider to help alleviate pain during my procedure. I do ____ do not ____ consent to an emergent blood transfusion in the case of a life-threatening situation that requires blood components to be administered. This consent is valid for 24 hours from the beginning of the procedure. This patient does ____ or does not ____ currently have a DNR status/order. If DNR order is in place, obtain Addendum to the Surgical Consent for ALL Patients with a DNR Order to address otilia-operative status for limited intervention or DNR suspension.      I have read and fully understand the above Consent for Operation/Procedure and that all blanks were completed before I signed the consent.   _____________________________       _____________________      ____/____am/pm  Signature of Patient or legal representative      Printed Name / Relationship            Date / Time   ____________________________       _____________________      ____/____am/pm  Witness to Signature                                    Printed Name                    Date / Time    If patient is unable to sign or is a minor, complete the following)  Patient is a minor, ____ years of age, or unable to sign because: ______________________________________________________________________________________________    If a phone consent is obtained, consent will be documented by using two health care professionals, each affirming that the consenting party has no questions and gives consent for the procedure discussed with the physician/provider.   _____________________          ____________________       _____/_____am/pm   2nd witness to phone consent        Printed name           Date / Time    Informed Consent:  I have provided the explanation described above in section 1 to the patient and/or legal representative.  I have provided the patient and/or legal representative with an opportunity to ask any questions about the proposed operation/procedure.   ___________________________          ____________________         ____/____am/pm  Provider / Proceduralist                            Printed name            Date / Time  Revised 8/2/2021                                                                      Page 2 of 2

## 2023-02-27 NOTE — PROGRESS NOTES
[facility] PRE-SURGICAL TESTING INSTRUCTIONS                      PRIOR TO PROCEDURE DATE:    1. PLEASE FOLLOW ANY INSTRUCTIONS GIVEN TO YOU PER YOUR SURGEON. 2. Arrange for someone to drive you home and be with you for the first 24 hours after discharge for your safety after your procedure for which you received sedation. Ensure it is someone we can share information with regarding your discharge. NOTE: At this time ONLY 2 ADULTS may accompany you   One person ENCOURAGED to stay at hospital entire time if outpatient surgery      3. You must contact your surgeon for instructions IF:  You are taking any blood thinners, aspirin, anti-inflammatory or vitamins. There is a change in your physical condition such as a cold, fever, rash, cuts, sores, or any other infection, especially near your surgical site. 4. Do not drink alcohol the day before or day of your procedure. Do not use any recreational marijuana at least 24 hours or street drugs (heroin, cocaine) at minimum 5 days prior to your procedure. 5. A Pre-Surgical History and Physical MUST be completed WITHIN 30 DAYS OR LESS prior to your procedure. by your Physician or an Urgent Care        THE DAY OF YOUR PROCEDURE:  1. Follow instructions for ARRIVAL TIME as DIRECTED BY YOUR SURGEON. 2. Enter the MAIN entrance from [address] and follow the signs to the free Parking [employer] or [employer] (offered free of charge 7 am-5pm). 3. Enter the Main Entrance of the hospital (do not enter from the lower level of the parking garage). Upon entrance, check in with the  at the surgical information desk on your LEFT. Bring your insurance card and photo ID to register      4. DO NOT EAT ANYTHING 8 hours prior to arrival for surgery. You may have up to 8 ounces of water 4 hours prior to your arrival for surgery.    NOTE: ALL Gastric, Bariatric & Bowel surgery patients - you MUST follow your surgeon's instructions regarding eating/ drinking as you will have very specific instructions to follow. If you did not receive these, call your surgeon's office immediately. 5. MEDICATIONS:  Take the following medications with a SMALL sip of water: OMEPRAZOLE  Use your usual dose of inhalers the morning of surgery. BRING your rescue inhaler with you to hospital.   Anesthesia does NOT want you to take insulin the morning of surgery. They will control your blood sugar while you are at the hospital. Please contact your ordering physician for instructions regarding your insulin the night before your procedure. If you have an insulin pump, please keep it set on basal rate. Bariatric patient's call your surgeon if on diabetic medications as some may need to be stopped 1 week prior to surgery    6. Do not swallow additional water when brushing teeth. No gum, candy, mints, or ice chips. Refrain from smoking or at least decrease the amount on day of surgery. 7. Morning of surgery:   Take a shower with an antibacterial soap (i.e., Safeguard or Dial) OR your physician may have instructed you to use Hibiclens. Dress in loose, comfortable clothing appropriate for redressing after your procedure. Do not wear jewelry (including body piercings), make-up (especially NO eye make-up), fingernail polish (NO toenail polish if foot/leg surgery), lotion, powders, or metal hairclips. Do not shave or wax for 72 hours prior to procedure near your operative site. Shaving with a razor can irritate your skin and make it easier to develop an infection. On the day of your procedure, any hair that needs to be removed near the surgical site will be 'clipped' by a healthcare worker using a special clipper designed to avoid skin irritation. 8. Dentures, glasses, or contacts will need to be removed before your procedure. Bring cases for your glasses, contacts, dentures, or hearing aids to protect them while you are in surgery.       9. If you use a CPAP, please bring it with you on the day of your procedure. 10. We recommend that valuable personal belongings such as cash, cell phones, e-tablets, or jewelry, be left at home during your stay. The hospital will not be responsible for valuables that are not secured in the hospital safe. However, if your insurance requires a co-pay, you may want to bring a method of payment, i.e., Check or credit card, if you wish to pay your co-pay the day of surgery. 11. If you are to stay overnight, you may bring a bag with personal items. Please have any large items you may need brought in by your family after your arrival to your hospital room. 12. If you have a Living Will or Durable Power of , please bring a copy on the day of your procedure. How we keep you safe and work to prevent surgical site infections:   1. Health care workers should always check your ID bracelet to verify your name and birth date. You will be asked many times to state your name, date of birth, and allergies. 2. Health care workers should always clean their hands with soap or alcohol gel before providing care to you. It is okay to ask anyone if they cleaned their hands before they touch you. 3. You will be actively involved in verifying the type of procedure you are having and ensuring the correct surgical site. This will be confirmed multiple times prior to your procedure. Do NOT marcel your surgery site UNLESS instructed to by your surgeon. 4. When you are in the operating room, your surgical site will be cleansed with a special soap, and in most cases, you will be given an antibiotic before the surgery begins. What to expect AFTER your procedure? 1. Immediately following your procedure, your will be taken to the PACU for the first phase of your recovery. Your nurse will help you recover from any potential side effects of anesthesia, such as extreme drowsiness, changes in your vital signs or breathing patterns.  Nausea, headache, muscle aches, or sore throat may also occur after anesthesia. Your nurse will help you manage these potential side effects. 2. For comfort and safety, arrange to have someone at home with you for the first 24 hours after discharge. 3. You and your family will be given written instructions about your diet, activity, dressing care, medications, and return visits. 4. Once at home, should issues with nausea, pain, or bleeding occur, or should you notice any signs of infection, you should call your surgeon. 5. Always clean your hands before and after caring for your wound. Do not let your family touch your surgery site without cleaning their hands. 6. Narcotic pain medications can cause significant constipation. You may want to add a stool softener to your postoperative medication schedule or speak to your surgeon on how best to manage this SIDE EFFECT. SPECIAL INSTRUCTIONS     Thank you for allowing us to care for you. We strive to exceed your expectations in the delivery of care and service provided to you and your family. If you need to contact the [place] [address] staff for any reason, please call us at [phone]    Instructions reviewed with patient during preadmission testing phone interview.   Lynette Goodson RN.2/27/2023 .3:04 PM      ADDITIONAL EDUCATIONAL INFORMATION REVIEWED PER PHONE WITH YOU AND/OR YOUR FAMILY:  No Hibiclens® Bathing Instructions   Yes Antibacterial Soap

## 2023-03-02 ENCOUNTER — TELEPHONE (OUTPATIENT)
Dept: SURGERY | Age: 83
End: 2023-03-02

## 2023-03-02 NOTE — TELEPHONE ENCOUNTER
I have placed a reminder call to patient for upcoming procedure. Did you speak directly to patient or leave a voicemail? Spoke to patient     Prep? NPO 12AM  Colonoscopy prep    Must have a  that is over the age of 25. Must be a friend or family member that can be responsible for signing them out after surgery.  ()    Arrive at the main entrance of [facility] at [facility]

## 2023-03-03 DIAGNOSIS — I10 ESSENTIAL HYPERTENSION, BENIGN: ICD-10-CM

## 2023-03-03 RX ORDER — OLMESARTAN MEDOXOMIL 20 MG/1
TABLET, FILM COATED ORAL
Qty: 90 TABLET | Refills: 3 | Status: SHIPPED | OUTPATIENT
Start: 2023-03-03

## 2023-03-03 NOTE — TELEPHONE ENCOUNTER
Medication:   Requested Prescriptions     Pending Prescriptions Disp Refills    BENICAR 20 MG tablet [Pharmacy Med Name: Benicar 20 mg tablet] 90 tablet 3     Sig: TAKE ONE Tablet BY MOUTH DAILY       Last Filled:  3/7/2022, 90, 3    Patient Phone Number: [phone] (home)     Last appt: 2/24/2023   Next appt: Visit date not found    Lab Results   Component Value Date     10/17/2022    K 4.1 10/17/2022     10/17/2022    CO2 26 10/17/2022    BUN 14 10/17/2022    CREATININE 1.09 10/17/2022    GLUCOSE 102 (H) 10/17/2022    CALCIUM 9.3 10/17/2022    PROT 6.2 02/15/2021    LABALBU 3.9 10/17/2022    BILITOT 0.5 02/15/2021    ALKPHOS 64 02/15/2021    AST 16 02/15/2021    ALT 10 02/15/2021    LABGLOM 50 (L) 10/22/2021    GFRAA 58 (L) 10/22/2021    AGRATIO 1.7 08/19/2020    GLOB 2.5 08/19/2020

## 2023-03-06 ENCOUNTER — ANESTHESIA EVENT (OUTPATIENT)
Dept: OPERATING ROOM | Age: 83
End: 2023-03-06
Payer: MEDICARE

## 2023-03-06 ENCOUNTER — HOSPITAL ENCOUNTER (OUTPATIENT)
Age: 83
Setting detail: OUTPATIENT SURGERY
Discharge: HOME | End: 2023-03-06
Attending: SURGERY | Admitting: SURGERY
Payer: MEDICARE

## 2023-03-06 ENCOUNTER — ANESTHESIA (OUTPATIENT)
Dept: OPERATING ROOM | Age: 83
End: 2023-03-06
Payer: MEDICARE

## 2023-03-06 VITALS
RESPIRATION RATE: 18 BRPM | SYSTOLIC BLOOD PRESSURE: 122 MMHG | TEMPERATURE: 97.1 F | OXYGEN SATURATION: 94 % | WEIGHT: 165 LBS | BODY MASS INDEX: 30.36 KG/M2 | DIASTOLIC BLOOD PRESSURE: 56 MMHG | HEIGHT: 62 IN | HEART RATE: 62 BPM

## 2023-03-06 DIAGNOSIS — D12.4 ADENOMA OF DESCENDING COLON: ICD-10-CM

## 2023-03-06 LAB
ABO/RH: NORMAL
ANION GAP SERPL CALCULATED.3IONS-SCNC: 11 MMOL/L (ref 3–16)
ANTIBODY SCREEN: NORMAL
BUN BLDV-MCNC: 13 MG/DL (ref 7–20)
CALCIUM SERPL-MCNC: 9 MG/DL (ref 8.3–10.6)
CHLORIDE BLD-SCNC: 106 MMOL/L (ref 99–110)
CO2: 23 MMOL/L (ref 21–32)
CREAT SERPL-MCNC: 1.1 MG/DL (ref 0.6–1.2)
GFR SERPL CREATININE-BSD FRML MDRD: 50 ML/MIN/{1.73_M2}
GLUCOSE BLD-MCNC: 106 MG/DL (ref 70–99)
HCT VFR BLD CALC: 34.2 % (ref 36–48)
HEMOGLOBIN: 11.5 G/DL (ref 12–16)
MCH RBC QN AUTO: 28.4 PG (ref 26–34)
MCHC RBC AUTO-ENTMCNC: 33.4 G/DL (ref 31–36)
MCV RBC AUTO: 85 FL (ref 80–100)
PDW BLD-RTO: 16.2 % (ref 12.4–15.4)
PLATELET # BLD: 213 K/UL (ref 135–450)
PMV BLD AUTO: 8.9 FL (ref 5–10.5)
POTASSIUM REFLEX MAGNESIUM: 3.8 MMOL/L (ref 3.5–5.1)
RBC # BLD: 4.03 M/UL (ref 4–5.2)
SODIUM BLD-SCNC: 140 MMOL/L (ref 136–145)
WBC # BLD: 5.4 K/UL (ref 4–11)

## 2023-03-06 PROCEDURE — 88305 TISSUE EXAM BY PATHOLOGIST: CPT

## 2023-03-06 PROCEDURE — 6360000002 HC RX W HCPCS: Performed by: SURGERY

## 2023-03-06 PROCEDURE — 3700000001 HC ADD 15 MINUTES (ANESTHESIA): Performed by: SURGERY

## 2023-03-06 PROCEDURE — 86901 BLOOD TYPING SEROLOGIC RH(D): CPT

## 2023-03-06 PROCEDURE — 7100000011 HC PHASE II RECOVERY - ADDTL 15 MIN: Performed by: SURGERY

## 2023-03-06 PROCEDURE — A4217 STERILE WATER/SALINE, 500 ML: HCPCS | Performed by: SURGERY

## 2023-03-06 PROCEDURE — 6360000002 HC RX W HCPCS: Performed by: NURSE ANESTHETIST, CERTIFIED REGISTERED

## 2023-03-06 PROCEDURE — 7100000001 HC PACU RECOVERY - ADDTL 15 MIN: Performed by: SURGERY

## 2023-03-06 PROCEDURE — C1889 IMPLANT/INSERT DEVICE, NOC: HCPCS | Performed by: SURGERY

## 2023-03-06 PROCEDURE — 2500000003 HC RX 250 WO HCPCS: Performed by: NURSE ANESTHETIST, CERTIFIED REGISTERED

## 2023-03-06 PROCEDURE — 85027 COMPLETE CBC AUTOMATED: CPT

## 2023-03-06 PROCEDURE — 86850 RBC ANTIBODY SCREEN: CPT

## 2023-03-06 PROCEDURE — 2500000003 HC RX 250 WO HCPCS: Performed by: SURGERY

## 2023-03-06 PROCEDURE — 3600000014 HC SURGERY LEVEL 4 ADDTL 15MIN: Performed by: SURGERY

## 2023-03-06 PROCEDURE — 6360000002 HC RX W HCPCS: Performed by: FAMILY MEDICINE

## 2023-03-06 PROCEDURE — 80048 BASIC METABOLIC PNL TOTAL CA: CPT

## 2023-03-06 PROCEDURE — 2709999900 HC NON-CHARGEABLE SUPPLY: Performed by: SURGERY

## 2023-03-06 PROCEDURE — 3700000000 HC ANESTHESIA ATTENDED CARE: Performed by: SURGERY

## 2023-03-06 PROCEDURE — 3600000004 HC SURGERY LEVEL 4 BASE: Performed by: SURGERY

## 2023-03-06 PROCEDURE — 7100000000 HC PACU RECOVERY - FIRST 15 MIN: Performed by: SURGERY

## 2023-03-06 PROCEDURE — 6360000002 HC RX W HCPCS: Performed by: STUDENT IN AN ORGANIZED HEALTH CARE EDUCATION/TRAINING PROGRAM

## 2023-03-06 PROCEDURE — 2580000003 HC RX 258: Performed by: SURGERY

## 2023-03-06 PROCEDURE — 7100000010 HC PHASE II RECOVERY - FIRST 15 MIN: Performed by: SURGERY

## 2023-03-06 PROCEDURE — 2580000003 HC RX 258: Performed by: FAMILY MEDICINE

## 2023-03-06 PROCEDURE — 86900 BLOOD TYPING SEROLOGIC ABO: CPT

## 2023-03-06 RX ORDER — ACETAMINOPHEN 325 MG/1
1000 TABLET ORAL ONCE
Status: DISCONTINUED | OUTPATIENT
Start: 2023-03-06 | End: 2023-03-06 | Stop reason: HOSPADM

## 2023-03-06 RX ORDER — FENTANYL CITRATE 50 UG/ML
25 INJECTION, SOLUTION INTRAMUSCULAR; INTRAVENOUS EVERY 5 MIN PRN
Status: COMPLETED | OUTPATIENT
Start: 2023-03-06 | End: 2023-03-06

## 2023-03-06 RX ORDER — DEXAMETHASONE SODIUM PHOSPHATE 4 MG/ML
INJECTION, SOLUTION INTRA-ARTICULAR; INTRALESIONAL; INTRAMUSCULAR; INTRAVENOUS; SOFT TISSUE PRN
Status: DISCONTINUED | OUTPATIENT
Start: 2023-03-06 | End: 2023-03-06 | Stop reason: SDUPTHER

## 2023-03-06 RX ORDER — SODIUM CHLORIDE 0.9 % (FLUSH) 0.9 %
5-40 SYRINGE (ML) INJECTION PRN
Status: DISCONTINUED | OUTPATIENT
Start: 2023-03-06 | End: 2023-03-06 | Stop reason: HOSPADM

## 2023-03-06 RX ORDER — MAGNESIUM HYDROXIDE 1200 MG/15ML
LIQUID ORAL CONTINUOUS PRN
Status: DISCONTINUED | OUTPATIENT
Start: 2023-03-06 | End: 2023-03-06 | Stop reason: HOSPADM

## 2023-03-06 RX ORDER — GLYCOPYRROLATE 0.2 MG/ML
INJECTION INTRAMUSCULAR; INTRAVENOUS PRN
Status: DISCONTINUED | OUTPATIENT
Start: 2023-03-06 | End: 2023-03-06 | Stop reason: SDUPTHER

## 2023-03-06 RX ORDER — LABETALOL HYDROCHLORIDE 5 MG/ML
10 INJECTION, SOLUTION INTRAVENOUS
Status: DISCONTINUED | OUTPATIENT
Start: 2023-03-06 | End: 2023-03-06 | Stop reason: HOSPADM

## 2023-03-06 RX ORDER — LEVOFLOXACIN 5 MG/ML
500 INJECTION, SOLUTION INTRAVENOUS
Status: COMPLETED | OUTPATIENT
Start: 2023-03-06 | End: 2023-03-06

## 2023-03-06 RX ORDER — ACETAMINOPHEN 650 MG/1
650 SUPPOSITORY RECTAL
Status: DISCONTINUED | OUTPATIENT
Start: 2023-03-06 | End: 2023-03-06 | Stop reason: HOSPADM

## 2023-03-06 RX ORDER — ROCURONIUM BROMIDE 10 MG/ML
INJECTION, SOLUTION INTRAVENOUS PRN
Status: DISCONTINUED | OUTPATIENT
Start: 2023-03-06 | End: 2023-03-06 | Stop reason: SDUPTHER

## 2023-03-06 RX ORDER — SODIUM CHLORIDE 0.9 % (FLUSH) 0.9 %
5-40 SYRINGE (ML) INJECTION EVERY 12 HOURS SCHEDULED
Status: DISCONTINUED | OUTPATIENT
Start: 2023-03-06 | End: 2023-03-06 | Stop reason: HOSPADM

## 2023-03-06 RX ORDER — HEPARIN SODIUM 5000 [USP'U]/ML
5000 INJECTION, SOLUTION INTRAVENOUS; SUBCUTANEOUS ONCE
Status: COMPLETED | OUTPATIENT
Start: 2023-03-06 | End: 2023-03-06

## 2023-03-06 RX ORDER — ONDANSETRON 2 MG/ML
INJECTION INTRAMUSCULAR; INTRAVENOUS PRN
Status: DISCONTINUED | OUTPATIENT
Start: 2023-03-06 | End: 2023-03-06 | Stop reason: SDUPTHER

## 2023-03-06 RX ORDER — SODIUM CHLORIDE, SODIUM LACTATE, POTASSIUM CHLORIDE, AND CALCIUM CHLORIDE .6; .31; .03; .02 G/100ML; G/100ML; G/100ML; G/100ML
IRRIGANT IRRIGATION PRN
Status: DISCONTINUED | OUTPATIENT
Start: 2023-03-06 | End: 2023-03-06 | Stop reason: HOSPADM

## 2023-03-06 RX ORDER — PROCHLORPERAZINE EDISYLATE 5 MG/ML
5 INJECTION INTRAMUSCULAR; INTRAVENOUS
Status: DISCONTINUED | OUTPATIENT
Start: 2023-03-06 | End: 2023-03-06 | Stop reason: HOSPADM

## 2023-03-06 RX ORDER — SODIUM CHLORIDE, SODIUM LACTATE, POTASSIUM CHLORIDE, CALCIUM CHLORIDE 600; 310; 30; 20 MG/100ML; MG/100ML; MG/100ML; MG/100ML
INJECTION, SOLUTION INTRAVENOUS CONTINUOUS
Status: DISCONTINUED | OUTPATIENT
Start: 2023-03-06 | End: 2023-03-06 | Stop reason: HOSPADM

## 2023-03-06 RX ORDER — ONDANSETRON 2 MG/ML
4 INJECTION INTRAMUSCULAR; INTRAVENOUS
Status: DISCONTINUED | OUTPATIENT
Start: 2023-03-06 | End: 2023-03-06 | Stop reason: HOSPADM

## 2023-03-06 RX ORDER — PROPOFOL 10 MG/ML
INJECTION, EMULSION INTRAVENOUS PRN
Status: DISCONTINUED | OUTPATIENT
Start: 2023-03-06 | End: 2023-03-06 | Stop reason: SDUPTHER

## 2023-03-06 RX ORDER — METRONIDAZOLE 500 MG/100ML
500 INJECTION, SOLUTION INTRAVENOUS
Status: COMPLETED | OUTPATIENT
Start: 2023-03-06 | End: 2023-03-06

## 2023-03-06 RX ORDER — LORAZEPAM 2 MG/ML
0.5 INJECTION INTRAMUSCULAR
Status: DISCONTINUED | OUTPATIENT
Start: 2023-03-06 | End: 2023-03-06 | Stop reason: HOSPADM

## 2023-03-06 RX ORDER — FENTANYL CITRATE 50 UG/ML
INJECTION, SOLUTION INTRAMUSCULAR; INTRAVENOUS PRN
Status: DISCONTINUED | OUTPATIENT
Start: 2023-03-06 | End: 2023-03-06 | Stop reason: SDUPTHER

## 2023-03-06 RX ORDER — SODIUM CHLORIDE 9 MG/ML
25 INJECTION, SOLUTION INTRAVENOUS PRN
Status: DISCONTINUED | OUTPATIENT
Start: 2023-03-06 | End: 2023-03-06 | Stop reason: HOSPADM

## 2023-03-06 RX ORDER — PHENYLEPHRINE HYDROCHLORIDE 10 MG/ML
INJECTION INTRAVENOUS PRN
Status: DISCONTINUED | OUTPATIENT
Start: 2023-03-06 | End: 2023-03-06 | Stop reason: SDUPTHER

## 2023-03-06 RX ORDER — IPRATROPIUM BROMIDE AND ALBUTEROL SULFATE 2.5; .5 MG/3ML; MG/3ML
1 SOLUTION RESPIRATORY (INHALATION)
Status: DISCONTINUED | OUTPATIENT
Start: 2023-03-06 | End: 2023-03-06 | Stop reason: HOSPADM

## 2023-03-06 RX ORDER — SODIUM CHLORIDE 9 MG/ML
INJECTION, SOLUTION INTRAVENOUS PRN
Status: DISCONTINUED | OUTPATIENT
Start: 2023-03-06 | End: 2023-03-06 | Stop reason: HOSPADM

## 2023-03-06 RX ORDER — LIDOCAINE HYDROCHLORIDE 20 MG/ML
INJECTION, SOLUTION INTRAVENOUS PRN
Status: DISCONTINUED | OUTPATIENT
Start: 2023-03-06 | End: 2023-03-06 | Stop reason: SDUPTHER

## 2023-03-06 RX ADMIN — GLYCOPYRROLATE 0.2 MG: 0.2 INJECTION INTRAMUSCULAR; INTRAVENOUS at 11:39

## 2023-03-06 RX ADMIN — FENTANYL CITRATE 50 MCG: 50 INJECTION, SOLUTION INTRAMUSCULAR; INTRAVENOUS at 11:52

## 2023-03-06 RX ADMIN — FENTANYL CITRATE 25 MCG: 50 INJECTION, SOLUTION INTRAMUSCULAR; INTRAVENOUS at 12:32

## 2023-03-06 RX ADMIN — SODIUM CHLORIDE, POTASSIUM CHLORIDE, SODIUM LACTATE AND CALCIUM CHLORIDE: 600; 310; 30; 20 INJECTION, SOLUTION INTRAVENOUS at 09:05

## 2023-03-06 RX ADMIN — LIDOCAINE HYDROCHLORIDE 100 MG: 20 INJECTION, SOLUTION INTRAVENOUS at 10:19

## 2023-03-06 RX ADMIN — PROPOFOL 80 MG: 10 INJECTION, EMULSION INTRAVENOUS at 10:19

## 2023-03-06 RX ADMIN — ONDANSETRON 4 MG: 2 INJECTION INTRAMUSCULAR; INTRAVENOUS at 11:57

## 2023-03-06 RX ADMIN — DEXAMETHASONE SODIUM PHOSPHATE 4 MG: 4 INJECTION, SOLUTION INTRAMUSCULAR; INTRAVENOUS at 11:57

## 2023-03-06 RX ADMIN — LEVOFLOXACIN 500 MG: 5 INJECTION, SOLUTION INTRAVENOUS at 10:47

## 2023-03-06 RX ADMIN — SUGAMMADEX 200 MG: 100 INJECTION, SOLUTION INTRAVENOUS at 11:58

## 2023-03-06 RX ADMIN — FENTANYL CITRATE 25 MCG: 50 INJECTION, SOLUTION INTRAMUSCULAR; INTRAVENOUS at 12:27

## 2023-03-06 RX ADMIN — HEPARIN SODIUM 5000 UNITS: 5000 INJECTION, SOLUTION INTRAVENOUS; SUBCUTANEOUS at 10:39

## 2023-03-06 RX ADMIN — METRONIDAZOLE 500 MG: 500 INJECTION, SOLUTION INTRAVENOUS at 10:41

## 2023-03-06 RX ADMIN — PHENYLEPHRINE HYDROCHLORIDE 100 MCG: 10 INJECTION INTRAVENOUS at 11:40

## 2023-03-06 RX ADMIN — FENTANYL CITRATE 25 MCG: 50 INJECTION, SOLUTION INTRAMUSCULAR; INTRAVENOUS at 13:30

## 2023-03-06 RX ADMIN — PROPOFOL 20 MG: 10 INJECTION, EMULSION INTRAVENOUS at 11:47

## 2023-03-06 RX ADMIN — ROCURONIUM BROMIDE 40 MG: 10 INJECTION INTRAVENOUS at 10:19

## 2023-03-06 RX ADMIN — FENTANYL CITRATE 50 MCG: 50 INJECTION, SOLUTION INTRAMUSCULAR; INTRAVENOUS at 10:07

## 2023-03-06 RX ADMIN — FENTANYL CITRATE 25 MCG: 50 INJECTION, SOLUTION INTRAMUSCULAR; INTRAVENOUS at 12:22

## 2023-03-06 RX ADMIN — PROPOFOL 10 MG: 10 INJECTION, EMULSION INTRAVENOUS at 11:50

## 2023-03-06 ASSESSMENT — PAIN SCALES - GENERAL
PAINLEVEL_OUTOF10: 0
PAINLEVEL_OUTOF10: 5
PAINLEVEL_OUTOF10: 3
PAINLEVEL_OUTOF10: 5
PAINLEVEL_OUTOF10: 6
PAINLEVEL_OUTOF10: 0
PAINLEVEL_OUTOF10: 6
PAINLEVEL_OUTOF10: 6

## 2023-03-06 ASSESSMENT — PAIN - FUNCTIONAL ASSESSMENT
PAIN_FUNCTIONAL_ASSESSMENT: ACTIVITIES ARE NOT PREVENTED
PAIN_FUNCTIONAL_ASSESSMENT: PREVENTS OR INTERFERES SOME ACTIVE ACTIVITIES AND ADLS
PAIN_FUNCTIONAL_ASSESSMENT: PREVENTS OR INTERFERES SOME ACTIVE ACTIVITIES AND ADLS

## 2023-03-06 ASSESSMENT — PAIN DESCRIPTION - PAIN TYPE
TYPE: SURGICAL PAIN

## 2023-03-06 ASSESSMENT — PAIN DESCRIPTION - LOCATION
LOCATION: ABDOMEN

## 2023-03-06 ASSESSMENT — PAIN DESCRIPTION - DESCRIPTORS
DESCRIPTORS: SORE
DESCRIPTORS: DISCOMFORT
DESCRIPTORS: SORE
DESCRIPTORS: DISCOMFORT
DESCRIPTORS: ACHING;DISCOMFORT

## 2023-03-06 ASSESSMENT — PAIN DESCRIPTION - ONSET
ONSET: ON-GOING

## 2023-03-06 ASSESSMENT — PAIN DESCRIPTION - ORIENTATION
ORIENTATION: MID
ORIENTATION: RIGHT
ORIENTATION: RIGHT

## 2023-03-06 ASSESSMENT — PAIN DESCRIPTION - FREQUENCY
FREQUENCY: CONTINUOUS

## 2023-03-06 NOTE — BRIEF OP NOTE
Brief Postoperative Note      Patient: Liv Brown  YOB: 1940  MRN: [id number]    Date of Procedure: 3/6/2023    Pre-Op Diagnosis: descending colon polyp    Post-Op Diagnosis:  transverse colon polyp       Procedure(s):  LAPAROSCOPIC ASSISTED COLONOSCOPY WITH POLYPECTOMY    Surgeon(s):  Randy Field MD    Assistant:  * No surgical staff found *    Anesthesia: General    Estimated Blood Loss (mL): Minimal    Complications: None    Specimens:   ID Type Source Tests Collected by Time Destination   A : A) POLYP Tissue Tissue SURGICAL PATHOLOGY Randy Field MD 3/6/2023 1150        Implants:  * No implants in log *      Drains: * No LDAs found *    Findings: Polyp in distal transverse colon, tattooed and new tattoo     Electronically signed by Compa Robertson MD on 3/6/2023 at 11:55 AM

## 2023-03-06 NOTE — PROGRESS NOTES
Procedure(s):  LAPAROSCOPIC ASSISTED COLONOSCOPY WITH POLYPECTOMY    Current Allergies: Patient has no known allergies. No results for input(s): POCGLU in the last 72 hours. Admitted to PACU bed 15 from OR. Arrived on a stretcher . Attached to PACU monitoring system. Alarms and parameters set. Report received from anesthesia personnel. OR staff did not report skin issues that were observed while in OR  Pt arrived with oxygen per nasal cannula with oxygen at 6 liters. Athrombic wraps in place.

## 2023-03-06 NOTE — PROGRESS NOTES
PACU Transfer to [facility] [place]    Procedure(s):  LAPAROSCOPIC ASSISTED COLONOSCOPY WITH POLYPECTOMY    Pt's Current Allergies: Patient has no known allergies. Pt meets criteria to transfer to next phase of care per Claybon Figueroa and PEREZ standards    No results for input(s): POCGLU in the last 72 hours. Vitals:    03/06/23 1345   BP: (!) 121/52   Pulse: 62   Resp: 16   Temp: 97.4 °F (36.3 °C)   SpO2: 98%      BP within 20% of pt's admitting BP as per Antwan Score      Intake/Output Summary (Last 24 hours) at 3/6/2023 1405  Last data filed at 3/6/2023 1316  Gross per 24 hour   Intake 640 ml   Output --   Net 640 ml       Pain assessment:  present - adequately treated  Pain Level: 5    Patient was assessed for unknown alterations to skin integrity. There were not unknown alterations observed. Patient transferred to care of Teodoro Solorio RN.    Family updated and directed to Teodoro Solorio    3/6/2023 2:05 PM

## 2023-03-06 NOTE — H&P
Update History & Physical    Patient has history of asymptomatic sessile polyp that was unable to be removed with conventional colonoscopy. She was previously referred in late 2021 by Dr. Lucy Porter but deferred surgery due to need for orthopedic procedures. Of note, patient takes eliquis for DVT. Last dose on this past Thursday. Prior laparoscopic cholecystectomy about 7 years ago. The patient's History and Physical of February 24, 2023 was reviewed with the patient and I examined the patient. There was no change. The surgical site was confirmed by the patient and me. Plan: The risks, benefits, expected outcome, and alternative to the recommended procedure have been discussed with the patient. Patient understands and wants to proceed with the procedure.      Electronically signed by Hari Cornell MD on 3/6/2023 at 7:15 AM

## 2023-03-06 NOTE — ANESTHESIA POSTPROCEDURE EVALUATION
Department of Anesthesiology  Postprocedure Note    Patient: Tami North  MRN: [id number]  YOB: 1940  Date of evaluation: 3/6/2023      Procedure Summary     Date: 03/06/23 Room / Location: [place] [address] / [facility]    Anesthesia Start: 1010 Anesthesia Stop: 1215    Procedure: LAPAROSCOPIC ASSISTED COLONOSCOPY WITH POLYPECTOMY (Abdomen) Diagnosis:       Adenoma of descending colon      (descending colon polyp)    Surgeons: Kirit Delong MD Responsible Provider: Albin Thompson MD    Anesthesia Type: general ASA Status: 2          Anesthesia Type: No value filed.    Antwan Phase I: Antwan Score: 8    Antwan Phase II:        Anesthesia Post Evaluation    Patient location during evaluation: PACU  Level of consciousness: awake  Complications: no  Multimodal analgesia pain management approach

## 2023-03-06 NOTE — PROGRESS NOTES
Ambulatory Surgery/Procedure Discharge Note    Vitals:    03/06/23 1410   BP: (!) 122/56   Pulse: 62   Resp: 18   Temp: 97.1 °F (36.2 °C)   SpO2: 94%     Patient meets criteria for discharge per Antwan score. In: 0 [P.O.:300; I.V.:400]  Out: -     Restroom use offered before discharge. Yes    Pain assessment:  present - adequately treated  Pain Level: 5    Pt and S.O./family states \"ready to go home\". Pt alert and oriented x4. IV removed. Patient c/o some nausea. States she wants to discharge home. Lap sites on abdomen-closed with surgical glue. No hematoma, bleeding, bruising, or swelling. o  Voided prior to discharge. Discharge instructions given to pt and  with pt permission. Pt and  verbalized understanding of all instructions. Left with all belongings and discharge instructions. Patient discharged to home/self care. Patient discharged via wheel chair by transporter to waiting family.         3/6/2023 3:04 PM

## 2023-03-06 NOTE — PROGRESS NOTES
Offered to call anesthesia MD to obtain oral narcotic medication for 5/10 discomfort in abdomen, but pt refuses. She states she wants to wait till she gets home to take her pain pill. She says her pain is at a tolerable level.

## 2023-03-06 NOTE — FLOWSHEET NOTE
Dr. Lesli Franklin came to bedside and said will reassess pt in 1 hour. Abd binder placed. Will continue to monitor.

## 2023-03-06 NOTE — ANESTHESIA PRE PROCEDURE
Department of Anesthesiology  Preprocedure Note       Name:  Margarito Mcbride   Age:  82 y.o.  :  1940                                          MRN:  [id number]         Date:  3/6/2023      Surgeon: Brandee Callejas):  Margie Wagner MD    Procedure: Procedure(s):  LAPAROSCOPIC ASSISTED COLONOSCOPY WITH POLYPECTOMY    Medications prior to admission:   Prior to Admission medications    Medication Sig Start Date End Date Taking?  Authorizing Provider   BENICAR 20 MG tablet TAKE ONE Tablet BY MOUTH DAILY 3/3/23   Mitesh Gonzalez MD   triamcinolone (KENALOG) 0.1 % cream APPLY to the skin (topically) TWICE DAILY  Patient not taking: Reported on 2023   Mitesh Gonzalez MD   omeprazole (PRILOSEC) 20 MG delayed release capsule TAKE ONE Capsule BY MOUTH EVERY MORNING 2/15/23   Mitesh Gonzalez MD   Omega-3 1000 MG CAPS Take 1 capsule by mouth daily  Patient not taking: No sig reported    Historical Provider, MD   vitamin D (ERGOCALCIFEROL) 1.25 MG (92339 UT) CAPS capsule Take 1 capsule by mouth once a week  Patient not taking: No sig reported 10/28/22   Laurita Arrow Josefina V, PA-C   furosemide (LASIX) 20 MG tablet TAKE ONE Tablet BY MOUTH AS NEEDED DAILY  Patient not taking: No sig reported 10/18/22   Mitesh Gonzalez MD   Compression Stockings MISC by Does not apply route 20-30 mm 10/18/22   Mitesh Gonzalez MD   ELIQUIS 5 MG TABS tablet TAKE ONE TABLET BY MOUTH TWICE DAILY 10/10/22   Mitesh Gonzalez MD   hydrocortisone (ANUSOL-HC) 2.5 % CREA rectal cream Apply as needed twice daily to anal area 10/7/22   Mitesh Gonzalez MD   hydrocortisone (WESTCORT) 0.2 % cream apply topically TO affected AREAS TWICE DAILY 22   Mitesh Gonzalez MD   LESCOL XL 80 MG extended release tablet TAKE ONE Tablet BY MOUTH EVERY DAY 22   Mitesh Gonzalez MD   Miconazole Nitrate Applicator (MONISTAT 7 COMBO PACK FELISHA) 100 & 2 MG-% (9GM) KIT Apply daily 3/31/22   Mitesh Gonzalez MD   carboxymethylcellulose 1 % ophthalmic solution 1 drop 3 times daily Historical Provider, MD       Current medications:    Current Facility-Administered Medications   Medication Dose Route Frequency Provider Last Rate Last Admin    sodium chloride flush 0.9 % injection 5-40 mL  5-40 mL IntraVENous 2 times per day Coty Grant MD        sodium chloride flush 0.9 % injection 5-40 mL  5-40 mL IntraVENous PRN Coty Grant MD        0.9 % sodium chloride infusion   IntraVENous PRN Coty Grant MD        acetaminophen (TYLENOL) tablet 975 mg  975 mg Oral Once Coty Grant MD        metronidazole (FLAGYL) 500 mg in 0.9% NaCl 100 mL IVPB premix  500 mg IntraVENous On Call to [address] Pos[address] MD        And    levoFLOXacin (LEVAQUIN) 500 MG/100ML infusion 500 mg  500 mg IntraVENous On Call to [address] Posadas MD           Allergies:  No Known Allergies    Problem List:    Patient Active Problem List   Diagnosis Code    Spinal stenosis M48.00    Diverticulosis of sigmoid colon K57.30    Family history of colon cancer Z80.0    Essential hypertension, benign I10    Hyperlipidemia E78.5    FH: heart disease Z82.49    Arthritis of knee M17.10    Diverticulosis K57.90    Mild concentric left ventricular hypertrophy (LVH) I51.7    Submandibular gland hypertrophy K11.1    CKD (chronic kidney disease), stage III (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 [address] [445401] N18.30       Past Medical History:        Diagnosis Date    Arthritis     Cancer ([facility] [address]     MELANOMA FACE    CKD (chronic kidney disease), stage III ([facility] [address] 04/09/2019    Colon polyps 04/2009    Diverticulosis 02/2015    CT finding    Diverticulosis of sigmoid     CT 4/11    Elevated cholesterol with high triglycerides     Essential hypertension, benign     Family history of colon cancer     Fibrocystic disease of breast     Hx of blood clots     Hyperlipidemia     Prolonged emergence from general anesthesia     Rosacea     Spinal stenosis lumbar region        Past Surgical History:        Procedure Laterality Date    BLEPHAROPLASTY Bilateral 1/11; 3/12    Nerad    CHOLECYSTECTOMY, LAPAROSCOPIC  01/07/2016    Dr Herminio Perera: BN    COLONOSCOPY  4/09,6/2011; 6/18/14    polyps, repeat 3 years; Edgardo Felty COLONOSCOPY  09/27/2021    Dr. Polina Hwang: Multiple polyps, extensive diverticulosis and 1 year surveillance    MOHS SURGERY Left 09/2017    Nodular Basal Cell L temple:       Social History:    Social History     Tobacco Use    Smoking status: Never    Smokeless tobacco: Never   Substance Use Topics    Alcohol use: No                                Counseling given: Not Answered      Vital Signs (Current):   Vitals:    02/27/23 1457 03/06/23 0812   BP:  (!) 166/71   Pulse:  69   Resp:  20   Temp:  97.2 °F (36.2 °C)   TempSrc:  Temporal   SpO2:  99%   Weight: 165 lb (74.8 kg) 165 lb (74.8 kg)   Height: 5' 2\" (1.575 m) 5' 2\" (1.575 m)                                              BP Readings from Last 3 Encounters:   03/06/23 (!) 166/71   02/24/23 128/72   02/22/23 (!) 140/80       NPO Status:                                                                                 BMI:   Wt Readings from Last 3 Encounters:   03/06/23 165 lb (74.8 kg)   02/24/23 165 lb (74.8 kg)   02/22/23 161 lb (73 kg)     Body mass index is 30.18 kg/m².     CBC:   Lab Results   Component Value Date/Time    WBC 6.3 10/17/2022 08:06 AM    RBC 3.71 10/17/2022 08:06 AM    HGB 10.9 10/17/2022 08:06 AM    HCT 32.9 10/17/2022 08:06 AM    MCV 88.6 10/17/2022 08:06 AM    RDW 14.5 10/17/2022 08:06 AM     10/17/2022 08:06 AM       CMP:   Lab Results   Component Value Date/Time     10/17/2022 08:06 AM    K 4.1 10/17/2022 08:06 AM     10/17/2022 08:06 AM    CO2 26 10/17/2022 08:06 AM    BUN 14 10/17/2022 08:06 AM    CREATININE 1.09 10/17/2022 08:06 AM    GFRAA 58 10/22/2021 10:07 AM    AGRATIO 1.7 08/19/2020 08:05 AM    LABGLOM 50 10/22/2021 10:07 AM    LABGLOM 52 09/26/2013 08:02 AM    GLUCOSE 102 10/17/2022 08:06 AM    GLUCOSE 108 09/02/2011 08:04 AM    PROT 6.2 02/15/2021 09:09 AM    PROT 6.7 09/17/2012 08:07 AM    CALCIUM 9.3 10/17/2022 08:06 AM    BILITOT 0.5 02/15/2021 09:09 AM    ALKPHOS 64 02/15/2021 09:09 AM    AST 16 02/15/2021 09:09 AM    ALT 10 02/15/2021 09:09 AM       POC Tests: No results for input(s): POCGLU, POCNA, POCK, POCCL, POCBUN, POCHEMO, POCHCT in the last 72 hours. Coags:   Lab Results   Component Value Date/Time    PROTIME 15.5 11/15/2021 12:00 AM    INR 1.3 11/15/2021 12:00 AM    APTT 30.9 06/16/2021 09:00 AM       HCG (If Applicable): No results found for: PREGTESTUR, PREGSERUM, HCG, HCGQUANT     ABGs: No results found for: PHART, PO2ART, NMQ1SVV, YLY0ZKZ, BEART, T9OZWMKK     Type & Screen (If Applicable):  No results found for: LABABO, LABRH    Drug/Infectious Status (If Applicable):  No results found for: HIV, HEPCAB    COVID-19 Screening (If Applicable): No results found for: COVID19        Anesthesia Evaluation    Airway: Mallampati: II  TM distance: >3 FB   Neck ROM: full  Mouth opening: > = 3 FB   Dental:          Pulmonary:                              Cardiovascular:    (+) hypertension:,         Rhythm: regular  Rate: normal                    Neuro/Psych:               GI/Hepatic/Renal:             Endo/Other:                     Abdominal:             Vascular: Other Findings:           Anesthesia Plan      general     ASA 2       Induction: intravenous.       Anesthetic plan and risks discussed with patient. Plan discussed with CRNA.                     Jl Rivas MD   3/6/2023

## 2023-03-06 NOTE — FLOWSHEET NOTE
Upon assessment Right lower incision appears edematous. Dr. Sb Angel called and said will come assess.

## 2023-03-06 NOTE — PROGRESS NOTES
Resident Dr Nice and Dr Hill came to bedside to evaluate pt. They state they are ok with the patient's incision site and the swelling underneath and surrounding the incision.     Abd binder remains in place with ice pack over top. Pt's pain is tolerable - spouse has been updated throughout her stay.

## 2023-03-06 NOTE — DISCHARGE INSTRUCTIONS
Discharge Instructions:    Diet:   You may resume a regular diet. Medications:  Okay to resume Eliquis on 3/9    Wound Care:   Skin glue was used to cover your incision(s). It will fall off on its own in about 10 days. You may shower, but do not scrub the incision sites directly or soak (tub, pool, etc.). Activity:   No heavy lifting greater than a milk jug until follow up. Pain management:   Unless informed of any restrictions by your primary care physician, please use your preferred over-the-counter pain reliever as your primary pain medication. Return Precautions:   Call/ Return to ED for increased redness, worsening pain, drainage from wound, fevers, or any other concerns about your incision or post op course. Follow up with Dr. Pradeep Raymond as needed. Please call [phone] to schedule an appointment. [address] [facility]    There are potential side effects of anesthesia or sedation you may experience for the first 24 hours. These side effects include:    Confusion or Memory loss, Dizziness, or Delayed Reaction Times   [x]A responsible person should be with you for the next 24 hours. Do not operate any vehicles (automobiles, bicycles, motorcycles) or power tools or machinery for 24 hours. Do not sign any legal documents or make any legal decisions for 24 hours. Do not drink alcohol for 24 hours or while taking narcotic pain medication. Nausea    [x]Start with light diet and progress to your normal diet as you feel like eating. However, if you experience nausea or repeated episodes of vomiting which persist beyond 12-24 hours, notify your physician. Once nausea has passed, remember to keep drinking fluids. Difficulty Passing Urine  [x]Drink extra amounts of fluid today. Notify your physician if you have not urinated within 8 hours after your procedure or you feel uncomfortable.       Irritated Throat from a Breathing Tube  [x]Drink extra amounts of fluid today. Lozenges may help. Muscle Aches  [x]You may experience some generalized body aches as your muscles recover from medications used to relax them during surgery. These will gradually subside. MEDICATION INSTRUCTIONS:  []Prescription(S) x  0   sent with you. Use as directed. When taking pain medications, you may experience the side effect of dizziness or drowsiness. Do not drink alcohol or drive when taking these medications. [x]Give the list of your medications to your primary care physician on your next visit. Keep your med list updated and carry it with in case of emergencies. [x] Narcotic pain medications can cause the side effect of significant constipation. You may want to add a stool softener to your postoperative medication schedule or speak to your surgeon on how best to manage this side effect. NARCOTIC SAFETY:  Your pain medicine is only for you to take. Safely store your medicines. Store pills up high and out of reach of children and pets. Ensure safety caps are snapped tightly  Keep track of how many pills you have left    Unused medication can be disposed of by taking them to a drop-off box or take-back program that is authorized by the [facility] [place]. Access to a site near you can be found on the [facility] [employer] website ([address] [facility] [place]). If you have a CPAP machine, it is very important that you use it daily during all periods of sleep and daytime rest during your recovery at home. Surgery and Anesthesia place a significant amount of stress on your body. Using your CPAP will help keep you safe and lessen the negative effects of that stress. Watch for these possible complications, symptoms, or side effects of anesthesia.   Call physician if they or any other problems occur:  Signs of INFECTION   > Fever over 101°     > Redness, swelling, hardness or warmth at the operative site   >Foul smelling or cloudy drainage at the operative site   Unrelieved PAIN  Unrelieved NAUSEA  Blood soaked dressing. (Some oozing may be normal)  Inability to urinate      Numb, pale, blue, cold or tingling extremity           PACU:  [phone]   M-F 700 AM - 7 PM      SAME DAY SERVICES:  [phone] M-F 7AM-6PM        If you smoke STOP. We care about your health!

## 2023-03-07 NOTE — OP NOTE
[address], [address]                                OPERATIVE REPORT    PATIENT NAME: Nancy Penaloza                 :        1940  MED REC NO:   [id number]                          ROOM:  ACCOUNT NO:   [de-identified]                           ADMIT DATE: 2023  PROVIDER:     Leonora Miles MD    DATE OF PROCEDURE:  2023    SURGEON:  Leonora Cortes. MD Sindi    PREOPERATIVE DIAGNOSIS:  Endoscopically unresectable polyp of the  descending colon. POSTOPERATIVE DIAGNOSES:  1. Endoscopically unresectable polyp of the transverse colon. 2.  Abdominal adhesions. PROCEDURES:  1. Laparoscopic adhesiolysis. 2.  Colonoscopy to cecum with snare polypectomy of transverse colon polyp. 3.  Colonoscopy to cecum with submucosal tattoo injection. 4.  Colonoscopy to cecum with clip placement. ANESTHESIA:  General endotracheal.    COMPLICATIONS:  None. SPECIMEN:  Transverse colon polyp. ESTIMATED BLOOD LOSS:  Minimal.    INDICATIONS:  The patient is an 82-year-old female. She underwent a  colonoscopy with Dr. Marti Cordero of . This was done in 2021. She was actually referred to me at that time and I recommended  laparoscopic-assisted colonoscopy versus partial colectomy. She decided  to delay her care due to orthopedic and other medical issues. She  eventually came back to see me in 2023. Again, I advised that we  proceed with laparoscopic-assisted colonoscopy with polypectomy. I also  discussed partial colectomy as an other option. I also offered to  repeat a diagnostic colonoscopy but she wanted to go ahead and proceed  with the surgery which I felt reasonable. She did get clearance from  her primary care as well as her cardiologist, especially, given her  multiple issues including her Eliquis use for DVT.   I discussed the  risks of procedure with her and her  including the risks of  bleeding, infection, perforation, inadequate resection, need for partial  colectomy, either immediate or delayed. Discussed the potential that  there may be malignancy that could require further workup including  staging and then a more formal resection. She understood all these  risks and agreed to proceed. DESCRIPTION OF PROCEDURE:  The patient was brought to the operating  theater, placed supine on the table. General endotracheal intubation  was performed by Anesthesiology. The patient was placed in lithotomy  position. Arriola catheter was placed. Her abdomen was prepped and  draped in usual fashion using chlorhexidine prep solution. Timeout was  performed confirming the patient's identity as well as the operative  site. Antibiotics were confirmed to be perfusing. All safety points  were followed. SCDs were on and functioning. Lovenox was confirmed  given. I began by making an incision in the right upper quadrant midclavicular  line. A 0-degree 5-mm laparoscope was used to enter the abdominal  cavity under direct Optiview fashion. The abdomen was entered without  complication and insufflated to 15 mmHg. Additional port was then  placed in the right lower quadrant. She was noted to have adhesions of  the omentum to the lower midline. I used a LigaSure device to take down  these adhesions successfully with minimal bleeding. I then placed the  patient in a slightly left-side up positioning. We noted the descending  colon appeared to be fairly normal.  I did not see any evidence of the  tattoo from the previous endoscopy. I then went below as the assistant stayed above and I passed the  colonoscope transanally under visualization. This was passed all the  way to the cecum. I verified the ileocecal valve and appendiceal  orifice. I then slowly withdrew. I noted the tattoo that was actually  in the proximal to mid transverse colon and not in the descending colon.   I then found the polyp which appeared to be fairly large and accompanied  probably about a third of the circumference and spread for at least 2 to  3 cm proximal to distal longitudinally. I attempted to place the snare  over the polyp but it was relatively sessile and would not come through  with one snare. At this point, we decided to go ahead and inject  additional tattoo distally to make sure that this was clearly visualized  on any repeat endoscopy versus surgical intervention. This was injected  submucosally by 1 mL in the spot in total.    I then used just the tip of the snare and a cut cautery to attempt to  marcel around the polyp and to enter the submucosal dissection plane. I  continued this plane as best as possible, but again, due to the  angulation of the colon and the difficulty with assistance  laparoscopically due to the omentum and the positioning of the patient  and positioning of the ports because of the fact that the polyp was in a  completely different location than originally documented, I was not able  to take out the polyp in one chunk even with some submucosal dissection. I ended up taking it up in several pieces using snare. There were some  areas where I could not get the snare around and these areas were then  cauterized. I made sure there was a trap and all the small pieces of  polyp were then removed and sent off labeled as transverse colon polyp  for permanent examination. I then thoroughly irrigated the area laparoscopically. A suction   was then used in the area with irrigation fluid. I noted no  evidence of bubbling or leaking from the colonic dissection. I  attempted endoscopic clip placement. I ended up placing about four  clips. Part of the wound did come back together, but due to the  fibrosis and the scar and the cautery and damage to the colon, the  mucosa would not come together completely. So, part of this was left  open. I ensured that there was good hemostasis.   The colonoscope was  then continued to be withdrawn to the remainder of the colon. No large  polyps or abnormalities were noted. We then inspected laparoscopically. We noted no other abnormalities grossly. The laparoscopic ports were  then removed under direct visualization. No bleeding noted in the  abdominal wall. The abdomen was desufflated and the incision was  irrigated and closed using 4-0 Monocryl and skin glue. The patient tolerated the procedure well. She was extubated and brought  to the PACU in stable condition. All counts were correct x2 at the end  of the procedure.         Ava King MD    D: 03/06/2023 12:12:04       T: 03/06/2023 22:35:40     LANETTE/ELENITA_DARREL_JOSE MIGUEL  Job#: [id number]     Doc#: [id number]    CC:

## 2023-03-09 NOTE — RESULT ENCOUNTER NOTE
Spoke to patient regarding pathology results. Discussed intraoperative findings with a large flat polyp that was mostly resected but also cauterized in some areas. High risk for recurrence given high-grade dysplasia, so I recommend repeat colonoscopy with Dr. Shantell Rodriguez in the next 3 to 6 months.

## 2023-03-10 ENCOUNTER — TELEPHONE (OUTPATIENT)
Dept: SURGERY | Age: 83
End: 2023-03-10

## 2023-03-10 NOTE — TELEPHONE ENCOUNTER
Patient called stating she has not had a bowel movement since surgery on 3/6/23. She would like to know if there is anything she can take.     Please call: [phone]

## 2023-03-21 ENCOUNTER — OFFICE VISIT (OUTPATIENT)
Dept: SURGERY | Age: 83
End: 2023-03-21

## 2023-03-21 VITALS
OXYGEN SATURATION: 97 % | SYSTOLIC BLOOD PRESSURE: 159 MMHG | HEIGHT: 62 IN | WEIGHT: 163 LBS | RESPIRATION RATE: 16 BRPM | DIASTOLIC BLOOD PRESSURE: 82 MMHG | TEMPERATURE: 97.7 F | HEART RATE: 69 BPM | BODY MASS INDEX: 30 KG/M2

## 2023-03-21 DIAGNOSIS — D12.4 ADENOMATOUS POLYP OF DESCENDING COLON: Primary | ICD-10-CM

## 2023-03-21 PROCEDURE — 99024 POSTOP FOLLOW-UP VISIT: CPT | Performed by: SURGERY

## 2023-03-21 RX ORDER — AMOXICILLIN 500 MG/1
500 CAPSULE ORAL PRN
COMMUNITY

## 2023-03-21 NOTE — PROGRESS NOTES
[facility]  [address]  Dept: [phone]  Dept Fax: [phone]  Loc: [phone]    Visit Date: 3/21/2023    Maria Dolores Smith is a 82 y.o. female who presents today for: Post-Op Check (Poat-op lap assisted c-scope 3/6/23-patient states she has had no post-op issues other than occasional discomfort )      Subjective:     Maria Dolores Smith is a 82 y.o. female here for postoperative visit after lap assisted colonoscopy polypectomy. Overall doing well. No complaints. Patient's problem list, medications, past medical, surgical, family, and social histories were reviewed and updated in the chart as indicated today. Objective:     BP (!) 159/82 Comment: will recheck after patient sees Dr. María Medley  Pulse 69   Temp 97.7 °F (36.5 °C) (Infrared)   Resp 16   Ht 5' 2\" (1.575 m)   Wt 163 lb (73.9 kg)   LMP 08/18/1993 (Exact Date)   SpO2 97%   BMI 29.81 kg/m²     Abdominal/wound: Wounds healing nicely, no signs of infection    Assessment/Plan:       ASSESSMENT/PLAN:    Status post lap assisted colonoscopy with partial polypectomy and ablation of polyp. I spent some time discussing the intraoperative findings. Unfortunately, given the location and technical limitations, I was unable to completely remove the polyp, so it was removed partially in piecemeal fashion, and the other parts were cauterized and fulgurated. Discussed pathology showing adenomatous tissue with focal high-grade dysplasia. I recommend short interval colonoscopy with Dr. Russell Quinones in 3 months to further evaluate. If the polyp has recurred or there is any other concerning findings, she will need partial colectomy. DISPOSITION:  f/u Dr Russell Quinones in 3 months for C_scope    Note completed using dictation software, please excuse any errors.     Referring/primary care physician updated through [facility] note if PCP was

## 2023-03-24 ENCOUNTER — TELEPHONE (OUTPATIENT)
Dept: SURGERY | Age: 83
End: 2023-03-24

## 2023-03-24 NOTE — TELEPHONE ENCOUNTER
Advised patient per Dr. Melissa Reyes, \"If recurs, she should contact Dr Sim Paul. \" Patient agreeable.

## 2023-03-24 NOTE — TELEPHONE ENCOUNTER
Patient called stating yesterday she had a bowel movement and there a little blood. Today when she had a bowel movement there  was al  lot more blood in the bowl. She would like to know if this is to be expected or if she needs to be seen in office.     Please call: [phone]

## 2023-03-28 ENCOUNTER — TELEPHONE (OUTPATIENT)
Dept: SURGERY | Age: 83
End: 2023-03-28

## 2023-03-28 NOTE — TELEPHONE ENCOUNTER
Patient called stating when she got out of her chair last night she had pain/soreness on the left of the incision. It travels over to her belly button. She has 325 mg Hydrocodone that she can take if needed.     Please call: [phone]

## 2023-03-28 NOTE — TELEPHONE ENCOUNTER
Spoke with patient, no signs of infection, wounds are healing great and she feels good. Discussed patient needs to take it easy, rest, let her body heal and get up slowly from her chair to help with the pulling sensation.

## 2023-03-30 ENCOUNTER — TELEPHONE (OUTPATIENT)
Dept: FAMILY MEDICINE CLINIC | Age: 83
End: 2023-03-30

## 2023-03-30 NOTE — TELEPHONE ENCOUNTER
Lt.foot and lt.leg is swollen  Patient has not been taking Lasix  Not hot to touch or pain  Started yesterday  Patient's medication is   Vinnie -2-24-23,Dr. Dolores Smith  Contact patient

## 2023-03-31 DIAGNOSIS — M79.89 LEG SWELLING: ICD-10-CM

## 2023-03-31 RX ORDER — FUROSEMIDE 20 MG/1
TABLET ORAL
Qty: 30 TABLET | Refills: 0 | Status: SHIPPED | OUTPATIENT
Start: 2023-03-31

## 2023-04-04 ENCOUNTER — TELEPHONE (OUTPATIENT)
Dept: ADMINISTRATIVE | Age: 83
End: 2023-04-04

## 2023-05-03 DIAGNOSIS — E78.5 HYPERLIPIDEMIA, UNSPECIFIED HYPERLIPIDEMIA TYPE: ICD-10-CM

## 2023-05-03 RX ORDER — FLUVASTATIN SODIUM 80 MG/1
TABLET, EXTENDED RELEASE ORAL
Qty: 90 TABLET | Refills: 3 | Status: SHIPPED | OUTPATIENT
Start: 2023-05-03

## 2023-05-03 NOTE — TELEPHONE ENCOUNTER
Medication:   Requested Prescriptions     Pending Prescriptions Disp Refills    LESCOL XL 80 MG extended release tablet [Pharmacy Med Name: Lescol XL 80 mg tablet,extended release] 90 tablet 3     Sig: TAKE ONE Tablet BY MOUTH EVERY DAY       Last Filled:  5/5/22 with 3 refills    Patient Phone Number: [phone] (home)     Last appt: 2/24/2023   Next appt: Visit date not found    Last Lipid:   Lab Results   Component Value Date/Time    CHOL 165 08/19/2020 08:05 AM    TRIG 105 08/19/2020 08:05 AM    HDL 62 08/19/2020 08:05 AM    LDLCALC 82 08/19/2020 08:05 AM

## 2023-05-10 DIAGNOSIS — K64.4 HEMORRHOIDAL SKIN TAGS: ICD-10-CM

## 2023-05-12 DIAGNOSIS — K64.4 HEMORRHOIDAL SKIN TAGS: ICD-10-CM

## 2023-05-12 RX ORDER — HYDROCORTISONE 25 MG/G
CREAM TOPICAL
Qty: 30 G | Refills: 1 | OUTPATIENT
Start: 2023-05-12

## 2023-05-12 RX ORDER — HYDROCORTISONE 25 MG/G
CREAM TOPICAL
Qty: 30 G | Refills: 1 | Status: SHIPPED | OUTPATIENT
Start: 2023-05-12

## 2023-05-12 NOTE — TELEPHONE ENCOUNTER
Medication and Quantity requested:    hydrocortisone (ANUSOL-HC) 2.5 % CREA rectal cream [[id number]]      Last Visit  02/24/2023      Pharmacy and phone number updated in EPIC:  yes    Kartik Pham

## 2023-05-12 NOTE — TELEPHONE ENCOUNTER
Medication:   Requested Prescriptions     Pending Prescriptions Disp Refills    hydrocortisone (ANUSOL-HC) 2.5 % CREA rectal cream 30 g 1     Sig: Apply as needed twice daily to anal area        Last Filled:  30 g x 1 RF 10/7/22    Patient Phone Number: [phone] (home)     Last appt: 2/24/2023   Next appt: Visit date not found    Last OARRS: No flowsheet data found.

## 2023-06-17 DIAGNOSIS — I82.452 DEEP VENOUS THROMBOSIS (DVT) OF LEFT PERONEAL VEIN, UNSPECIFIED CHRONICITY (HCC): ICD-10-CM

## 2023-06-19 RX ORDER — APIXABAN 5 MG/1
TABLET, FILM COATED ORAL
Qty: 74 TABLET | Refills: 2 | OUTPATIENT
Start: 2023-06-19

## 2023-07-05 ENCOUNTER — ANESTHESIA (OUTPATIENT)
Dept: ENDOSCOPY | Age: 83
End: 2023-07-05
Payer: MEDICARE

## 2023-07-05 ENCOUNTER — HOSPITAL ENCOUNTER (OUTPATIENT)
Age: 83
Setting detail: OUTPATIENT SURGERY
Discharge: HOME | End: 2023-07-05
Attending: INTERNAL MEDICINE | Admitting: INTERNAL MEDICINE
Payer: MEDICARE

## 2023-07-05 ENCOUNTER — ANESTHESIA EVENT (OUTPATIENT)
Dept: ENDOSCOPY | Age: 83
End: 2023-07-05
Payer: MEDICARE

## 2023-07-05 VITALS
RESPIRATION RATE: 18 BRPM | HEIGHT: 62 IN | BODY MASS INDEX: 30.36 KG/M2 | DIASTOLIC BLOOD PRESSURE: 50 MMHG | SYSTOLIC BLOOD PRESSURE: 105 MMHG | TEMPERATURE: 97.6 F | HEART RATE: 72 BPM | WEIGHT: 165 LBS | OXYGEN SATURATION: 100 %

## 2023-07-05 DIAGNOSIS — Z86.010 HISTORY OF ADENOMATOUS POLYP OF COLON: ICD-10-CM

## 2023-07-05 PROCEDURE — 3700000000 HC ANESTHESIA ATTENDED CARE: Performed by: INTERNAL MEDICINE

## 2023-07-05 PROCEDURE — 2709999900 HC NON-CHARGEABLE SUPPLY: Performed by: INTERNAL MEDICINE

## 2023-07-05 PROCEDURE — 3609010600 HC COLONOSCOPY POLYPECTOMY SNARE/COLD BIOPSY: Performed by: INTERNAL MEDICINE

## 2023-07-05 PROCEDURE — 7100000010 HC PHASE II RECOVERY - FIRST 15 MIN: Performed by: INTERNAL MEDICINE

## 2023-07-05 PROCEDURE — 2580000003 HC RX 258: Performed by: INTERNAL MEDICINE

## 2023-07-05 PROCEDURE — 3700000001 HC ADD 15 MINUTES (ANESTHESIA): Performed by: INTERNAL MEDICINE

## 2023-07-05 PROCEDURE — 3609010300 HC COLONOSCOPY W/BIOPSY SINGLE/MULTIPLE: Performed by: INTERNAL MEDICINE

## 2023-07-05 PROCEDURE — 88305 TISSUE EXAM BY PATHOLOGIST: CPT

## 2023-07-05 PROCEDURE — 6360000002 HC RX W HCPCS: Performed by: NURSE ANESTHETIST, CERTIFIED REGISTERED

## 2023-07-05 PROCEDURE — 7100000011 HC PHASE II RECOVERY - ADDTL 15 MIN: Performed by: INTERNAL MEDICINE

## 2023-07-05 PROCEDURE — 3609019800 HC COLONOSCOPY WITH SUBMUCOSAL INJECTION: Performed by: INTERNAL MEDICINE

## 2023-07-05 PROCEDURE — 2500000003 HC RX 250 WO HCPCS: Performed by: NURSE ANESTHETIST, CERTIFIED REGISTERED

## 2023-07-05 RX ORDER — SODIUM CHLORIDE 9 MG/ML
INJECTION, SOLUTION INTRAVENOUS CONTINUOUS
Status: DISCONTINUED | OUTPATIENT
Start: 2023-07-05 | End: 2023-07-05 | Stop reason: HOSPADM

## 2023-07-05 RX ORDER — PROPOFOL 10 MG/ML
INJECTION, EMULSION INTRAVENOUS PRN
Status: DISCONTINUED | OUTPATIENT
Start: 2023-07-05 | End: 2023-07-05 | Stop reason: SDUPTHER

## 2023-07-05 RX ORDER — LIDOCAINE HYDROCHLORIDE 20 MG/ML
INJECTION, SOLUTION EPIDURAL; INFILTRATION; INTRACAUDAL; PERINEURAL PRN
Status: DISCONTINUED | OUTPATIENT
Start: 2023-07-05 | End: 2023-07-05 | Stop reason: SDUPTHER

## 2023-07-05 RX ADMIN — PROPOFOL 25 MG: 10 INJECTION, EMULSION INTRAVENOUS at 11:36

## 2023-07-05 RX ADMIN — PROPOFOL 25 MG: 10 INJECTION, EMULSION INTRAVENOUS at 11:39

## 2023-07-05 RX ADMIN — PROPOFOL 25 MG: 10 INJECTION, EMULSION INTRAVENOUS at 11:31

## 2023-07-05 RX ADMIN — PROPOFOL 25 MG: 10 INJECTION, EMULSION INTRAVENOUS at 11:22

## 2023-07-05 RX ADMIN — PROPOFOL 25 MG: 10 INJECTION, EMULSION INTRAVENOUS at 11:44

## 2023-07-05 RX ADMIN — PROPOFOL 50 MG: 10 INJECTION, EMULSION INTRAVENOUS at 11:18

## 2023-07-05 RX ADMIN — SODIUM CHLORIDE: 9 INJECTION, SOLUTION INTRAVENOUS at 11:14

## 2023-07-05 RX ADMIN — PROPOFOL 25 MG: 10 INJECTION, EMULSION INTRAVENOUS at 11:27

## 2023-07-05 RX ADMIN — LIDOCAINE HYDROCHLORIDE 40 MG: 20 INJECTION, SOLUTION EPIDURAL; INFILTRATION; INTRACAUDAL; PERINEURAL at 11:18

## 2023-07-05 RX ADMIN — PROPOFOL 25 MG: 10 INJECTION, EMULSION INTRAVENOUS at 11:47

## 2023-07-05 RX ADMIN — PROPOFOL 25 MG: 10 INJECTION, EMULSION INTRAVENOUS at 11:49

## 2023-07-05 RX ADMIN — PROPOFOL 25 MG: 10 INJECTION, EMULSION INTRAVENOUS at 11:24

## 2023-07-05 RX ADMIN — PROPOFOL 25 MG: 10 INJECTION, EMULSION INTRAVENOUS at 11:20

## 2023-07-05 ASSESSMENT — PAIN - FUNCTIONAL ASSESSMENT: PAIN_FUNCTIONAL_ASSESSMENT: NONE - DENIES PAIN

## 2023-07-05 NOTE — ANESTHESIA POSTPROCEDURE EVALUATION
Department of Anesthesiology  Postprocedure Note    Patient: Cj Colon  MRN: [id number]  YOB: 1940  Date of evaluation: 7/5/2023      Procedure Summary     Date: 07/05/23 Room / Location: [address] [place] [address]    Anesthesia Start: 1115 Anesthesia Stop: 5968    Procedures:       COLONOSCOPY SUBMUCOSAL/BOTOX INJECTION      COLONOSCOPY POLYPECTOMY SNARE/COLD BIOPSY      COLONOSCOPY WITH BIOPSY Diagnosis:       History of adenomatous polyp of colon      (History of adenomatous polyp of colon [Z86.010])    Surgeons: Lisset Eller MD Responsible Provider:     Anesthesia Type: MAC ASA Status: 2          Anesthesia Type: No value filed.     Antwan Phase I: Antwan Score: 10    Antwan Phase II:        Anesthesia Post Evaluation    Patient location during evaluation: PACU  Patient participation: complete - patient participated  Level of consciousness: awake and awake and alert  Pain score: 2  Airway patency: patent  Nausea & Vomiting: no vomiting  Complications: no  Cardiovascular status: blood pressure returned to baseline  Respiratory status: acceptable  Hydration status: euvolemic  Multimodal analgesia pain management approach

## 2023-07-11 ENCOUNTER — OFFICE VISIT (OUTPATIENT)
Dept: SURGERY | Age: 83
End: 2023-07-11
Payer: MEDICARE

## 2023-07-11 VITALS
HEART RATE: 69 BPM | SYSTOLIC BLOOD PRESSURE: 154 MMHG | HEIGHT: 62 IN | DIASTOLIC BLOOD PRESSURE: 76 MMHG | WEIGHT: 163 LBS | TEMPERATURE: 97.3 F | RESPIRATION RATE: 16 BRPM | BODY MASS INDEX: 30 KG/M2 | OXYGEN SATURATION: 96 %

## 2023-07-11 DIAGNOSIS — D12.4 ADENOMATOUS POLYP OF DESCENDING COLON: Primary | ICD-10-CM

## 2023-07-11 DIAGNOSIS — I82.532 CHRONIC DEEP VEIN THROMBOSIS (DVT) OF POPLITEAL VEIN OF LEFT LOWER EXTREMITY (HCC): ICD-10-CM

## 2023-07-11 DIAGNOSIS — N18.31 STAGE 3A CHRONIC KIDNEY DISEASE (HCC): ICD-10-CM

## 2023-07-11 PROCEDURE — 1123F ACP DISCUSS/DSCN MKR DOCD: CPT | Performed by: SURGERY

## 2023-07-11 PROCEDURE — 3077F SYST BP >= 140 MM HG: CPT | Performed by: SURGERY

## 2023-07-11 PROCEDURE — 3078F DIAST BP <80 MM HG: CPT | Performed by: SURGERY

## 2023-07-11 PROCEDURE — 99215 OFFICE O/P EST HI 40 MIN: CPT | Performed by: SURGERY

## 2023-07-11 RX ORDER — SODIUM CHLORIDE 9 MG/ML
INJECTION, SOLUTION INTRAVENOUS PRN
OUTPATIENT
Start: 2023-07-11

## 2023-07-11 RX ORDER — SODIUM CHLORIDE 0.9 % (FLUSH) 0.9 %
5-40 SYRINGE (ML) INJECTION EVERY 12 HOURS SCHEDULED
OUTPATIENT
Start: 2023-07-11

## 2023-07-11 RX ORDER — ACETAMINOPHEN 325 MG/1
1000 TABLET ORAL ONCE
OUTPATIENT
Start: 2023-07-11 | End: 2023-07-11

## 2023-07-11 RX ORDER — SODIUM CHLORIDE 0.9 % (FLUSH) 0.9 %
5-40 SYRINGE (ML) INJECTION PRN
OUTPATIENT
Start: 2023-07-11

## 2023-07-11 RX ORDER — ENOXAPARIN SODIUM 100 MG/ML
40 INJECTION SUBCUTANEOUS ONCE
OUTPATIENT
Start: 2023-07-11 | End: 2023-07-11

## 2023-07-11 NOTE — PATIENT INSTRUCTIONS
office to schedule a postoperative appointment in about 2 weeks. During this appointment, Dr. Klaudia Hall will examine your wounds, and see how you are recovering from surgery. If you have any paperwork that needs to be filled out for work, please bring this to the office or fax this to the office. RISKS OF COLON SURGERY    Colon surgery is major surgery, and has risks. Even in healthy patients, complications can happen. Some of these risks can include (but are not limited to): bleeding, wound infections, deeper infections, hernias (especially with open surgery), scar tissue, missed lesions, unexpected findings, need to go back to the operating room or have another procedure, need for temporary or permanent stoma (see above), damage to other structures (such as intestine, blood vessels, other organs, and nerves), blood clots, pneumonia, heart attack, kidney failure, urinary infections, anastomotic leak, and even death. Anastomotic leak occurs if the bowel reconnection does not heal properly. Even in healthy patient with no medical problems, this can occur. Symptoms can include rectal bleeding, fevers, bloating, change in appetite, change in heart rate and generally not feeling well. Sometimes it can be difficult to diagnose a leak given the big overall with normal recovery and other problems that occur with surgery. Leaks occur most commonly within the first 1-2 weeks after surgery, but can be found much later as well. If a leak does happen, there is a wide range of possible effects, depending on the severity. With small/minor leaks, the leak may seal itself off, and some patients may not notice any changes. Some may have low grade fevers or change in appetite.  In some leaks, a fluid collection (abscess) may develop that can typically be treated with antibiotics and sometimes a drainage tube placed by a radiologist. Fluid collections can sometimes heal spontaneously, or sometimes develop into a long term fistula,

## 2023-07-17 NOTE — PROGRESS NOTES
[address] [facility] COLORECTAL SURGERY  [facility] [facility] [address]  Dept: [phone]  Dept Fax: [phone]  Loc: [phone]    Visit Date: 7/11/2023    Rosemarie Lane is a 82 y.o. female who presents today for: Follow-up (Major De Los Santos is here for follow up after having colonoscopy with Dr. Danita Claire at [facility] for possible surgical resection)      HPI:       Rosemarie Lane is a 82 y.o. female who is actually known to me for laparoscopic assisted colonoscopy with attempted polypectomy about 4 months ago. At that point I was unable to completely remove the polypoid lesion and she opted for continued colonoscopic surveillance. She recently underwent repeat colonoscopy with Dr. Danita Claire of GI who noted residual polypoid tissue, though biopsies revealed hyperplastic/inflammatory changes. She is here to discuss possible surgical intervention. 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 Danita Claire: Multiple polyps, extensive diverticulosis and 1 year surveillance    COLONOSCOPY N/A 3/6/2023    LAPAROSCOPIC Meds-to-Beds: Discharge prescription order listed below delivered to patient's bedside. CHARBEL Atkinson notified. Patient counseled. Patient elected to have co-payment billed to patient account.      Current Outpatient Medications   Medication Sig Dispense Refill    oxyCODONE immediate-release (ROXICODONE) 5 MG Tab Take 1 Tablet by mouth every four hours as needed for Severe Pain for up to 7 days. 30 Tablet 0      Amina Bettencourt, PharmD

## 2023-07-18 ENCOUNTER — TELEPHONE (OUTPATIENT)
Dept: SURGERY | Age: 83
End: 2023-07-18

## 2023-07-19 DIAGNOSIS — I82.452 DEEP VENOUS THROMBOSIS (DVT) OF LEFT PERONEAL VEIN, UNSPECIFIED CHRONICITY (HCC): ICD-10-CM

## 2023-07-20 ENCOUNTER — TELEPHONE (OUTPATIENT)
Dept: FAMILY MEDICINE CLINIC | Age: 83
End: 2023-07-20

## 2023-07-20 DIAGNOSIS — D12.4 ADENOMATOUS POLYP OF DESCENDING COLON: Primary | ICD-10-CM

## 2023-07-20 DIAGNOSIS — D12.3 ADENOMATOUS POLYP OF TRANSVERSE COLON: ICD-10-CM

## 2023-07-20 RX ORDER — METRONIDAZOLE 500 MG/1
TABLET ORAL
Qty: 3 TABLET | Refills: 0 | Status: SHIPPED | OUTPATIENT
Start: 2023-07-20

## 2023-07-20 RX ORDER — NEOMYCIN SULFATE 500 MG/1
TABLET ORAL
Qty: 6 TABLET | Refills: 0 | Status: SHIPPED | OUTPATIENT
Start: 2023-07-20

## 2023-07-20 RX ORDER — ONDANSETRON 4 MG/1
4 TABLET, ORALLY DISINTEGRATING ORAL 3 TIMES DAILY PRN
Qty: 3 TABLET | Refills: 0 | Status: SHIPPED | OUTPATIENT
Start: 2023-07-20

## 2023-07-20 RX ORDER — APIXABAN 5 MG/1
TABLET, FILM COATED ORAL
Qty: 60 TABLET | Refills: 0 | Status: SHIPPED | OUTPATIENT
Start: 2023-07-20

## 2023-07-20 NOTE — TELEPHONE ENCOUNTER
Patient has been scheduled for:    Procedure: Robo partial transverse colectomy  Date: 7/28/23  Time: 9:00am  Arrival: 7:00am  [facility]  [facility] Eliquis 3 days  Prep? Pre-op? pcp    Post-op Appt? 8/15/23 at 11:30am     Patient advised they will be admitted. Orders routed to surgery scheduling.     Instructions have been mailed/emailed to:    [facility] routed to  for approval.

## 2023-07-20 NOTE — TELEPHONE ENCOUNTER
Isabella Olivera is having surgery July 28th with Dr Roxana Garcia at [facility] (Removing polyp)    Pt only wants to see Dr Jenny Pennington for the Pre-Op. Call to schedule.

## 2023-07-20 NOTE — TELEPHONE ENCOUNTER
Medication:   Requested Prescriptions     Pending Prescriptions Disp Refills    ELIQUIS 5 MG TABS tablet [Pharmacy Med Name: Eliquis 5 mg tablet] 60 tablet 0     Sig: TAKE ONE Tablet BY MOUTH TWICE DAILY        Last Filled:  60 x 0 RF 6/20/23    Patient Phone Number: [phone] (home)     Last appt: 2/24/2023   Next appt: 7/20/2023    Last OARRS: No flowsheet data found.

## 2023-07-25 ENCOUNTER — OFFICE VISIT (OUTPATIENT)
Dept: FAMILY MEDICINE CLINIC | Age: 83
End: 2023-07-25

## 2023-07-25 ENCOUNTER — TELEPHONE (OUTPATIENT)
Dept: SURGERY | Age: 83
End: 2023-07-25

## 2023-07-25 ENCOUNTER — OFFICE VISIT (OUTPATIENT)
Dept: SURGERY | Age: 83
End: 2023-07-25
Payer: MEDICARE

## 2023-07-25 VITALS — WEIGHT: 164 LBS | HEIGHT: 62 IN | BODY MASS INDEX: 30.18 KG/M2

## 2023-07-25 VITALS
DIASTOLIC BLOOD PRESSURE: 76 MMHG | SYSTOLIC BLOOD PRESSURE: 138 MMHG | HEIGHT: 62 IN | OXYGEN SATURATION: 96 % | WEIGHT: 164 LBS | HEART RATE: 60 BPM | BODY MASS INDEX: 30.18 KG/M2

## 2023-07-25 DIAGNOSIS — D12.3 ADENOMATOUS POLYP OF TRANSVERSE COLON: Primary | ICD-10-CM

## 2023-07-25 DIAGNOSIS — I82.532 CHRONIC DEEP VEIN THROMBOSIS (DVT) OF POPLITEAL VEIN OF LEFT LOWER EXTREMITY (HCC): ICD-10-CM

## 2023-07-25 DIAGNOSIS — E78.2 MIXED HYPERLIPIDEMIA: ICD-10-CM

## 2023-07-25 DIAGNOSIS — K21.9 GASTROESOPHAGEAL REFLUX DISEASE, UNSPECIFIED WHETHER ESOPHAGITIS PRESENT: ICD-10-CM

## 2023-07-25 DIAGNOSIS — I10 ESSENTIAL HYPERTENSION, BENIGN: ICD-10-CM

## 2023-07-25 DIAGNOSIS — Z01.818 PRE-OP EXAM: ICD-10-CM

## 2023-07-25 DIAGNOSIS — D12.4 ADENOMATOUS POLYP OF DESCENDING COLON: Primary | ICD-10-CM

## 2023-07-25 PROCEDURE — 99213 OFFICE O/P EST LOW 20 MIN: CPT | Performed by: SURGERY

## 2023-07-25 PROCEDURE — 1124F ACP DISCUSS-NO DSCNMKR DOCD: CPT | Performed by: SURGERY

## 2023-07-25 NOTE — TELEPHONE ENCOUNTER
PT called to ask if she was okay to go to the dentist prior to her surgery on Friday? She's asking because she takes Amoxicillin prior to her dental appointments. Please call her at [phone].

## 2023-07-25 NOTE — PROGRESS NOTES
Chief Complaint:     Lilibeth Agarwal is a 82 y.o. female who presents for a preoperative physical examination. She is scheduled to have polyp removal done by Dr. Anabelle Castillo at [facility] on 7/28/2023. History of Present Illness:      Previously unresectable polyp per colonoscopy. 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 Nye Pay: Multiple polyps, extensive diverticulosis and 1 year surveillance    COLONOSCOPY N/A 3/6/2023    LAPAROSCOPIC ASSISTED COLONOSCOPY WITH POLYPECTOMY performed by Malik Stone MD at [facility] N/A 7/5/2023    COLONOSCOPY SUBMUCOSAL/BOTOX INJECTION performed by Reji Dobbs MD at [facility]  7/5/2023    COLONOSCOPY POLYPECTOMY SNARE/COLD BIOPSY performed by Reji Dobbs MD at [facility]  7/5/2023    COLONOSCOPY WITH BIOPSY performed by Reji Dobbs MD at [facility] [address] Bilateral     knee    MOHS SURGERY Left 09/2017    Nodular Basal Cell L temple:

## 2023-07-27 ENCOUNTER — TELEPHONE (OUTPATIENT)
Dept: SURGERY | Age: 83
End: 2023-07-27

## 2023-07-27 ENCOUNTER — ANESTHESIA EVENT (OUTPATIENT)
Dept: OPERATING ROOM | Age: 83
End: 2023-07-27
Payer: MEDICARE

## 2023-07-27 NOTE — TELEPHONE ENCOUNTER
I have placed a reminder call to patient for upcoming procedure. Did you speak directly to patient or leave a voicemail? Spoke to patient    Prep? Prep #2    Must have a  that is over the age of 25. Must be a friend or family member that can be responsible for signing them out after surgery.     Arrive at the main entrance [facility] at 10:45am

## 2023-07-28 ENCOUNTER — ANESTHESIA (OUTPATIENT)
Dept: OPERATING ROOM | Age: 83
End: 2023-07-28
Payer: MEDICARE

## 2023-07-28 ENCOUNTER — HOSPITAL ENCOUNTER (INPATIENT)
Age: 83
LOS: 2 days | Discharge: HOME | DRG: 331 | End: 2023-07-30
Attending: SURGERY | Admitting: SURGERY
Payer: MEDICARE

## 2023-07-28 DIAGNOSIS — D12.6 ADENOMATOUS POLYP OF COLON, UNSPECIFIED PART OF COLON: ICD-10-CM

## 2023-07-28 DIAGNOSIS — G89.18 POST-OP PAIN: Primary | ICD-10-CM

## 2023-07-28 PROBLEM — D12.3 ADENOMATOUS POLYP OF TRANSVERSE COLON: Status: ACTIVE | Noted: 2023-07-28

## 2023-07-28 LAB
ABO + RH BLD: NORMAL
ACANTHOCYTES BLD QL SMEAR: ABNORMAL
ALBUMIN SERPL-MCNC: 3.8 G/DL (ref 3.4–5)
ANION GAP SERPL CALCULATED.3IONS-SCNC: 16 MMOL/L (ref 3–16)
ANION GAP SERPL CALCULATED.3IONS-SCNC: 17 MMOL/L (ref 3–16)
BASOPHILS # BLD: 0 K/UL (ref 0–0.2)
BASOPHILS NFR BLD: 0 %
BLD GP AB SCN SERPL QL: NORMAL
BUN SERPL-MCNC: 14 MG/DL (ref 7–20)
BUN SERPL-MCNC: 15 MG/DL (ref 7–20)
CALCIUM SERPL-MCNC: 8.4 MG/DL (ref 8.3–10.6)
CALCIUM SERPL-MCNC: 8.9 MG/DL (ref 8.3–10.6)
CHLORIDE SERPL-SCNC: 101 MMOL/L (ref 99–110)
CHLORIDE SERPL-SCNC: 102 MMOL/L (ref 99–110)
CO2 SERPL-SCNC: 18 MMOL/L (ref 21–32)
CO2 SERPL-SCNC: 21 MMOL/L (ref 21–32)
CREAT SERPL-MCNC: 1.1 MG/DL (ref 0.6–1.2)
CREAT SERPL-MCNC: 1.3 MG/DL (ref 0.6–1.2)
DEPRECATED RDW RBC AUTO: 15.4 % (ref 12.4–15.4)
DEPRECATED RDW RBC AUTO: 15.5 % (ref 12.4–15.4)
EOSINOPHIL # BLD: 0 K/UL (ref 0–0.6)
EOSINOPHIL NFR BLD: 0 %
GFR SERPLBLD CREATININE-BSD FMLA CKD-EPI: 41 ML/MIN/{1.73_M2}
GFR SERPLBLD CREATININE-BSD FMLA CKD-EPI: 50 ML/MIN/{1.73_M2}
GLUCOSE SERPL-MCNC: 108 MG/DL (ref 70–99)
GLUCOSE SERPL-MCNC: 122 MG/DL (ref 70–99)
HCT VFR BLD AUTO: 33.3 % (ref 36–48)
HCT VFR BLD AUTO: 33.8 % (ref 36–48)
HGB BLD-MCNC: 11 G/DL (ref 12–16)
HGB BLD-MCNC: 11.3 G/DL (ref 12–16)
LACTATE BLDV-SCNC: 1.2 MMOL/L (ref 0.4–2)
LYMPHOCYTES # BLD: 0.4 K/UL (ref 1–5.1)
LYMPHOCYTES NFR BLD: 5 %
MAGNESIUM SERPL-MCNC: 1.8 MG/DL (ref 1.8–2.4)
MAGNESIUM SERPL-MCNC: 1.9 MG/DL (ref 1.8–2.4)
MCH RBC QN AUTO: 28.2 PG (ref 26–34)
MCH RBC QN AUTO: 28.5 PG (ref 26–34)
MCHC RBC AUTO-ENTMCNC: 32.6 G/DL (ref 31–36)
MCHC RBC AUTO-ENTMCNC: 33.9 G/DL (ref 31–36)
MCV RBC AUTO: 84.1 FL (ref 80–100)
MCV RBC AUTO: 86.5 FL (ref 80–100)
MONOCYTES # BLD: 0 K/UL (ref 0–1.3)
MONOCYTES NFR BLD: 0 %
NEUTROPHILS # BLD: 8.1 K/UL (ref 1.7–7.7)
NEUTROPHILS NFR BLD: 93 %
NEUTS BAND NFR BLD MANUAL: 2 % (ref 0–7)
OVALOCYTES BLD QL SMEAR: ABNORMAL
PHOSPHATE SERPL-MCNC: 3.4 MG/DL (ref 2.5–4.9)
PLATELET # BLD AUTO: 184 K/UL (ref 135–450)
PLATELET # BLD AUTO: 204 K/UL (ref 135–450)
PMV BLD AUTO: 9 FL (ref 5–10.5)
PMV BLD AUTO: 9 FL (ref 5–10.5)
POIKILOCYTOSIS BLD QL SMEAR: ABNORMAL
POLYCHROMASIA BLD QL SMEAR: ABNORMAL
POTASSIUM SERPL-SCNC: 2.8 MMOL/L (ref 3.5–5.1)
POTASSIUM SERPL-SCNC: 4.3 MMOL/L (ref 3.5–5.1)
RBC # BLD AUTO: 3.91 M/UL (ref 4–5.2)
RBC # BLD AUTO: 3.95 M/UL (ref 4–5.2)
SODIUM SERPL-SCNC: 136 MMOL/L (ref 136–145)
SODIUM SERPL-SCNC: 139 MMOL/L (ref 136–145)
WBC # BLD AUTO: 7.6 K/UL (ref 4–11)
WBC # BLD AUTO: 8.5 K/UL (ref 4–11)

## 2023-07-28 PROCEDURE — 8E0W4CZ ROBOTIC ASSISTED PROCEDURE OF TRUNK REGION, PERCUTANEOUS ENDOSCOPIC APPROACH: ICD-10-PCS | Performed by: SURGERY

## 2023-07-28 PROCEDURE — 64488 TAP BLOCK BI INJECTION: CPT | Performed by: ANESTHESIOLOGY

## 2023-07-28 PROCEDURE — C9399 UNCLASSIFIED DRUGS OR BIOLOG: HCPCS | Performed by: NURSE ANESTHETIST, CERTIFIED REGISTERED

## 2023-07-28 PROCEDURE — 6360000002 HC RX W HCPCS: Performed by: STUDENT IN AN ORGANIZED HEALTH CARE EDUCATION/TRAINING PROGRAM

## 2023-07-28 PROCEDURE — 2709999900 HC NON-CHARGEABLE SUPPLY: Performed by: SURGERY

## 2023-07-28 PROCEDURE — 88331 PATH CONSLTJ SURG 1 BLK 1SPC: CPT

## 2023-07-28 PROCEDURE — 85025 COMPLETE CBC W/AUTO DIFF WBC: CPT

## 2023-07-28 PROCEDURE — 7100000001 HC PACU RECOVERY - ADDTL 15 MIN: Performed by: SURGERY

## 2023-07-28 PROCEDURE — 3700000000 HC ANESTHESIA ATTENDED CARE: Performed by: SURGERY

## 2023-07-28 PROCEDURE — 83735 ASSAY OF MAGNESIUM: CPT

## 2023-07-28 PROCEDURE — 86850 RBC ANTIBODY SCREEN: CPT

## 2023-07-28 PROCEDURE — 44204 LAPARO PARTIAL COLECTOMY: CPT | Performed by: SURGERY

## 2023-07-28 PROCEDURE — 3600000009 HC SURGERY ROBOT BASE: Performed by: SURGERY

## 2023-07-28 PROCEDURE — 6360000002 HC RX W HCPCS: Performed by: SURGERY

## 2023-07-28 PROCEDURE — 6370000000 HC RX 637 (ALT 250 FOR IP): Performed by: STUDENT IN AN ORGANIZED HEALTH CARE EDUCATION/TRAINING PROGRAM

## 2023-07-28 PROCEDURE — S2900 ROBOTIC SURGICAL SYSTEM: HCPCS | Performed by: SURGERY

## 2023-07-28 PROCEDURE — 6360000002 HC RX W HCPCS: Performed by: NURSE ANESTHETIST, CERTIFIED REGISTERED

## 2023-07-28 PROCEDURE — 80069 RENAL FUNCTION PANEL: CPT

## 2023-07-28 PROCEDURE — C9290 INJ, BUPIVACAINE LIPOSOME: HCPCS | Performed by: ANESTHESIOLOGY

## 2023-07-28 PROCEDURE — 2500000003 HC RX 250 WO HCPCS: Performed by: STUDENT IN AN ORGANIZED HEALTH CARE EDUCATION/TRAINING PROGRAM

## 2023-07-28 PROCEDURE — 0DBE4ZZ EXCISION OF LARGE INTESTINE, PERCUTANEOUS ENDOSCOPIC APPROACH: ICD-10-PCS | Performed by: SURGERY

## 2023-07-28 PROCEDURE — 6360000002 HC RX W HCPCS: Performed by: ANESTHESIOLOGY

## 2023-07-28 PROCEDURE — 7100000000 HC PACU RECOVERY - FIRST 15 MIN: Performed by: SURGERY

## 2023-07-28 PROCEDURE — 2580000003 HC RX 258: Performed by: ANESTHESIOLOGY

## 2023-07-28 PROCEDURE — 1200000000 HC SEMI PRIVATE

## 2023-07-28 PROCEDURE — 83605 ASSAY OF LACTIC ACID: CPT

## 2023-07-28 PROCEDURE — 2500000003 HC RX 250 WO HCPCS: Performed by: SURGERY

## 2023-07-28 PROCEDURE — 86901 BLOOD TYPING SEROLOGIC RH(D): CPT

## 2023-07-28 PROCEDURE — 6370000000 HC RX 637 (ALT 250 FOR IP)

## 2023-07-28 PROCEDURE — 3600000019 HC SURGERY ROBOT ADDTL 15MIN: Performed by: SURGERY

## 2023-07-28 PROCEDURE — 86900 BLOOD TYPING SEROLOGIC ABO: CPT

## 2023-07-28 PROCEDURE — 3700000001 HC ADD 15 MINUTES (ANESTHESIA): Performed by: SURGERY

## 2023-07-28 PROCEDURE — 36415 COLL VENOUS BLD VENIPUNCTURE: CPT

## 2023-07-28 PROCEDURE — 2720000010 HC SURG SUPPLY STERILE: Performed by: SURGERY

## 2023-07-28 PROCEDURE — 88307 TISSUE EXAM BY PATHOLOGIST: CPT

## 2023-07-28 PROCEDURE — 2580000003 HC RX 258: Performed by: STUDENT IN AN ORGANIZED HEALTH CARE EDUCATION/TRAINING PROGRAM

## 2023-07-28 PROCEDURE — 2500000003 HC RX 250 WO HCPCS: Performed by: NURSE ANESTHETIST, CERTIFIED REGISTERED

## 2023-07-28 PROCEDURE — C9113 INJ PANTOPRAZOLE SODIUM, VIA: HCPCS | Performed by: STUDENT IN AN ORGANIZED HEALTH CARE EDUCATION/TRAINING PROGRAM

## 2023-07-28 PROCEDURE — 85027 COMPLETE CBC AUTOMATED: CPT

## 2023-07-28 RX ORDER — ROCURONIUM BROMIDE 10 MG/ML
INJECTION, SOLUTION INTRAVENOUS PRN
Status: DISCONTINUED | OUTPATIENT
Start: 2023-07-28 | End: 2023-07-28 | Stop reason: SDUPTHER

## 2023-07-28 RX ORDER — FENTANYL CITRATE 50 UG/ML
25 INJECTION, SOLUTION INTRAMUSCULAR; INTRAVENOUS EVERY 5 MIN PRN
Status: DISCONTINUED | OUTPATIENT
Start: 2023-07-28 | End: 2023-07-28 | Stop reason: HOSPADM

## 2023-07-28 RX ORDER — HYDROMORPHONE HYDROCHLORIDE 2 MG/ML
INJECTION, SOLUTION INTRAMUSCULAR; INTRAVENOUS; SUBCUTANEOUS PRN
Status: DISCONTINUED | OUTPATIENT
Start: 2023-07-28 | End: 2023-07-28 | Stop reason: SDUPTHER

## 2023-07-28 RX ORDER — SODIUM CHLORIDE 9 MG/ML
INJECTION, SOLUTION INTRAVENOUS CONTINUOUS PRN
Status: DISCONTINUED | OUTPATIENT
Start: 2023-07-28 | End: 2023-07-28 | Stop reason: SDUPTHER

## 2023-07-28 RX ORDER — SODIUM CHLORIDE 0.9 % (FLUSH) 0.9 %
10 SYRINGE (ML) INJECTION EVERY 12 HOURS SCHEDULED
Status: DISCONTINUED | OUTPATIENT
Start: 2023-07-28 | End: 2023-07-30 | Stop reason: HOSPADM

## 2023-07-28 RX ORDER — SODIUM CHLORIDE 0.9 % (FLUSH) 0.9 %
5-40 SYRINGE (ML) INJECTION EVERY 12 HOURS SCHEDULED
Status: DISCONTINUED | OUTPATIENT
Start: 2023-07-28 | End: 2023-07-28 | Stop reason: HOSPADM

## 2023-07-28 RX ORDER — SODIUM CHLORIDE 9 MG/ML
INJECTION, SOLUTION INTRAVENOUS PRN
Status: DISCONTINUED | OUTPATIENT
Start: 2023-07-28 | End: 2023-07-30 | Stop reason: HOSPADM

## 2023-07-28 RX ORDER — METHOCARBAMOL 500 MG/1
1000 TABLET, FILM COATED ORAL 4 TIMES DAILY
Status: DISCONTINUED | OUTPATIENT
Start: 2023-07-28 | End: 2023-07-30 | Stop reason: HOSPADM

## 2023-07-28 RX ORDER — BUPIVACAINE HYDROCHLORIDE 2.5 MG/ML
INJECTION, SOLUTION EPIDURAL; INFILTRATION; INTRACAUDAL
Status: COMPLETED | OUTPATIENT
Start: 2023-07-28 | End: 2023-07-28

## 2023-07-28 RX ORDER — SODIUM CHLORIDE, SODIUM LACTATE, POTASSIUM CHLORIDE, CALCIUM CHLORIDE 600; 310; 30; 20 MG/100ML; MG/100ML; MG/100ML; MG/100ML
INJECTION, SOLUTION INTRAVENOUS CONTINUOUS
Status: DISCONTINUED | OUTPATIENT
Start: 2023-07-28 | End: 2023-07-29

## 2023-07-28 RX ORDER — OXYCODONE HYDROCHLORIDE 5 MG/1
5 TABLET ORAL EVERY 4 HOURS PRN
Status: DISCONTINUED | OUTPATIENT
Start: 2023-07-28 | End: 2023-07-30 | Stop reason: HOSPADM

## 2023-07-28 RX ORDER — ONDANSETRON 2 MG/ML
4 INJECTION INTRAMUSCULAR; INTRAVENOUS EVERY 6 HOURS PRN
Status: DISCONTINUED | OUTPATIENT
Start: 2023-07-28 | End: 2023-07-30 | Stop reason: HOSPADM

## 2023-07-28 RX ORDER — ACETAMINOPHEN 325 MG/1
1000 TABLET ORAL ONCE
Status: DISCONTINUED | OUTPATIENT
Start: 2023-07-28 | End: 2023-07-28 | Stop reason: HOSPADM

## 2023-07-28 RX ORDER — ATROPINE SULFATE 0.1 MG/ML
INJECTION INTRAVENOUS PRN
Status: DISCONTINUED | OUTPATIENT
Start: 2023-07-28 | End: 2023-07-28 | Stop reason: SDUPTHER

## 2023-07-28 RX ORDER — ACETAMINOPHEN 500 MG
1000 TABLET ORAL EVERY 6 HOURS
Status: DISCONTINUED | OUTPATIENT
Start: 2023-07-28 | End: 2023-07-30 | Stop reason: HOSPADM

## 2023-07-28 RX ORDER — ONDANSETRON 2 MG/ML
4 INJECTION INTRAMUSCULAR; INTRAVENOUS
Status: DISCONTINUED | OUTPATIENT
Start: 2023-07-28 | End: 2023-07-28 | Stop reason: HOSPADM

## 2023-07-28 RX ORDER — LEVOFLOXACIN 5 MG/ML
500 INJECTION, SOLUTION INTRAVENOUS
Status: COMPLETED | OUTPATIENT
Start: 2023-07-28 | End: 2023-07-28

## 2023-07-28 RX ORDER — SODIUM CHLORIDE, SODIUM LACTATE, POTASSIUM CHLORIDE, CALCIUM CHLORIDE 600; 310; 30; 20 MG/100ML; MG/100ML; MG/100ML; MG/100ML
INJECTION, SOLUTION INTRAVENOUS CONTINUOUS
Status: DISCONTINUED | OUTPATIENT
Start: 2023-07-28 | End: 2023-07-28 | Stop reason: HOSPADM

## 2023-07-28 RX ORDER — SODIUM CHLORIDE 9 MG/ML
INJECTION, SOLUTION INTRAVENOUS PRN
Status: DISCONTINUED | OUTPATIENT
Start: 2023-07-28 | End: 2023-07-28 | Stop reason: HOSPADM

## 2023-07-28 RX ORDER — FUROSEMIDE 20 MG/1
20 TABLET ORAL DAILY
Status: DISCONTINUED | OUTPATIENT
Start: 2023-07-29 | End: 2023-07-30 | Stop reason: HOSPADM

## 2023-07-28 RX ORDER — METOPROLOL TARTRATE 5 MG/5ML
INJECTION INTRAVENOUS PRN
Status: DISCONTINUED | OUTPATIENT
Start: 2023-07-28 | End: 2023-07-28 | Stop reason: SDUPTHER

## 2023-07-28 RX ORDER — ENOXAPARIN SODIUM 100 MG/ML
40 INJECTION SUBCUTANEOUS ONCE
Status: COMPLETED | OUTPATIENT
Start: 2023-07-28 | End: 2023-07-28

## 2023-07-28 RX ORDER — ONDANSETRON 2 MG/ML
INJECTION INTRAMUSCULAR; INTRAVENOUS PRN
Status: DISCONTINUED | OUTPATIENT
Start: 2023-07-28 | End: 2023-07-28 | Stop reason: SDUPTHER

## 2023-07-28 RX ORDER — LIDOCAINE HYDROCHLORIDE 20 MG/ML
INJECTION, SOLUTION INTRAVENOUS PRN
Status: DISCONTINUED | OUTPATIENT
Start: 2023-07-28 | End: 2023-07-28 | Stop reason: SDUPTHER

## 2023-07-28 RX ORDER — HYDRALAZINE HYDROCHLORIDE 20 MG/ML
10 INJECTION INTRAMUSCULAR; INTRAVENOUS
Status: DISCONTINUED | OUTPATIENT
Start: 2023-07-28 | End: 2023-07-28 | Stop reason: HOSPADM

## 2023-07-28 RX ORDER — ATORVASTATIN CALCIUM 10 MG/1
10 TABLET, FILM COATED ORAL NIGHTLY
Status: DISCONTINUED | OUTPATIENT
Start: 2023-07-28 | End: 2023-07-30 | Stop reason: HOSPADM

## 2023-07-28 RX ORDER — SODIUM CHLORIDE 0.9 % (FLUSH) 0.9 %
10 SYRINGE (ML) INJECTION PRN
Status: DISCONTINUED | OUTPATIENT
Start: 2023-07-28 | End: 2023-07-30 | Stop reason: HOSPADM

## 2023-07-28 RX ORDER — ONDANSETRON 4 MG/1
4 TABLET, ORALLY DISINTEGRATING ORAL EVERY 8 HOURS PRN
Status: DISCONTINUED | OUTPATIENT
Start: 2023-07-28 | End: 2023-07-30 | Stop reason: HOSPADM

## 2023-07-28 RX ORDER — LABETALOL HYDROCHLORIDE 5 MG/ML
10 INJECTION, SOLUTION INTRAVENOUS
Status: DISCONTINUED | OUTPATIENT
Start: 2023-07-28 | End: 2023-07-28 | Stop reason: HOSPADM

## 2023-07-28 RX ORDER — POTASSIUM CHLORIDE 7.45 MG/ML
10 INJECTION INTRAVENOUS
Status: COMPLETED | OUTPATIENT
Start: 2023-07-28 | End: 2023-07-28

## 2023-07-28 RX ORDER — SODIUM CHLORIDE 0.9 % (FLUSH) 0.9 %
5-40 SYRINGE (ML) INJECTION PRN
Status: DISCONTINUED | OUTPATIENT
Start: 2023-07-28 | End: 2023-07-28 | Stop reason: HOSPADM

## 2023-07-28 RX ORDER — SODIUM CHLORIDE, SODIUM LACTATE, POTASSIUM CHLORIDE, AND CALCIUM CHLORIDE .6; .31; .03; .02 G/100ML; G/100ML; G/100ML; G/100ML
IRRIGANT IRRIGATION PRN
Status: DISCONTINUED | OUTPATIENT
Start: 2023-07-28 | End: 2023-07-28 | Stop reason: HOSPADM

## 2023-07-28 RX ORDER — METRONIDAZOLE 500 MG/100ML
500 INJECTION, SOLUTION INTRAVENOUS
Status: COMPLETED | OUTPATIENT
Start: 2023-07-28 | End: 2023-07-28

## 2023-07-28 RX ORDER — OXYCODONE HYDROCHLORIDE 5 MG/1
10 TABLET ORAL EVERY 4 HOURS PRN
Status: DISCONTINUED | OUTPATIENT
Start: 2023-07-28 | End: 2023-07-30 | Stop reason: HOSPADM

## 2023-07-28 RX ORDER — HYDRALAZINE HYDROCHLORIDE 20 MG/ML
5 INJECTION INTRAMUSCULAR; INTRAVENOUS EVERY 6 HOURS PRN
Status: DISCONTINUED | OUTPATIENT
Start: 2023-07-28 | End: 2023-07-30 | Stop reason: HOSPADM

## 2023-07-28 RX ORDER — DEXAMETHASONE SODIUM PHOSPHATE 4 MG/ML
INJECTION, SOLUTION INTRA-ARTICULAR; INTRALESIONAL; INTRAMUSCULAR; INTRAVENOUS; SOFT TISSUE PRN
Status: DISCONTINUED | OUTPATIENT
Start: 2023-07-28 | End: 2023-07-28 | Stop reason: SDUPTHER

## 2023-07-28 RX ORDER — FLUVASTATIN SODIUM 80 MG/1
80 TABLET, FILM COATED, EXTENDED RELEASE ORAL DAILY
Status: DISCONTINUED | OUTPATIENT
Start: 2023-07-28 | End: 2023-07-28 | Stop reason: CLARIF

## 2023-07-28 RX ORDER — PROPOFOL 10 MG/ML
INJECTION, EMULSION INTRAVENOUS PRN
Status: DISCONTINUED | OUTPATIENT
Start: 2023-07-28 | End: 2023-07-28 | Stop reason: SDUPTHER

## 2023-07-28 RX ORDER — PROCHLORPERAZINE EDISYLATE 5 MG/ML
5 INJECTION INTRAMUSCULAR; INTRAVENOUS
Status: COMPLETED | OUTPATIENT
Start: 2023-07-28 | End: 2023-07-28

## 2023-07-28 RX ORDER — GLYCOPYRROLATE 0.2 MG/ML
INJECTION INTRAMUSCULAR; INTRAVENOUS PRN
Status: DISCONTINUED | OUTPATIENT
Start: 2023-07-28 | End: 2023-07-28 | Stop reason: SDUPTHER

## 2023-07-28 RX ORDER — ENOXAPARIN SODIUM 100 MG/ML
40 INJECTION SUBCUTANEOUS DAILY
Status: DISCONTINUED | OUTPATIENT
Start: 2023-07-29 | End: 2023-07-30

## 2023-07-28 RX ADMIN — BENZOCAINE AND MENTHOL 1 LOZENGE: 15; 3.6 LOZENGE ORAL at 23:56

## 2023-07-28 RX ADMIN — ATORVASTATIN CALCIUM 10 MG: 10 TABLET, FILM COATED ORAL at 21:07

## 2023-07-28 RX ADMIN — SODIUM CHLORIDE, PRESERVATIVE FREE 40 MG: 5 INJECTION INTRAVENOUS at 18:25

## 2023-07-28 RX ADMIN — HYDROMORPHONE HYDROCHLORIDE 0.25 MG: 1 INJECTION, SOLUTION INTRAMUSCULAR; INTRAVENOUS; SUBCUTANEOUS at 22:32

## 2023-07-28 RX ADMIN — POTASSIUM CHLORIDE 10 MEQ: 10 INJECTION, SOLUTION INTRAVENOUS at 13:50

## 2023-07-28 RX ADMIN — METOPROLOL TARTRATE 2.5 MG: 5 INJECTION INTRAVENOUS at 15:09

## 2023-07-28 RX ADMIN — POTASSIUM CHLORIDE 10 MEQ: 10 INJECTION, SOLUTION INTRAVENOUS at 14:08

## 2023-07-28 RX ADMIN — BUPIVACAINE 10 ML: 13.3 INJECTION, SUSPENSION, LIPOSOMAL INFILTRATION at 13:34

## 2023-07-28 RX ADMIN — FENTANYL CITRATE 25 MCG: 50 INJECTION, SOLUTION INTRAMUSCULAR; INTRAVENOUS at 17:28

## 2023-07-28 RX ADMIN — SODIUM CHLORIDE, POTASSIUM CHLORIDE, SODIUM LACTATE AND CALCIUM CHLORIDE: 600; 310; 30; 20 INJECTION, SOLUTION INTRAVENOUS at 18:21

## 2023-07-28 RX ADMIN — SODIUM CHLORIDE, PRESERVATIVE FREE 10 ML: 5 INJECTION INTRAVENOUS at 21:07

## 2023-07-28 RX ADMIN — PROCHLORPERAZINE EDISYLATE 5 MG: 5 INJECTION INTRAMUSCULAR; INTRAVENOUS at 17:29

## 2023-07-28 RX ADMIN — METRONIDAZOLE 500 MG: 500 INJECTION, SOLUTION INTRAVENOUS at 13:25

## 2023-07-28 RX ADMIN — DEXAMETHASONE SODIUM PHOSPHATE 4 MG: 4 INJECTION, SOLUTION INTRAMUSCULAR; INTRAVENOUS at 13:50

## 2023-07-28 RX ADMIN — BUPIVACAINE HYDROCHLORIDE 15 ML: 2.5 INJECTION, SOLUTION EPIDURAL; INFILTRATION; INTRACAUDAL; PERINEURAL at 13:36

## 2023-07-28 RX ADMIN — ENOXAPARIN SODIUM 40 MG: 100 INJECTION SUBCUTANEOUS at 11:56

## 2023-07-28 RX ADMIN — PROPOFOL 100 MG: 10 INJECTION, EMULSION INTRAVENOUS at 13:30

## 2023-07-28 RX ADMIN — HYDROMORPHONE HYDROCHLORIDE 0.5 MG: 2 INJECTION, SOLUTION INTRAMUSCULAR; INTRAVENOUS; SUBCUTANEOUS at 17:01

## 2023-07-28 RX ADMIN — SODIUM CHLORIDE, POTASSIUM CHLORIDE, SODIUM LACTATE AND CALCIUM CHLORIDE: 600; 310; 30; 20 INJECTION, SOLUTION INTRAVENOUS at 11:53

## 2023-07-28 RX ADMIN — BUPIVACAINE 10 ML: 13.3 INJECTION, SUSPENSION, LIPOSOMAL INFILTRATION at 13:36

## 2023-07-28 RX ADMIN — ROCURONIUM BROMIDE 50 MG: 10 INJECTION INTRAVENOUS at 13:30

## 2023-07-28 RX ADMIN — SUGAMMADEX 200 MG: 100 INJECTION, SOLUTION INTRAVENOUS at 16:55

## 2023-07-28 RX ADMIN — HYDROMORPHONE HYDROCHLORIDE 0.5 MG: 1 INJECTION, SOLUTION INTRAMUSCULAR; INTRAVENOUS; SUBCUTANEOUS at 17:38

## 2023-07-28 RX ADMIN — ATROPINE SULFATE 0.5 MG: 0.1 INJECTION, SOLUTION INTRAVENOUS at 14:12

## 2023-07-28 RX ADMIN — LEVOFLOXACIN 500 MG: 5 INJECTION, SOLUTION INTRAVENOUS at 13:25

## 2023-07-28 RX ADMIN — HYDROMORPHONE HYDROCHLORIDE 0.5 MG: 2 INJECTION, SOLUTION INTRAMUSCULAR; INTRAVENOUS; SUBCUTANEOUS at 13:30

## 2023-07-28 RX ADMIN — ATROPINE SULFATE 0.5 MG: 0.1 INJECTION, SOLUTION INTRAVENOUS at 14:15

## 2023-07-28 RX ADMIN — BUPIVACAINE HYDROCHLORIDE 15 ML: 2.5 INJECTION, SOLUTION EPIDURAL; INFILTRATION; INTRACAUDAL; PERINEURAL at 13:34

## 2023-07-28 RX ADMIN — LIDOCAINE HYDROCHLORIDE 80 MG: 20 INJECTION, SOLUTION INTRAVENOUS at 13:30

## 2023-07-28 RX ADMIN — METHOCARBAMOL 1000 MG: 500 TABLET ORAL at 21:07

## 2023-07-28 RX ADMIN — ONDANSETRON 4 MG: 2 INJECTION INTRAMUSCULAR; INTRAVENOUS at 13:50

## 2023-07-28 RX ADMIN — POTASSIUM CHLORIDE 10 MEQ: 10 INJECTION, SOLUTION INTRAVENOUS at 14:16

## 2023-07-28 RX ADMIN — SODIUM CHLORIDE: 9 INJECTION, SOLUTION INTRAVENOUS at 13:25

## 2023-07-28 RX ADMIN — PROPOFOL 50 MG: 10 INJECTION, EMULSION INTRAVENOUS at 13:37

## 2023-07-28 RX ADMIN — POTASSIUM CHLORIDE 10 MEQ: 10 INJECTION, SOLUTION INTRAVENOUS at 14:30

## 2023-07-28 RX ADMIN — GLYCOPYRROLATE 0.2 MG: 0.2 INJECTION INTRAMUSCULAR; INTRAVENOUS at 14:12

## 2023-07-28 ASSESSMENT — PAIN DESCRIPTION - ONSET
ONSET: ON-GOING
ONSET: ON-GOING

## 2023-07-28 ASSESSMENT — PAIN - FUNCTIONAL ASSESSMENT
PAIN_FUNCTIONAL_ASSESSMENT: ACTIVITIES ARE NOT PREVENTED
PAIN_FUNCTIONAL_ASSESSMENT: PREVENTS OR INTERFERES SOME ACTIVE ACTIVITIES AND ADLS
PAIN_FUNCTIONAL_ASSESSMENT: 0-10

## 2023-07-28 ASSESSMENT — PAIN SCALES - GENERAL
PAINLEVEL_OUTOF10: 6
PAINLEVEL_OUTOF10: 0
PAINLEVEL_OUTOF10: 7
PAINLEVEL_OUTOF10: 0
PAINLEVEL_OUTOF10: 7
PAINLEVEL_OUTOF10: 0

## 2023-07-28 ASSESSMENT — PAIN DESCRIPTION - FREQUENCY
FREQUENCY: INTERMITTENT
FREQUENCY: CONTINUOUS

## 2023-07-28 ASSESSMENT — PAIN DESCRIPTION - DESCRIPTORS
DESCRIPTORS: ACHING;DISCOMFORT
DESCRIPTORS: ACHING

## 2023-07-28 ASSESSMENT — PAIN DESCRIPTION - ORIENTATION
ORIENTATION: LEFT
ORIENTATION: MID

## 2023-07-28 ASSESSMENT — PAIN DESCRIPTION - LOCATION
LOCATION: ABDOMEN
LOCATION: ABDOMEN

## 2023-07-28 ASSESSMENT — PAIN DESCRIPTION - PAIN TYPE
TYPE: SURGICAL PAIN
TYPE: SURGICAL PAIN

## 2023-07-28 NOTE — H&P
PRE-OP/PRE-PROCEDURE H&P    Visit Date: 7/28/2023    History:     Claudette Chiang is a 82 y.o. female who presents today for procedure. See my/PCP/oncologist office notes for indications and details.     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     Trichiasis without entropion of right lower eyelid     Nonspecific abnormal electrocardiogram (ECG) (EKG)     Low back pain     Chronic deep vein thrombosis (DVT) of popliteal vein of left lower extremity (HCC)     Chronic renal disease, stage III ([address]) [022450]     Adenoma of descending colon         Current Facility-Administered Medications:     lactated ringers IV soln infusion, , IntraVENous, Continuous, Shasta Sesay, DO, Last Rate: 100 mL/hr at 07/28/23 1153, New Bag at 07/28/23 1153    sodium chloride flush 0.9 % injection 5-40 mL, 5-40 mL, IntraVENous, 2 times per day, Aida Mota MD    sodium chloride flush 0.9 % injection 5-40 mL, 5-40 mL, IntraVENous, PRN, Aida Mota MD    0.9 % sodium chloride infusion, , IntraVENous, PRN, Aida Mota MD    metronidazole (FLAGYL) 500 mg in 0.9% NaCl 100 mL IVPB premix, 500 mg, IntraVENous, On Call to OR **AND** levoFLOXacin (LEVAQUIN) 500 MG/100ML infusion 500 mg, 500 mg, IntraVENous, On Call to OR, Aida Mota MD    acetaminophen (TYLENOL) tablet 975 mg, 975 mg, Oral, Once, Aida Mota MD    potassium chloride 10 mEq/100 mL IVPB (Peripheral Line), 10 mEq, IntraVENous,

## 2023-07-28 NOTE — ANESTHESIA PRE PROCEDURE
Department of Anesthesiology  Preprocedure Note       Name:  Silviano Cochran   Age:  82 y.o.  :  1940                                          MRN:  [id number]         Date:  2023      Surgeon: Zee Cunningham):  Wilmer Coleman MD    Procedure: Procedure(s):  ROBOTIC PARTIAL TRANSVERSE COLECTOMY    Medications prior to admission:   Prior to Admission medications    Medication Sig Start Date End Date Taking? Authorizing Provider   ELIQUIS 5 MG TABS tablet TAKE ONE Tablet BY MOUTH TWICE DAILY 23   Aron Estes APRN - CNP   ondansetron (ZOFRAN-ODT) 4 MG disintegrating tablet Place 1 tablet under the tongue 3 times daily as needed for Nausea or Vomiting 23   Wilmer Coleman MD   metroNIDAZOLE (FLAGYL) 500 MG tablet Take one tablet by mouth 3 times on the day prior to surgery. Take one tablet at 1pm, 2pm and 9pm. 23   Wilmer Coleman MD   neomycin (MYCIFRADIN) 500 MG tablet Take two tablets 3 times the day before surgery.  Take two tablets at 1pm, two tablets at 2pm and two tablets at 9pm. 23   Wilmer Coleman MD   hydrocortisone (ANUSOL-HC) 2.5 % CREA rectal cream Apply as needed twice daily to anal area 23   Ulice Sandifer, MD   LESCOL XL 80 MG extended release tablet TAKE ONE Tablet BY MOUTH EVERY DAY 5/3/23   Ulice Sandifer, MD   furosemide (LASIX) 20 MG tablet TAKE ONE Tablet BY MOUTH AS NEEDED DAILY 3/31/23   Ulice Sandifer, MD   BENICAR 20 MG tablet TAKE ONE Tablet BY MOUTH DAILY 3/3/23   Ulice Sandifer, MD   triamcinolone (KENALOG) 0.1 % cream APPLY to the skin (topically) TWICE DAILY 23   Ulice Sandifer, MD   omeprazole (PRILOSEC) 20 MG delayed release capsule TAKE ONE Capsule BY MOUTH EVERY MORNING 2/15/23   Ulice Sandifer, MD   Omega-3 1000 MG CAPS Take 1 capsule by mouth daily    Historical Provider, MD   Compression Stockings MISC by Does not apply route 20-30 mm 10/18/22   Ulice Sandifer, MD   hydrocortisone (WESTCORT) 0.2 % cream apply topically TO affected AREAS TWICE DAILY 22

## 2023-07-28 NOTE — BRIEF OP NOTE
Brief Postoperative Note      Patient: Dipti Liu  YOB: 1940  MRN: [id number]    Date of Procedure: 2023    Pre-Op Diagnosis Codes:     * Adenomatous polyp of colon, unspecified part of colon [D12.6]    Post-Op Diagnosis: Same       Procedure(s):  ROBOTIC PARTIAL TRANSVERSE COLECTOMY    Surgeon(s):  Barber Arias MD    Assistant:  Surgical Assistant: Clair Apgar  Resident: Truong Rebolledo DO    Anesthesia: General    Estimated Blood Loss (mL): 574    Complications: None    Specimens:   ID Type Source Tests Collected by Time Destination   A : PARTIAL COLECTOMY Tissue Tissue SURGICAL PATHOLOGY Barber Arias MD 2023 1625        Implants:  * No implants in log *      Drains:   Urinary Catheter 23 Arriola (Active)       Findings: polyp visible within specimen, margins confirmed negative with pathology. Side to side stapled anastomosis, common enterotomy closed with multiple layers.   This procedure was not performed to treat colon cancer through resection      Electronically signed by Truong Rebolledo DO on 2023 at 5:12 PM

## 2023-07-28 NOTE — ANESTHESIA PROCEDURE NOTES
Peripheral Block    Patient location during procedure: pre-op  Reason for block: post-op pain management and at surgeon's request  Start time: 7/28/2023 1:34 PM  End time: 7/28/2023 1:36 PM  Staffing  Performed: anesthesiologist   Anesthesiologist: Merle Gottlieb MD  Preanesthetic Checklist  Completed: patient identified, IV checked, site marked, risks and benefits discussed, surgical/procedural consents, equipment checked, pre-op evaluation, timeout performed, anesthesia consent given, oxygen available and monitors applied/VS acknowledged  Peripheral Block   Patient position: supine  Prep: ChloraPrep  Provider prep: mask and sterile gloves  Patient monitoring: cardiac monitor, continuous pulse ox, frequent blood pressure checks, IV access, oxygen and continuous capnometry  Block type: TAP  Laterality: bilateral  Injection technique: single-shot  Guidance: ultrasound guided    Needle   Needle type: insulated echogenic nerve stimulator needle   Needle gauge: 22 G  Needle localization: ultrasound guidance  Needle length: 8 cm  Assessment   Injection assessment: negative aspiration for heme, no paresthesia on injection, local visualized surrounding nerve on ultrasound and no intravascular symptoms  Paresthesia pain: none  Slow fractionated injection: yes  Hemodynamics: stable  Real-time US image taken/store: yes  Outcomes: uncomplicated and patient tolerated procedure well    Additional Notes  Immediately prior to procedure a \"time out\" was called to verify the correct patient, allergies, laterality, procedure and equipment. Time out performed with RN. Local Anesthetic: See below    External Oblique muscle, Internal Oblique muscle, Transversus Abdominis muscle are identified; the tip of the needle and the spread of the local anesthetic between the Internal Oblique muscle and the Transversus Abdominis muscles are visualized.  The muscles appeared to be anatomically normal and there were no abnormal pathologically

## 2023-07-28 NOTE — ANESTHESIA POSTPROCEDURE EVALUATION
Department of Anesthesiology  Postprocedure Note    Patient: Leo Cedeno  MRN: [id number]  YOB: 1940  Date of evaluation: 7/28/2023      Procedure Summary     Date: 07/28/23 Room / Location: [address] [place] [address] [facility]    Anesthesia Start: 0720 Anesthesia Stop: 1714    Procedure: ROBOTIC PARTIAL TRANSVERSE COLECTOMY (Abdomen) Diagnosis:       Adenomatous polyp of colon, unspecified part of colon      (Adenomatous polyp of colon, unspecified part of colon [D12.6])    Surgeons: Rangel Blair MD Responsible Provider: Shannon Maradiaga MD    Anesthesia Type: general ASA Status: 3          Anesthesia Type: No value filed.     Antwan Phase I: Antwan Score: 8    Antwan Phase II:        Anesthesia Post Evaluation    Patient location during evaluation: PACU  Patient participation: complete - patient participated  Level of consciousness: awake and alert  Pain score: 0  Airway patency: patent  Nausea & Vomiting: no nausea and no vomiting  Cardiovascular status: blood pressure returned to baseline  Respiratory status: acceptable  Hydration status: euvolemic  Multimodal analgesia pain management approach  Pain management: adequate

## 2023-07-29 LAB
ALBUMIN SERPL-MCNC: 3.3 G/DL (ref 3.4–5)
ANION GAP SERPL CALCULATED.3IONS-SCNC: 16 MMOL/L (ref 3–16)
BASOPHILS # BLD: 0 K/UL (ref 0–0.2)
BASOPHILS NFR BLD: 0.2 %
BUN SERPL-MCNC: 18 MG/DL (ref 7–20)
CALCIUM SERPL-MCNC: 8.3 MG/DL (ref 8.3–10.6)
CHLORIDE SERPL-SCNC: 103 MMOL/L (ref 99–110)
CO2 SERPL-SCNC: 18 MMOL/L (ref 21–32)
CREAT SERPL-MCNC: 1.3 MG/DL (ref 0.6–1.2)
DEPRECATED RDW RBC AUTO: 15.6 % (ref 12.4–15.4)
EOSINOPHIL # BLD: 0 K/UL (ref 0–0.6)
EOSINOPHIL NFR BLD: 0.1 %
GFR SERPLBLD CREATININE-BSD FMLA CKD-EPI: 41 ML/MIN/{1.73_M2}
GLUCOSE SERPL-MCNC: 95 MG/DL (ref 70–99)
HCT VFR BLD AUTO: 42.1 % (ref 36–48)
HGB BLD-MCNC: 13.3 G/DL (ref 12–16)
LYMPHOCYTES # BLD: 0.7 K/UL (ref 1–5.1)
LYMPHOCYTES NFR BLD: 7.7 %
MAGNESIUM SERPL-MCNC: 1.8 MG/DL (ref 1.8–2.4)
MCH RBC QN AUTO: 27.1 PG (ref 26–34)
MCHC RBC AUTO-ENTMCNC: 31.5 G/DL (ref 31–36)
MCV RBC AUTO: 86.2 FL (ref 80–100)
MONOCYTES # BLD: 0.7 K/UL (ref 0–1.3)
MONOCYTES NFR BLD: 8.3 %
NEUTROPHILS # BLD: 7.2 K/UL (ref 1.7–7.7)
NEUTROPHILS NFR BLD: 83.7 %
PHOSPHATE SERPL-MCNC: 3.6 MG/DL (ref 2.5–4.9)
PLATELET # BLD AUTO: 139 K/UL (ref 135–450)
PMV BLD AUTO: 9.7 FL (ref 5–10.5)
POTASSIUM SERPL-SCNC: 3.9 MMOL/L (ref 3.5–5.1)
RBC # BLD AUTO: 4.89 M/UL (ref 4–5.2)
SODIUM SERPL-SCNC: 137 MMOL/L (ref 136–145)
WBC # BLD AUTO: 8.7 K/UL (ref 4–11)

## 2023-07-29 PROCEDURE — C9113 INJ PANTOPRAZOLE SODIUM, VIA: HCPCS | Performed by: STUDENT IN AN ORGANIZED HEALTH CARE EDUCATION/TRAINING PROGRAM

## 2023-07-29 PROCEDURE — 6370000000 HC RX 637 (ALT 250 FOR IP)

## 2023-07-29 PROCEDURE — 6360000002 HC RX W HCPCS

## 2023-07-29 PROCEDURE — 83735 ASSAY OF MAGNESIUM: CPT

## 2023-07-29 PROCEDURE — 1200000000 HC SEMI PRIVATE

## 2023-07-29 PROCEDURE — 85025 COMPLETE CBC W/AUTO DIFF WBC: CPT

## 2023-07-29 PROCEDURE — 36415 COLL VENOUS BLD VENIPUNCTURE: CPT

## 2023-07-29 PROCEDURE — 2580000003 HC RX 258: Performed by: STUDENT IN AN ORGANIZED HEALTH CARE EDUCATION/TRAINING PROGRAM

## 2023-07-29 PROCEDURE — 6360000002 HC RX W HCPCS: Performed by: STUDENT IN AN ORGANIZED HEALTH CARE EDUCATION/TRAINING PROGRAM

## 2023-07-29 PROCEDURE — 80069 RENAL FUNCTION PANEL: CPT

## 2023-07-29 PROCEDURE — 99024 POSTOP FOLLOW-UP VISIT: CPT | Performed by: SURGERY

## 2023-07-29 PROCEDURE — 6370000000 HC RX 637 (ALT 250 FOR IP): Performed by: STUDENT IN AN ORGANIZED HEALTH CARE EDUCATION/TRAINING PROGRAM

## 2023-07-29 RX ORDER — MAGNESIUM SULFATE IN WATER 40 MG/ML
2000 INJECTION, SOLUTION INTRAVENOUS ONCE
Status: COMPLETED | OUTPATIENT
Start: 2023-07-29 | End: 2023-07-29

## 2023-07-29 RX ORDER — POTASSIUM CHLORIDE 7.45 MG/ML
10 INJECTION INTRAVENOUS
Status: COMPLETED | OUTPATIENT
Start: 2023-07-29 | End: 2023-07-29

## 2023-07-29 RX ORDER — SODIUM CHLORIDE 9 MG/ML
INJECTION, SOLUTION INTRAVENOUS CONTINUOUS
Status: DISCONTINUED | OUTPATIENT
Start: 2023-07-29 | End: 2023-07-29

## 2023-07-29 RX ADMIN — SODIUM CHLORIDE: 9 INJECTION, SOLUTION INTRAVENOUS at 15:55

## 2023-07-29 RX ADMIN — MAGNESIUM SULFATE HEPTAHYDRATE 2000 MG: 40 INJECTION, SOLUTION INTRAVENOUS at 10:16

## 2023-07-29 RX ADMIN — METHOCARBAMOL 1000 MG: 500 TABLET ORAL at 12:36

## 2023-07-29 RX ADMIN — METHOCARBAMOL 1000 MG: 500 TABLET ORAL at 10:08

## 2023-07-29 RX ADMIN — SODIUM CHLORIDE, PRESERVATIVE FREE 10 ML: 5 INJECTION INTRAVENOUS at 21:19

## 2023-07-29 RX ADMIN — METHOCARBAMOL 1000 MG: 500 TABLET ORAL at 17:25

## 2023-07-29 RX ADMIN — BENZOCAINE AND MENTHOL 1 LOZENGE: 15; 3.6 LOZENGE ORAL at 03:10

## 2023-07-29 RX ADMIN — FUROSEMIDE 20 MG: 20 TABLET ORAL at 10:09

## 2023-07-29 RX ADMIN — SODIUM CHLORIDE, PRESERVATIVE FREE 40 MG: 5 INJECTION INTRAVENOUS at 10:09

## 2023-07-29 RX ADMIN — SODIUM CHLORIDE, PRESERVATIVE FREE 10 ML: 5 INJECTION INTRAVENOUS at 10:10

## 2023-07-29 RX ADMIN — ENOXAPARIN SODIUM 40 MG: 100 INJECTION SUBCUTANEOUS at 10:09

## 2023-07-29 RX ADMIN — POTASSIUM CHLORIDE 10 MEQ: 10 INJECTION, SOLUTION INTRAVENOUS at 12:36

## 2023-07-29 RX ADMIN — ATORVASTATIN CALCIUM 10 MG: 10 TABLET, FILM COATED ORAL at 21:19

## 2023-07-29 RX ADMIN — METHOCARBAMOL 1000 MG: 500 TABLET ORAL at 21:19

## 2023-07-29 ASSESSMENT — PAIN DESCRIPTION - LOCATION
LOCATION: ABDOMEN

## 2023-07-29 ASSESSMENT — PAIN DESCRIPTION - ORIENTATION
ORIENTATION: LEFT
ORIENTATION: LEFT

## 2023-07-29 ASSESSMENT — PAIN DESCRIPTION - ONSET: ONSET: ON-GOING

## 2023-07-29 ASSESSMENT — PAIN SCALES - GENERAL
PAINLEVEL_OUTOF10: 7
PAINLEVEL_OUTOF10: 9
PAINLEVEL_OUTOF10: 8
PAINLEVEL_OUTOF10: 6
PAINLEVEL_OUTOF10: 7
PAINLEVEL_OUTOF10: 8
PAINLEVEL_OUTOF10: 0

## 2023-07-29 ASSESSMENT — PAIN DESCRIPTION - DESCRIPTORS
DESCRIPTORS: SORE
DESCRIPTORS: ACHING

## 2023-07-29 ASSESSMENT — PAIN - FUNCTIONAL ASSESSMENT: PAIN_FUNCTIONAL_ASSESSMENT: ACTIVITIES ARE NOT PREVENTED

## 2023-07-29 ASSESSMENT — PAIN DESCRIPTION - FREQUENCY: FREQUENCY: INTERMITTENT

## 2023-07-29 ASSESSMENT — PAIN DESCRIPTION - PAIN TYPE: TYPE: SURGICAL PAIN

## 2023-07-29 NOTE — PLAN OF CARE
Problem: Pain  Goal: Verbalizes/displays adequate comfort level or baseline comfort level  Outcome: Progressing   Patient to continue to monitor her own pain and notify staff when appropriate.

## 2023-07-29 NOTE — OP NOTE
[facility], [address] [place] [address]                                OPERATIVE REPORT    PATIENT NAME: Chintan Delarosa                 :        1940  MED REC NO:   [id number]                          ROOM:       5307  ACCOUNT NO:   [de-identified]                           ADMIT DATE: 2023  PROVIDER:     Jane Delong MD    DATE OF PROCEDURE:  2023    PREOPERATIVE DIAGNOSIS:  Endoscopic unresectable polyp of the transverse  colon. POSTOPERATIVE DIAGNOSIS:  Endoscopic unresectable polyp of the  transverse colon. OPERATION PERFORMED:  Robotic-assisted laparoscopic partial colectomy  with colocolonic anastomosis. SURGEON:  Jane Delong MD    ANESTHESIA:  General endotracheal.    COMPLICATIONS:  None. SPECIMEN:  Partial colectomy. ESTIMATED BLOOD LOSS:  Minimal.    INDICATIONS:  The patient is an 81-year-old female. She had had  numerous colonoscopies and attempted polypectomies of a  sessile-appearing polypoid mass. I had actually attempted a  laparoscopic-assisted polypectomy but was unable to completely remove  the polyp. She then underwent repeat endoscopy with GI who also  confirmed that despite various endoscopic techniques, this polyp was  unresectable endoscopically. She was referred to me for further  evaluation and to consider surgical intervention. I discussed with her  the plan for robotic-assisted laparoscopic partial colectomy, with plan  for anastomosis. We discussed the risks of the procedure including, but  not limited to, risk of bleeding, infection, anastomotic leak, hernia,  need for additional operations, damage to intra-abdominal structures  including, but not limited to, bowel, bladder, ureter, and neurovascular  structures. She understood the potential need for an open operation and  potential ostomy.     DESCRIPTION OF PROCEDURE:  The patient was brought to the

## 2023-07-30 VITALS
WEIGHT: 158.8 LBS | HEIGHT: 62 IN | OXYGEN SATURATION: 99 % | RESPIRATION RATE: 16 BRPM | SYSTOLIC BLOOD PRESSURE: 149 MMHG | TEMPERATURE: 98.9 F | DIASTOLIC BLOOD PRESSURE: 76 MMHG | HEART RATE: 69 BPM | BODY MASS INDEX: 29.22 KG/M2

## 2023-07-30 LAB
ALBUMIN SERPL-MCNC: 3.2 G/DL (ref 3.4–5)
ANION GAP SERPL CALCULATED.3IONS-SCNC: 11 MMOL/L (ref 3–16)
BASOPHILS # BLD: 0 K/UL (ref 0–0.2)
BASOPHILS # BLD: 0 K/UL (ref 0–0.2)
BASOPHILS NFR BLD: 0.6 %
BASOPHILS NFR BLD: 0.6 %
BUN SERPL-MCNC: 13 MG/DL (ref 7–20)
CALCIUM SERPL-MCNC: 7.9 MG/DL (ref 8.3–10.6)
CHLORIDE SERPL-SCNC: 108 MMOL/L (ref 99–110)
CO2 SERPL-SCNC: 21 MMOL/L (ref 21–32)
CREAT SERPL-MCNC: 1.2 MG/DL (ref 0.6–1.2)
DEPRECATED RDW RBC AUTO: 15.8 % (ref 12.4–15.4)
DEPRECATED RDW RBC AUTO: 15.8 % (ref 12.4–15.4)
EOSINOPHIL # BLD: 0.3 K/UL (ref 0–0.6)
EOSINOPHIL # BLD: 0.3 K/UL (ref 0–0.6)
EOSINOPHIL NFR BLD: 3.8 %
EOSINOPHIL NFR BLD: 4.3 %
GFR SERPLBLD CREATININE-BSD FMLA CKD-EPI: 45 ML/MIN/{1.73_M2}
GLUCOSE SERPL-MCNC: 110 MG/DL (ref 70–99)
HCT VFR BLD AUTO: 29.4 % (ref 36–48)
HCT VFR BLD AUTO: 31.4 % (ref 36–48)
HGB BLD-MCNC: 10.3 G/DL (ref 12–16)
HGB BLD-MCNC: 9.7 G/DL (ref 12–16)
LYMPHOCYTES # BLD: 1.3 K/UL (ref 1–5.1)
LYMPHOCYTES # BLD: 1.3 K/UL (ref 1–5.1)
LYMPHOCYTES NFR BLD: 17.6 %
LYMPHOCYTES NFR BLD: 18.5 %
MAGNESIUM SERPL-MCNC: 1.9 MG/DL (ref 1.8–2.4)
MCH RBC QN AUTO: 27.9 PG (ref 26–34)
MCH RBC QN AUTO: 28.1 PG (ref 26–34)
MCHC RBC AUTO-ENTMCNC: 32.9 G/DL (ref 31–36)
MCHC RBC AUTO-ENTMCNC: 33 G/DL (ref 31–36)
MCV RBC AUTO: 84.9 FL (ref 80–100)
MCV RBC AUTO: 85.2 FL (ref 80–100)
MONOCYTES # BLD: 0.6 K/UL (ref 0–1.3)
MONOCYTES # BLD: 0.6 K/UL (ref 0–1.3)
MONOCYTES NFR BLD: 7.7 %
MONOCYTES NFR BLD: 7.7 %
NEUTROPHILS # BLD: 4.9 K/UL (ref 1.7–7.7)
NEUTROPHILS # BLD: 5.1 K/UL (ref 1.7–7.7)
NEUTROPHILS NFR BLD: 69.4 %
NEUTROPHILS NFR BLD: 69.8 %
PHOSPHATE SERPL-MCNC: 2.1 MG/DL (ref 2.5–4.9)
PLATELET # BLD AUTO: 205 K/UL (ref 135–450)
PLATELET # BLD AUTO: 215 K/UL (ref 135–450)
PMV BLD AUTO: 8.5 FL (ref 5–10.5)
PMV BLD AUTO: 8.8 FL (ref 5–10.5)
POTASSIUM SERPL-SCNC: 3.2 MMOL/L (ref 3.5–5.1)
RBC # BLD AUTO: 3.45 M/UL (ref 4–5.2)
RBC # BLD AUTO: 3.7 M/UL (ref 4–5.2)
SODIUM SERPL-SCNC: 140 MMOL/L (ref 136–145)
WBC # BLD AUTO: 7.1 K/UL (ref 4–11)
WBC # BLD AUTO: 7.4 K/UL (ref 4–11)

## 2023-07-30 PROCEDURE — 6370000000 HC RX 637 (ALT 250 FOR IP): Performed by: STUDENT IN AN ORGANIZED HEALTH CARE EDUCATION/TRAINING PROGRAM

## 2023-07-30 PROCEDURE — 2500000003 HC RX 250 WO HCPCS

## 2023-07-30 PROCEDURE — 85025 COMPLETE CBC W/AUTO DIFF WBC: CPT

## 2023-07-30 PROCEDURE — 6370000000 HC RX 637 (ALT 250 FOR IP)

## 2023-07-30 PROCEDURE — 99024 POSTOP FOLLOW-UP VISIT: CPT | Performed by: SURGERY

## 2023-07-30 PROCEDURE — 80069 RENAL FUNCTION PANEL: CPT

## 2023-07-30 PROCEDURE — 2580000003 HC RX 258

## 2023-07-30 PROCEDURE — 83735 ASSAY OF MAGNESIUM: CPT

## 2023-07-30 PROCEDURE — 36415 COLL VENOUS BLD VENIPUNCTURE: CPT

## 2023-07-30 PROCEDURE — C9113 INJ PANTOPRAZOLE SODIUM, VIA: HCPCS | Performed by: STUDENT IN AN ORGANIZED HEALTH CARE EDUCATION/TRAINING PROGRAM

## 2023-07-30 PROCEDURE — 2580000003 HC RX 258: Performed by: STUDENT IN AN ORGANIZED HEALTH CARE EDUCATION/TRAINING PROGRAM

## 2023-07-30 PROCEDURE — 6360000002 HC RX W HCPCS: Performed by: STUDENT IN AN ORGANIZED HEALTH CARE EDUCATION/TRAINING PROGRAM

## 2023-07-30 RX ORDER — OXYCODONE HYDROCHLORIDE 5 MG/1
5 TABLET ORAL EVERY 6 HOURS PRN
Qty: 20 TABLET | Refills: 0 | Status: SHIPPED | OUTPATIENT
Start: 2023-07-30 | End: 2023-07-30 | Stop reason: HOSPADM

## 2023-07-30 RX ORDER — METHOCARBAMOL 750 MG/1
750 TABLET, FILM COATED ORAL 4 TIMES DAILY
Qty: 20 TABLET | Refills: 0 | Status: SHIPPED | OUTPATIENT
Start: 2023-07-30 | End: 2023-08-04

## 2023-07-30 RX ORDER — POTASSIUM CHLORIDE 20 MEQ/1
40 TABLET, EXTENDED RELEASE ORAL ONCE
Status: COMPLETED | OUTPATIENT
Start: 2023-07-30 | End: 2023-07-30

## 2023-07-30 RX ADMIN — POTASSIUM PHOSPHATE, MONOBASIC AND POTASSIUM PHOSPHATE, DIBASIC 20 MMOL: 224; 236 INJECTION, SOLUTION, CONCENTRATE INTRAVENOUS at 11:06

## 2023-07-30 RX ADMIN — FUROSEMIDE 20 MG: 20 TABLET ORAL at 08:22

## 2023-07-30 RX ADMIN — SODIUM CHLORIDE, PRESERVATIVE FREE 10 ML: 5 INJECTION INTRAVENOUS at 08:23

## 2023-07-30 RX ADMIN — SODIUM CHLORIDE, PRESERVATIVE FREE 40 MG: 5 INJECTION INTRAVENOUS at 08:23

## 2023-07-30 RX ADMIN — POTASSIUM CHLORIDE 40 MEQ: 1500 TABLET, EXTENDED RELEASE ORAL at 10:46

## 2023-07-30 RX ADMIN — METHOCARBAMOL 1000 MG: 500 TABLET ORAL at 16:42

## 2023-07-30 RX ADMIN — METHOCARBAMOL 1000 MG: 500 TABLET ORAL at 14:39

## 2023-07-30 RX ADMIN — METHOCARBAMOL 1000 MG: 500 TABLET ORAL at 08:22

## 2023-07-30 RX ADMIN — ENOXAPARIN SODIUM 40 MG: 100 INJECTION SUBCUTANEOUS at 08:22

## 2023-07-30 ASSESSMENT — PAIN DESCRIPTION - LOCATION
LOCATION: ABDOMEN

## 2023-07-30 ASSESSMENT — PAIN DESCRIPTION - DESCRIPTORS: DESCRIPTORS: ACHING

## 2023-07-30 ASSESSMENT — PAIN SCALES - GENERAL
PAINLEVEL_OUTOF10: 6
PAINLEVEL_OUTOF10: 7
PAINLEVEL_OUTOF10: 3
PAINLEVEL_OUTOF10: 5

## 2023-07-30 ASSESSMENT — PAIN DESCRIPTION - ORIENTATION: ORIENTATION: MID

## 2023-07-30 NOTE — DISCHARGE SUMMARY
appropriate  Chest/Lungs: No adventitious breathing. NL respiratory effort  Cardiovascular: RRR,  Abdomen: soft, abd binder on. appropriately tender, non-distended, +BS, no guarding/rigidity. Incision sites C/D/I, dermabond intact   Extremities: no edema, no cyanosis  Neuro: A&Ox3, no focal deficits, sensation intact       Disposition:  home    Condition at discharge:  Stable    Discharge Instructions:  See separate form    Patient Instructions:      Medication List        CONTINUE taking these medications      Compression Stockings Misc  by Does not apply route 20-30 mm            ASK your doctor about these medications      Benicar 20 MG tablet  Generic drug: olmesartan  TAKE ONE Tablet BY MOUTH DAILY     carboxymethylcellulose 1 % ophthalmic solution     Eliquis 5 MG Tabs tablet  Generic drug: apixaban  TAKE ONE Tablet BY MOUTH TWICE DAILY     furosemide 20 MG tablet  Commonly known as: LASIX  TAKE ONE Tablet BY MOUTH AS NEEDED DAILY     hydrocortisone 0.2 % cream  Commonly known as: WESTCORT  apply topically TO affected AREAS TWICE DAILY     hydrocortisone 2.5 % Crea rectal cream  Commonly known as: ANUSOL-HC  Apply as needed twice daily to anal area     Lescol XL 80 MG extended release tablet  Generic drug: fluvastatin  TAKE ONE Tablet BY MOUTH EVERY DAY     metroNIDAZOLE 500 MG tablet  Commonly known as: Flagyl  Take one tablet by mouth 3 times on the day prior to surgery. Take one tablet at 1pm, 2pm and 9pm.     Monistat 7 Combo Pack Ton 100 & 2 MG-% (9GM) Kit  Generic drug: Miconazole Nitrate Applicator  Apply daily     neomycin 500 MG tablet  Commonly known as: MYCIFRADIN  Take two tablets 3 times the day before surgery.  Take two tablets at 1pm, two tablets at 2pm and two tablets at 9pm.     Omega-3 1000 MG Caps     omeprazole 20 MG delayed release capsule  Commonly known as: PRILOSEC  TAKE ONE Capsule BY MOUTH EVERY MORNING     ondansetron 4 MG disintegrating tablet  Commonly known as: ZOFRAN-ODT  Place 1 Isotretinoin Pregnancy And Lactation Text: This medication is Pregnancy Category X and is considered extremely dangerous during pregnancy. It is unknown if it is excreted in breast milk.

## 2023-07-30 NOTE — DISCHARGE INSTRUCTIONS
Discharge Instructions:    Diet:   You may resume a regular diet. Wound Care:   Skin glue was used to cover your incision(s). It will fall off on its own in about 10 days. You may shower, but do not scrub the incision sites directly or soak (tub, pool, etc.). Activity:   No heavy lifting greater than a milk jug until follow up. Pain management:   Unless informed of any restrictions by your primary care physician, please use your preferred over-the-counter pain reliever as your primary pain medication. If you have pain that persists despite over-the-counter pain medications, take your hydrocodone that you already have at home. Please be aware, that if your medication contains Tylenol, you may be going over the 4000 mg limit if you take supplemental over the counter Tylenol. You were also taking Robaxin here. You were provided with a script for the same. Return Precautions:   Call/ Return to ED for increased redness, worsening pain, drainage from wound, fevers, or any other concerns about your incision or post op course. Follow up with Dr. Gómez Cantu in 1-2 weeks. Please call [phone] to schedule your appointment.

## 2023-07-31 ENCOUNTER — TELEPHONE (OUTPATIENT)
Dept: SURGERY | Age: 83
End: 2023-07-31

## 2023-07-31 ENCOUNTER — CARE COORDINATION (OUTPATIENT)
Dept: CASE MANAGEMENT | Age: 83
End: 2023-07-31

## 2023-07-31 DIAGNOSIS — D12.4 ADENOMATOUS POLYP OF DESCENDING COLON: Primary | ICD-10-CM

## 2023-07-31 PROCEDURE — 1111F DSCHRG MED/CURRENT MED MERGE: CPT | Performed by: FAMILY MEDICINE

## 2023-07-31 NOTE — CARE COORDINATION
No.    Follow Up  Future Appointments   Date Time Provider [address]   8/15/2023 11:30 AM Gloria Matthews MD [facility]   11/3/2023  9:30 AM JOSIANE BullockO JULIANNE WILLIAMSON NASO       Care Transition Nurse provided contact information. Plan for follow-up call in 5-7 days based on severity of symptoms and risk factors. Plan for next call: symptom management-Any worsening pain, any post op complications that may arise.     Thank Renell Boeck, RN  Care Transition Coordinator  Contact KAROLINE:[phone]

## 2023-07-31 NOTE — TELEPHONE ENCOUNTER
Patient called in stating that she had a bowel movement today and there was blood in the in the toilet     Patient would like to know if this is normal    Please contact the patient at [phone]

## 2023-07-31 NOTE — TELEPHONE ENCOUNTER
Patient called asking if she needs to wear the binder all the time. Can she take it off when she is sitting in the chair?     Please call: [phone]

## 2023-08-01 ENCOUNTER — TELEPHONE (OUTPATIENT)
Dept: SURGERY | Age: 83
End: 2023-08-01

## 2023-08-01 NOTE — TELEPHONE ENCOUNTER
Care Transitions Initial Follow Up Call    Outreach made within 2 business days of discharge: Yes    Patient: Guanaco Agarwal Patient : 1940   MRN: [id number]  Reason for Admission: There are no discharge diagnoses documented for the most recent discharge. Discharge Date: 23       Spoke with: Pt    Discharge department/facility: [facility] Interactive Patient Contact:  Was patient able to fill all prescriptions: Yes  Was patient instructed to bring all medications to the follow-up visit: Yes  Is patient taking all medications as directed in the discharge summary?  Yes  Does patient understand their discharge instructions: Yes  Does patient have questions or concerns that need addressed prior to 7-14 day follow up office visit: no    Scheduled appointment with PCP within 7-14 days    Follow Up  Future Appointments   Date Time Provider [address] [place] [address]   2023 11:30 AM MD RODERICK Julian [facility] [place] - [facility]   8/15/2023 11:30 AM Demetrius Sarmiento MD [facility]   11/3/2023  9:30 AM JOSIANE WingO JULIANNE CLAY [place], [place]

## 2023-08-01 NOTE — TELEPHONE ENCOUNTER
Patient called stating there is still a lot of blood in toilet when she goes. How long can she expect that? Also when can stop taking Methocarbamol?     Please call: [phone]

## 2023-08-01 NOTE — RESULT ENCOUNTER NOTE
Spoke with her. Overall doing very well from surgery. Had a little bit of rectal bleeding but is now improving. Discussed pathology showing adenomatous tissue without signs of malignancy. Overall she is very happy she had the surgery done.

## 2023-08-07 ENCOUNTER — CARE COORDINATION (OUTPATIENT)
Dept: CASE MANAGEMENT | Age: 83
End: 2023-08-07

## 2023-08-07 ENCOUNTER — TELEPHONE (OUTPATIENT)
Dept: SURGERY | Age: 83
End: 2023-08-07

## 2023-08-07 NOTE — TELEPHONE ENCOUNTER
Patient called because she believes she is constipated. She has not had a BM since Saturday. Is she able to take ensure, or anything else recommended, to assist with the discomfort?     Please call back at [phone]

## 2023-08-11 ENCOUNTER — OFFICE VISIT (OUTPATIENT)
Dept: FAMILY MEDICINE CLINIC | Age: 83
End: 2023-08-11

## 2023-08-11 VITALS
WEIGHT: 160 LBS | DIASTOLIC BLOOD PRESSURE: 72 MMHG | OXYGEN SATURATION: 97 % | SYSTOLIC BLOOD PRESSURE: 126 MMHG | HEIGHT: 62 IN | HEART RATE: 75 BPM | BODY MASS INDEX: 29.44 KG/M2

## 2023-08-11 DIAGNOSIS — Z09 HOSPITAL DISCHARGE FOLLOW-UP: Primary | ICD-10-CM

## 2023-08-11 DIAGNOSIS — D12.3 ADENOMATOUS POLYP OF TRANSVERSE COLON: ICD-10-CM

## 2023-08-11 DIAGNOSIS — I10 ESSENTIAL HYPERTENSION, BENIGN: ICD-10-CM

## 2023-08-11 DIAGNOSIS — K64.4 HEMORRHOIDAL SKIN TAGS: ICD-10-CM

## 2023-08-11 RX ORDER — HYDROCORTISONE 25 MG/G
CREAM TOPICAL
Qty: 30 G | Refills: 5 | Status: SHIPPED | OUTPATIENT
Start: 2023-08-11

## 2023-08-11 ASSESSMENT — ENCOUNTER SYMPTOMS
ABDOMINAL PAIN: 0
ABDOMINAL DISTENTION: 0
CONSTIPATION: 0
DIARRHEA: 0
RESPIRATORY NEGATIVE: 1
NAUSEA: 0

## 2023-08-11 NOTE — PROGRESS NOTES
distension. Palpations: There is no mass. Tenderness: There is no abdominal tenderness. There is no guarding or rebound. Comments: Healing laparoscopy scars   Musculoskeletal:      Cervical back: Neck supple. Lymphadenopathy:      Cervical: No cervical adenopathy. Skin:     General: Skin is warm and dry. Neurological:      Mental Status: She is alert and oriented to person, place, and time. Psychiatric:         Mood and Affect: Mood normal.         Behavior: Behavior normal.         Thought Content: Thought content normal.         Judgment: Judgment normal.                An electronic signature was used to authenticate this note.     --Marielos Coello MD

## 2023-08-14 ENCOUNTER — CARE COORDINATION (OUTPATIENT)
Dept: CASE MANAGEMENT | Age: 83
End: 2023-08-14

## 2023-08-14 DIAGNOSIS — D12.3 ADENOMATOUS POLYP OF TRANSVERSE COLON: Primary | ICD-10-CM

## 2023-08-14 PROCEDURE — 1111F DSCHRG MED/CURRENT MED MERGE: CPT | Performed by: FAMILY MEDICINE

## 2023-08-14 NOTE — CARE COORDINATION
[address] [facility] Care Transitions Initial Follow Up Call    Call within 2 business days of discharge: Yes    Patient Current Location:  Home: [address] [place] [address] & [facility] [address]    LPN Care Coordinator contacted the patient by telephone to perform post hospital discharge assessment. Verified name and  with patient as identifiers. Provided introduction to self, and explanation of the LPN Care Coordinator role. Patient: Guanaco Agarwal Patient : 1940   MRN: [id number]  Reason for Admission: Adenomatous polyp of transverse colon  Discharge Date: 23 RARS: Readmission Risk Score: 11      Last Discharge [address] [facility]       Date Complaint Diagnosis Description Type Department Provider    23  Post-op pain . .. Admission (Discharged) [facility] 5 Alexandrea Ontiveros MD            Was this an external facility discharge? No [facility]   Additional needs identified to be addressed with provider: No  none               Method of communication with provider: none. Spoke with Guanaco Agarwal who reported that she was doing good. Patient reported that she will be seeing Dr Gómez Cantu tomorrow for her HFU. Patient denied cp, sob, cough, dizziness, headache, n/v, diarrhea, abdominal pains, fever, or chills. Patient report that appetite and fluid intake is good and denied any problems with bowel or bladder. Patient reported that he is taking all medications as ordered. Writer and patient did a complete medication review and 1111f was completed. Patient denied any other needs at this time. Patient instructed to continue to monitor s/s, reporting any that may present to MD immediately for early intervention. Patient is agreeable to f/u calls. [facility] provided contact information for future needs. LPN Care Coordinator reviewed discharge instructions, medical action plan, and red flags with patient who verbalized understanding.  The patient was given an

## 2023-08-15 ENCOUNTER — OFFICE VISIT (OUTPATIENT)
Dept: SURGERY | Age: 83
End: 2023-08-15

## 2023-08-15 VITALS
TEMPERATURE: 97.4 F | HEIGHT: 62 IN | OXYGEN SATURATION: 100 % | DIASTOLIC BLOOD PRESSURE: 82 MMHG | WEIGHT: 159 LBS | BODY MASS INDEX: 29.26 KG/M2 | HEART RATE: 69 BPM | SYSTOLIC BLOOD PRESSURE: 181 MMHG

## 2023-08-15 DIAGNOSIS — I82.452 DEEP VENOUS THROMBOSIS (DVT) OF LEFT PERONEAL VEIN, UNSPECIFIED CHRONICITY (HCC): ICD-10-CM

## 2023-08-15 DIAGNOSIS — D12.4 ADENOMATOUS POLYP OF DESCENDING COLON: Primary | ICD-10-CM

## 2023-08-15 PROCEDURE — 99024 POSTOP FOLLOW-UP VISIT: CPT | Performed by: SURGERY

## 2023-08-15 NOTE — TELEPHONE ENCOUNTER
Medication:   Requested Prescriptions     Pending Prescriptions Disp Refills    apixaban (ELIQUIS) 5 MG TABS tablet 60 tablet 0     Sig: Take 1 tablet by mouth 2 times daily        Last Filled:  60 x 0 RF 7/20/23    Patient Phone Number: [phone] (home)     Last appt: 8/11/2023   Next appt: Visit date not found    Last OARRS: No flowsheet data found.

## 2023-08-15 NOTE — TELEPHONE ENCOUNTER
Medication and Quantity requested: ELIQUIS 5 MG TABS tablet [[id number]       Last Visit  8/11/23    Pharmacy and phone number updated in EPIC:  yes

## 2023-08-19 DIAGNOSIS — K21.9 GASTROESOPHAGEAL REFLUX DISEASE: ICD-10-CM

## 2023-08-21 RX ORDER — OMEPRAZOLE 20 MG/1
CAPSULE, DELAYED RELEASE ORAL
Qty: 30 CAPSULE | Refills: 5 | Status: SHIPPED | OUTPATIENT
Start: 2023-08-21

## 2023-08-22 ENCOUNTER — CARE COORDINATION (OUTPATIENT)
Dept: CASE MANAGEMENT | Age: 83
End: 2023-08-22

## 2023-08-22 NOTE — CARE COORDINATION
[facility] Care Transitions Follow Up Call    Patient Current Location:  Home: [address] [place]  [address] [facility]    Care Transition Nurse contacted the patient by telephone to follow up after admission on 2023. Verified name and  with patient as identifiers. Patient: Jose Guadalupe Andrews  Patient : 1940   MRN: [id number]    Reason for Admission: S/P Robotic Lap. Partial Colectomy  Discharge Date: 23  RARS: Readmission Risk Score: 11      Needs to be reviewed by the provider   Additional needs identified to be addressed with provider: No  none             Method of communication with provider: none. CTN spoke with patient this afternoon for follow up CTN call. Patient states she is doing well. No pain at time of CTN call, denies having any fever, chills, nausea, vomiting, chest pain, SOB or cough. Patient with no congestion, difficulty emptying bladder, LE edema, feeling lightheaded, dizziness, and heart palpitations. Patient states she is having regular BM's, incisions have healed well, patient states she had follow up with surgeon, no new or changed medications, no other issues or concerns at this time. Patient also had follow up with PCP. Addressed changes since last contact:  none  Discussed follow-up appointments. If no appointment was previously scheduled, appointment scheduling offered: Yes. Is follow up appointment scheduled within 7 days of discharge? Yes. Follow Up  Future Appointments   Date Time Provider [address]   11/3/2023  9:30 AM JOSIANE PickeringO     Care Transition Nurse reviewed red flags with patient and discussed any barriers to care and/or understanding of plan of care after discharge. Discussed appropriate site of care based on symptoms and resources available to patient including: PCP  Specialist  Urgent care clinics  When to call 911.  The patient agrees to contact the PCP office for questions related to their

## 2023-08-30 ENCOUNTER — CARE COORDINATION (OUTPATIENT)
Dept: CASE MANAGEMENT | Age: 83
End: 2023-08-30

## 2023-08-30 NOTE — CARE COORDINATION
[facility] Transitions Initial Follow Up Call    Call within 2 business days of discharge: Yes    Patient:  Tigist Melton   Patient :  1940  MRN:  [id number]   Reason for Admission:    Discharge Date:  23 RARS:       Transitions of Care Initial Call    Was this an external facility discharge? Discharge Facility: [address] [place] [address] Av to be reviewed by the provider   Additional needs identified to be addressed with provider:    no    AMB CC Provider Discharge Needs: none            Non-face-to-face services provided:    1ST CTC attempt to reach Pt regarding recent hospital discharge. CTC left voice recording with call back number requesting a call back. CTN will close out CTN episode at this time, as today was scheduled for final follow up call.     Follow up appointments:    Future Appointments   Date Time Provider [address]   11/3/2023  9:30 AM JOSIANE FlorO JULIANNE FIGUEROAO     Thank You,    Mirta Thapa RN  Care Transition Coordinator  Contact [facility]:[phone]

## 2023-09-05 ENCOUNTER — TELEPHONE (OUTPATIENT)
Dept: SURGERY | Age: 83
End: 2023-09-05

## 2023-09-05 NOTE — TELEPHONE ENCOUNTER
Patient called asking if she is able to have the epidural that she gets every 3 months. She has to lay on her left side to get it.     Please call: [phone]

## 2023-09-07 ENCOUNTER — TELEPHONE (OUTPATIENT)
Dept: FAMILY MEDICINE CLINIC | Age: 83
End: 2023-09-07

## 2023-09-07 ENCOUNTER — OFFICE VISIT (OUTPATIENT)
Dept: FAMILY MEDICINE CLINIC | Age: 83
End: 2023-09-07

## 2023-09-07 VITALS
SYSTOLIC BLOOD PRESSURE: 138 MMHG | OXYGEN SATURATION: 98 % | DIASTOLIC BLOOD PRESSURE: 80 MMHG | HEIGHT: 62 IN | HEART RATE: 60 BPM | BODY MASS INDEX: 29.26 KG/M2 | RESPIRATION RATE: 16 BRPM | WEIGHT: 159 LBS

## 2023-09-07 DIAGNOSIS — L30.9 DERMATITIS: Primary | ICD-10-CM

## 2023-09-07 RX ORDER — BETAMETHASONE DIPROPIONATE 0.05 %
OINTMENT (GRAM) TOPICAL
Qty: 50 G | Refills: 3 | Status: SHIPPED | OUTPATIENT
Start: 2023-09-07

## 2023-09-07 ASSESSMENT — ENCOUNTER SYMPTOMS
WHEEZING: 0
GASTROINTESTINAL NEGATIVE: 1
COUGH: 0
SHORTNESS OF BREATH: 0
CHEST TIGHTNESS: 0

## 2023-09-07 ASSESSMENT — PATIENT HEALTH QUESTIONNAIRE - PHQ9
1. LITTLE INTEREST OR PLEASURE IN DOING THINGS: 0
SUM OF ALL RESPONSES TO PHQ QUESTIONS 1-9: 0
2. FEELING DOWN, DEPRESSED OR HOPELESS: 0
SUM OF ALL RESPONSES TO PHQ9 QUESTIONS 1 & 2: 0
SUM OF ALL RESPONSES TO PHQ QUESTIONS 1-9: 0

## 2023-09-07 NOTE — PROGRESS NOTES
2023     Deward Spatz (:  1940) is a 82 y.o. female, here for evaluation of the following medical concerns:    Chief Complaint   Patient presents with    Rash     On abdomin, lower back and leg, keeps spreading, x3 weeks     Patient arrives today with concerns of a rash to bilateral flanks, above coccyx and on left upper arm. Onset 3-4 weeks ago. Was seen by dermatology and given betamethasone cream which helped, then she ran out of the cream and the rash returned. Denies pain, positive for itching. No recent illness. Review of Systems   Constitutional:  Negative for chills, diaphoresis, fatigue and fever. Respiratory:  Negative for cough, chest tightness, shortness of breath and wheezing. Cardiovascular:  Negative for chest pain and palpitations. Gastrointestinal: Negative. Genitourinary: Negative. Skin:  Positive for rash. Neurological:  Negative for dizziness, weakness and headaches. Prior to Visit Medications    Medication Sig Taking? Authorizing Provider   betamethasone dipropionate 0.05 % ointment Apply topically daily.  Yes SARAH Bacon - CNP   omeprazole (PRILOSEC) 20 MG delayed release capsule TAKE ONE Capsule BY MOUTH EVERY MORNING Yes Alvin Bolanos MD   apixaban (ELIQUIS) 5 MG TABS tablet Take 1 tablet by mouth 2 times daily Yes Alvin Bolanos MD   hydrocortisone (ANUSOL-HC) 2.5 % CREA rectal cream Apply as needed twice daily to anal area Yes Alvin Bolanos MD   LESCOL XL 80 MG extended release tablet TAKE ONE Tablet BY MOUTH EVERY DAY Yes Alvin Bolanos MD   furosemide (LASIX) 20 MG tablet TAKE ONE Tablet BY MOUTH AS NEEDED DAILY Yes Alvin Bolanos MD   BENICAR 20 MG tablet TAKE ONE Tablet BY MOUTH DAILY Yes Alvin Bolanos MD   Omega-3 1000 MG CAPS Take 1 capsule by mouth daily Yes Historical Provider, MD   Miconazole Nitrate Applicator (MONISTAT 7 COMBO PACK FELISHA) 100 & 2 MG-% (9GM) KIT Apply daily Yes Alvin Bolanos MD   carboxymethylcellulose 1 %

## 2023-09-07 NOTE — TELEPHONE ENCOUNTER
Pt saw Katina Has today and was told to come back to see rogers in 2 weeks, but rogers's next available wasn't until oct 9th. Want to make sure that is okay to schedule patient for that date or if she needs to be squeezed in sooner,please call patient and let her know.

## 2023-09-21 ENCOUNTER — TELEPHONE (OUTPATIENT)
Dept: FAMILY MEDICINE CLINIC | Age: 83
End: 2023-09-21

## 2023-09-21 DIAGNOSIS — L30.9 DERMATITIS: Primary | ICD-10-CM

## 2023-09-21 RX ORDER — BETAMETHASONE DIPROPIONATE 0.5 MG/G
CREAM TOPICAL
Qty: 45 G | Refills: 0 | Status: SHIPPED | OUTPATIENT
Start: 2023-09-21

## 2023-09-21 NOTE — TELEPHONE ENCOUNTER
----- Message from Klaudia Atkins sent at 9/21/2023  9:55 AM EDT -----  Subject: Medication Problem    Medication: betamethasone dipropionate 0.05 % ointment  Dosage: Apply topically daily. Ordering Provider: vicente    Question/Problem: Winnie Prieto wants to inform Marlin Spears that when   she uses this and wears certain materials that it sticks to her and is   annoying. It does help with the itch but she is requesting if maybe it can   sent to pharmacy in a cream form. Please f/u with her to advise. Pharmacy: [facility] [phone]   M[place] Martinez [phone]    ---------------------------------------------------------------------------  --------------  Kelly SILVA  [phone]; OK to leave message on voicemail  ---------------------------------------------------------------------------  --------------    SCRIPT ANSWERS  Relationship to Patient: Self

## 2023-10-04 DIAGNOSIS — L30.9 DERMATITIS: ICD-10-CM

## 2023-10-04 RX ORDER — BETAMETHASONE DIPROPIONATE 0.5 MG/G
CREAM TOPICAL
Qty: 45 G | Refills: 0 | Status: SHIPPED | OUTPATIENT
Start: 2023-10-04

## 2023-10-04 NOTE — TELEPHONE ENCOUNTER
Medication:   Requested Prescriptions     Pending Prescriptions Disp Refills    betamethasone dipropionate 0.05 % cream 45 g 0     Sig: Apply topically 2 times daily.         Last Filled:  45 g x0 RF 9/21/23    Patient Phone Number: [phone] (home)     Last appt: 9/7/2023   Next appt: 10/9/2023    Last OARRS:        No data to display

## 2023-10-09 ENCOUNTER — OFFICE VISIT (OUTPATIENT)
Dept: FAMILY MEDICINE CLINIC | Age: 83
End: 2023-10-09

## 2023-10-09 VITALS
HEART RATE: 87 BPM | WEIGHT: 159 LBS | OXYGEN SATURATION: 97 % | HEIGHT: 62 IN | SYSTOLIC BLOOD PRESSURE: 128 MMHG | BODY MASS INDEX: 29.26 KG/M2 | DIASTOLIC BLOOD PRESSURE: 74 MMHG

## 2023-10-09 DIAGNOSIS — L30.9 DERMATITIS: Primary | ICD-10-CM

## 2023-10-09 RX ORDER — METHYLPREDNISOLONE 4 MG/1
TABLET ORAL
Qty: 1 KIT | Refills: 0 | Status: SHIPPED | OUTPATIENT
Start: 2023-10-09 | End: 2023-10-15

## 2023-10-09 NOTE — PROGRESS NOTES
Rio Chairez (:  1940) is a 82 y.o. female,Established patient, here for evaluation of the following chief complaint(s):  Rash         ASSESSMENT/PLAN:  1. Dermatitis  -     methylPREDNISolone (MEDROL, MEAGAN,) 4 MG tablet; Take with food as directed on packaging., Disp-1 kit, R-0Normal  Betamethasone was sent to the pharmacy last week. Return if symptoms worsen or fail to improve. Subjective   SUBJECTIVE/OBJECTIVE:  HPI  Follow up on rash on buttocks and other areas. Better now. Dermatologist also gave her a MDP which also helped. She states she has some residual and would like another MDP to mop up  Review of Systems   Constitutional: Negative. Respiratory: Negative. Cardiovascular: Negative. Gastrointestinal: Negative. Endocrine: Negative. Genitourinary: Negative. Skin:  Positive for rash (per HPI). Neurological: Negative. Psychiatric/Behavioral: Negative. Objective   Physical Exam  Constitutional:       General: She is not in acute distress. Appearance: She is not ill-appearing, toxic-appearing or diaphoretic. HENT:      Head: Normocephalic and atraumatic. Eyes:      Conjunctiva/sclera: Conjunctivae normal.   Skin:     General: Skin is warm and dry. Neurological:      Mental Status: She is alert and oriented to person, place, and time. Psychiatric:         Mood and Affect: Mood normal.         Behavior: Behavior normal.         Thought Content: Thought content normal.         Judgment: Judgment normal.                  An electronic signature was used to authenticate this note.     --Fish Quigley MD

## 2023-10-10 ASSESSMENT — ENCOUNTER SYMPTOMS
GASTROINTESTINAL NEGATIVE: 1
RESPIRATORY NEGATIVE: 1

## 2023-10-23 DIAGNOSIS — L30.9 DERMATITIS: ICD-10-CM

## 2023-10-23 RX ORDER — BETAMETHASONE DIPROPIONATE 0.5 MG/G
CREAM TOPICAL
Qty: 45 G | Refills: 0 | Status: SHIPPED | OUTPATIENT
Start: 2023-10-23

## 2023-10-23 NOTE — TELEPHONE ENCOUNTER
----- Message from Eduarda Niraj sent at 10/23/2023  2:26 PM EDT -----  Subject: Refill Request    QUESTIONS  Name of Medication? betamethasone dipropionate 0.05 % cream  Patient-reported dosage and instructions? 0.05%   How many days do you have left? 0  Preferred Pharmacy? [facility] phone number (if available)? [phone]  Additional Information for Provider? Patient states she only has rash on   her hairline on the right side. All other areas have healed. ---------------------------------------------------------------------------  --------------  Kelly SILVA  What is the best way for the office to contact you? OK to leave message on   voicemail  Preferred Call Back Phone Number? [phone]  ---------------------------------------------------------------------------  --------------  SCRIPT ANSWERS  Relationship to Patient?  Self

## 2023-10-30 DIAGNOSIS — I10 ESSENTIAL HYPERTENSION, BENIGN: ICD-10-CM

## 2023-10-30 RX ORDER — OLMESARTAN MEDOXOMIL 20 MG/1
20 TABLET ORAL DAILY
Qty: 90 TABLET | Refills: 3 | Status: SHIPPED | OUTPATIENT
Start: 2023-10-30

## 2023-11-02 ENCOUNTER — TELEPHONE (OUTPATIENT)
Dept: FAMILY MEDICINE CLINIC | Age: 83
End: 2023-11-02

## 2023-11-02 NOTE — TELEPHONE ENCOUNTER
[facility] called with clinical information     Prior expires today at noon     For the medication olmesartan (BENICAR) 20 MG tablet [[id number]]     Call back number prior auth [phone]

## 2023-11-02 NOTE — TELEPHONE ENCOUNTER
Submitted PA for Olmesartan Medoxomil 20MG tablets    Via CMM Key: [id number] STATUS: APPROVED    Approved today  SQARFO:[id number];QFENXK:[id number]; Review Type:Prior Auth; Coverage Start Date:10/03/2023; Coverage End Date:11/01/2024; Follow up done daily; if no response in three days we will refax for status check. If another three days goes by with no response we will call the insurance for status.

## 2023-11-02 NOTE — TELEPHONE ENCOUNTER
See if you can help her however there is not really much I can answer on questions.  She is on olmesartan only because she does not want an alternative not an alternative would not be indicated to at least try.

## 2023-11-02 NOTE — TELEPHONE ENCOUNTER
Patient states she is having issues getting Benicar approved  According to pharmacy Dr. Howard needs to answer questions re: approval for the medication  Patient has been paying out of pocket and it is expensive  Patient is asking for the status of the RX

## 2023-11-03 ENCOUNTER — TELEPHONE (OUTPATIENT)
Dept: FAMILY MEDICINE CLINIC | Age: 83
End: 2023-11-03

## 2023-11-03 RX ORDER — METHYLPREDNISOLONE 4 MG/1
TABLET ORAL
Qty: 1 KIT | Refills: 0 | Status: SHIPPED | OUTPATIENT
Start: 2023-11-03 | End: 2023-11-09

## 2023-11-03 NOTE — TELEPHONE ENCOUNTER
Medication and Quantity requested:      methylPREDNISolone (MEDROL, MEAGAN,) 4 MG tablet [[id number]]     Last Visit    10/09/2023    Pharmacy and phone number updated in EPIC:  yes          Mik Cole

## 2023-11-28 DIAGNOSIS — L30.9 DERMATITIS: ICD-10-CM

## 2023-11-28 RX ORDER — METHYLPREDNISOLONE 4 MG/1
TABLET ORAL
OUTPATIENT
Start: 2023-11-28 | End: 2023-12-04

## 2023-11-28 RX ORDER — BETAMETHASONE DIPROPIONATE 0.5 MG/G
CREAM TOPICAL
Qty: 45 G | Refills: 0 | Status: SHIPPED | OUTPATIENT
Start: 2023-11-28

## 2023-11-28 NOTE — TELEPHONE ENCOUNTER
Medication and Quantity requested:      betamethasone dipropionate 0.05 % cream [[id number]]     and    methylPREDNISolone (MEDROL, MEAGAN,) 4 MG tablet [[id number]]  ENDED     Last Visit  \09/07/2023      Pharmacy and phone number updated in EPIC:  yes              Rash has come back on her thigh and underbelly button  so she needs more cream     She also said that the prednisolone worked and needs more

## 2023-12-29 ENCOUNTER — TELEPHONE (OUTPATIENT)
Dept: FAMILY MEDICINE CLINIC | Age: 83
End: 2023-12-29

## 2023-12-29 NOTE — TELEPHONE ENCOUNTER
She will need to find out what they will cover in place of the Benicar and we can switch to that medication if it is equal acceptable.  She is on Lescol most likely because she has had issues with other statins.  Lescol is the 1 that is used when people cannot take the other statins therefore I am not sure there is anything we will be able to switch to

## 2023-12-29 NOTE — TELEPHONE ENCOUNTER
PANCHO   called and stated that  her new prescription coverage [employer] will not cover her Benicar and Lescol. Please advise

## 2024-01-18 RX ORDER — METHYLPREDNISOLONE 4 MG/1
TABLET ORAL
Qty: 1 TABLET | Refills: 0 | OUTPATIENT
Start: 2024-01-18

## 2024-01-18 RX ORDER — METHYLPREDNISOLONE 4 MG/1
TABLET ORAL
Qty: 1 KIT | Refills: 0 | Status: SHIPPED | OUTPATIENT
Start: 2024-01-18 | End: 2024-01-24

## 2024-01-18 RX ORDER — METHYLPREDNISOLONE 4 MG/1
TABLET ORAL
OUTPATIENT
Start: 2024-01-18 | End: 2024-01-24

## 2024-01-18 NOTE — TELEPHONE ENCOUNTER
Medication:   Requested Prescriptions     Pending Prescriptions Disp Refills    methylPREDNISolone (MEDROL DOSEPACK) 4 MG tablet [Pharmacy Med Name: methylprednisolone 4 mg tablets in a dose pack] 1 tablet 0     Sig: USE AS DIRECTED ON package        Last Filled:      Patient Phone Number: [phone] (home)     Last appt: 10/9/2023   Next appt: 1/18/2024    Last OARRS:        No data to display

## 2024-01-18 NOTE — TELEPHONE ENCOUNTER
Medication:   Requested Prescriptions     Pending Prescriptions Disp Refills    methylPREDNISolone (MEDROL DOSEPACK) 4 MG tablet       Sig: Take by mouth.        Last Filled:      Patient Phone Number: [phone] (home)     Last appt: 10/9/2023   Next appt: Visit date not found    Last OARRS:        No data to display

## 2024-01-23 NOTE — TELEPHONE ENCOUNTER
Medication and Quantity requested: betamethasone dipropionate 0.05 % cream       Last Visit  9/7/2023    Pharmacy and phone number updated in [facility]:  yes Patient presents approximately 2 weeks status post tarsal tunnel release right foot.  She is doing very well.  Mild discomfort.  Incision healing well with no signs of infection or dehiscence.  Neurovascular status intact.  Continue postoperative instructions and follow-up in 1 week.

## 2024-01-24 ENCOUNTER — TELEPHONE (OUTPATIENT)
Dept: FAMILY MEDICINE CLINIC | Age: 84
End: 2024-01-24

## 2024-01-24 NOTE — TELEPHONE ENCOUNTER
----- Message from Jaelyn Bautista sent at 1/24/2024  3:14 PM EST -----  Subject: Message to Provider    QUESTIONS  Information for Provider? Patient is getting a Epidural scheduled at   [facility] & she wants to know if she can go off of the apixaban   (ELIQUIS) 5 MG TABS for 3 days prior to the injection. Please call &   advise?   ---------------------------------------------------------------------------  --------------  CALL BACK INFO  [phone]; OK to leave message on voicemail  ---------------------------------------------------------------------------  --------------  SCRIPT ANSWERS  Relationship to Patient? Self

## 2024-01-30 DIAGNOSIS — L30.9 DERMATITIS: ICD-10-CM

## 2024-01-30 RX ORDER — BETAMETHASONE DIPROPIONATE 0.5 MG/G
CREAM TOPICAL
Qty: 45 G | Refills: 3 | Status: SHIPPED | OUTPATIENT
Start: 2024-01-30

## 2024-01-30 NOTE — TELEPHONE ENCOUNTER
Medication and Quantity requested: betamethasone dipropionate 0.05 % cream [[id number]]      Last Visit  10/9/23    Pharmacy and phone number updated in EPIC:  yes

## 2024-01-30 NOTE — TELEPHONE ENCOUNTER
Medication:   Requested Prescriptions     Pending Prescriptions Disp Refills    betamethasone dipropionate 0.05 % cream 45 g 0     Sig: Apply topically 2 times daily.        Last Filled:  11/28/2023, 45g, 0    Patient Phone Number: [phone] (home)     Last appt: 10/9/2023   Next appt: Visit date not found    Last OARRS:        No data to display

## 2024-01-30 NOTE — TELEPHONE ENCOUNTER
----- Message from Adamaris Delaney sent at 1/30/2024 11:15 AM EST -----  Subject: Refill Request    QUESTIONS  Name of Medication? betamethasone dipropionate 0.05 % cream  Patient-reported dosage and instructions? as needed  How many days do you have left? 0  Preferred Pharmacy? [facility]  Pharmacy phone number (if available)? [phone]  Additional Information for Provider? Patient would like to know if this   can be refilled so she has it if the issue comes back again.  ---------------------------------------------------------------------------  --------------  CALL BACK INFO  What is the best way for the office to contact you? OK to leave message on   voicemail  Preferred Call Back Phone Number? [phone]  ---------------------------------------------------------------------------  --------------  SCRIPT ANSWERS  Relationship to Patient? Self

## 2024-01-30 NOTE — TELEPHONE ENCOUNTER
Medication:   Requested Prescriptions     Pending Prescriptions Disp Refills    betamethasone dipropionate 0.05 % cream 45 g 0     Sig: Apply topically 2 times daily.        Last Filled:  11/28/2023, 45g, 0     Patient Phone Number: [phone] (home)     Last appt: 10/9/2023   Next appt: Visit date not found    Last OARRS:        No data to display

## 2024-02-05 DIAGNOSIS — K21.9 GASTROESOPHAGEAL REFLUX DISEASE: ICD-10-CM

## 2024-02-05 RX ORDER — OMEPRAZOLE 20 MG/1
CAPSULE, DELAYED RELEASE ORAL
Qty: 30 CAPSULE | Refills: 5 | Status: SHIPPED | OUTPATIENT
Start: 2024-02-05

## 2024-02-05 NOTE — TELEPHONE ENCOUNTER
Medication:   Requested Prescriptions     Pending Prescriptions Disp Refills    omeprazole (PRILOSEC) 20 MG delayed release capsule [Pharmacy Med Name: omeprazole 20 mg capsule,delayed release] 30 capsule 5     Sig: TAKE ONE Capsule BY MOUTH EVERY MORNING        Last Filled:  8/21/2023    Patient Phone Number: [phone] (home)     Last appt: 10/9/2023   Next appt: Visit date not found    Last OARRS:        No data to display

## 2024-02-22 DIAGNOSIS — M79.89 LEG SWELLING: ICD-10-CM

## 2024-02-22 RX ORDER — FUROSEMIDE 20 MG/1
TABLET ORAL
Qty: 30 TABLET | Refills: 2 | Status: SHIPPED | OUTPATIENT
Start: 2024-02-22

## 2024-02-22 NOTE — TELEPHONE ENCOUNTER
Medication:   Requested Prescriptions     Pending Prescriptions Disp Refills    furosemide (LASIX) 20 MG tablet [Pharmacy Med Name: furosemide 20 mg tablet] 30 tablet 0     Sig: TAKE ONE TABLET BY MOUTH DAILY AS NEEDED        Last Filled:  3/31/2023    Patient Phone Number: [phone] (home)     Last appt: 10/9/2023   Next appt: Visit date not found    Last OARRS:        No data to display

## 2024-02-27 ENCOUNTER — TELEPHONE (OUTPATIENT)
Dept: FAMILY MEDICINE CLINIC | Age: 84
End: 2024-02-27

## 2024-02-27 NOTE — TELEPHONE ENCOUNTER
They will not cover tradename medications which is what Benicar is.  I am sure they will cover olmesartan which is the same thing as Benicar just generic.  Does she want us to send olmesartan to [facility].  If so li chhaya Carbajal should be working on the PA for the Jean

## 2024-02-27 NOTE — TELEPHONE ENCOUNTER
Call to [employer] started an appeal it can only be processed via telephone  Service requires mre review by a pharmacist   Case number: [id number]

## 2024-02-27 NOTE — TELEPHONE ENCOUNTER
----- Message from Niall Rodríguez sent at 2/27/2024 12:10 PM EST -----  Subject: Refill Request    QUESTIONS  Name of Medication? olmesartan (BENICAR) 20 MG tablet  Patient-reported dosage and instructions? 20 MG Take 1 tablet by mouth   daily  How many days do you have left? 6  Preferred Pharmacy? [facility]  Pharmacy phone number (if available)? [phone]  Additional Information for Provider? Patient mentioned that Medicare   [facility] has been trying to contact the office regarding her   medication. They had sent faxes over to the office. They gave a number to   call back [phone]. Patient was told they would not cover Benecar   but would reconsider if they spoke with the PCP. Patient is also hoping to   stay on her LESCOL medication.   ---------------------------------------------------------------------------  --------------  CALL BACK INFO  What is the best way for the office to contact you? OK to leave message on   voicemail  Preferred Call Back Phone Number? [phone]  ---------------------------------------------------------------------------  --------------  SCRIPT ANSWERS  Relationship to Patient? Self

## 2024-03-01 DIAGNOSIS — I82.452 DEEP VENOUS THROMBOSIS (DVT) OF LEFT PERONEAL VEIN, UNSPECIFIED CHRONICITY (HCC): ICD-10-CM

## 2024-03-01 RX ORDER — APIXABAN 5 MG/1
5 TABLET, FILM COATED ORAL 2 TIMES DAILY
Qty: 60 TABLET | Refills: 5 | Status: SHIPPED | OUTPATIENT
Start: 2024-03-01

## 2024-03-01 NOTE — TELEPHONE ENCOUNTER
Medication:   Requested Prescriptions     Pending Prescriptions Disp Refills    ELIQUIS 5 MG TABS tablet [Pharmacy Med Name: Eliquis 5 mg tablet] 60 tablet 5     Sig: TAKE ONE Tablet BY MOUTH TWICE DAILY        Last Filled:  08/15/23 60 tablet 5 refills     Patient Phone Number: [phone] (home)     Last appt: 10/9/2023   Next appt: Visit date not found    Last OARRS:        No data to display

## 2024-03-06 ENCOUNTER — TELEPHONE (OUTPATIENT)
Dept: FAMILY MEDICINE CLINIC | Age: 84
End: 2024-03-06

## 2024-03-06 NOTE — TELEPHONE ENCOUNTER
Find out from patient if she is unable to tolerate any other statins.  It appears she took atorvastatin in the past.  If she was unable to tolerate the atorvastatin that is the only possible reason we can get for them to cover this otherwise it is not on the formulary  See me to discuss before calling her

## 2024-03-06 NOTE — TELEPHONE ENCOUNTER
----- Message from Jovana Garvin sent at 3/1/2024 12:52 PM EST -----  Subject: Medication Problem     Medication: LESCOL XL 80 MG extended release tablet  Dosage: 1 x a day  Ordering Provider:     Question/Problem: Pt said she got a letter from her insurance. Pt said she   was told they have not received a call from Dr. Howard regarding this   medication. They denied her request to get this rx covered under her r/x.   Pt said she wants to make sure her pcp called about this so she can get   this refilled. Please call her back to help with this. Pt said she picked   up the medication a few days ago and it was $78 she wants to make sure is   that right or was the letter an error.       Pharmacy: [facility] [address] [phone]   - F [phone]    ---------------------------------------------------------------------------  --------------  CALL BACK INFO  [phone]; OK to leave message on voicemail  ---------------------------------------------------------------------------  --------------    SCRIPT ANSWERS  Relationship to Patient: Self

## 2024-03-08 NOTE — TELEPHONE ENCOUNTER
Called patient called and said that she would prefer to take Lescol XL 80 mg tabs    She really likes this medication       She said the insurance company needs a letter and they will approve this medication

## 2024-03-08 NOTE — TELEPHONE ENCOUNTER
You can write a letter and states that she prefers to take this however her preference is not usually a reason to approve it for an insurance company when there are other alternatives available that she has not tried.  See  me

## 2024-03-15 ENCOUNTER — TELEPHONE (OUTPATIENT)
Dept: FAMILY MEDICINE CLINIC | Age: 84
End: 2024-03-15

## 2024-03-15 NOTE — TELEPHONE ENCOUNTER
Write letter or may call to her insurance company stating that she did not tolerate atorvastatin but does tolerate Lescol is the reason we are requesting her to stay on this.  Let her know all we can do is write the letter but it is ultimately up to the insurance company whether they will cover it or not since it is a noncovered medication

## 2024-03-15 NOTE — TELEPHONE ENCOUNTER
Patient new insurance called [employer] do not want to pay the current medication below that she has taken for years.    Patient said [employer] need a letter of explanation as to why patient has to continue to take Lescol and to fax to [phone].    LESCOL XL 80 MG extended release tablet [[id number]]     Patient says Thank you.

## 2024-03-15 NOTE — TELEPHONE ENCOUNTER
It appears she previously took atorvastatin.  Did she have issues with that.  Otherwise we have nothing to appeal to have her continuing Lescol.

## 2024-03-18 NOTE — TELEPHONE ENCOUNTER
Any questions patient said to call [facility] [phone]

## 2024-03-19 NOTE — TELEPHONE ENCOUNTER
Sent to medicare [employer] initiating member id #[id number]  Provided clinical questions  to patient insurance redetermination takes 7 days  Case number: [id number]

## 2024-04-01 DIAGNOSIS — L30.9 DERMATITIS: ICD-10-CM

## 2024-04-02 RX ORDER — BETAMETHASONE DIPROPIONATE 0.05 %
OINTMENT (GRAM) TOPICAL
Qty: 45 G | Refills: 3 | Status: SHIPPED | OUTPATIENT
Start: 2024-04-02

## 2024-04-02 NOTE — TELEPHONE ENCOUNTER
Medication:   Requested Prescriptions     Pending Prescriptions Disp Refills    betamethasone dipropionate 0.05 % ointment [Pharmacy Med Name: betamethasone dipropionate 0.05 % topical ointment] 45 g 3     Sig: APPLY TO THE AFFECTED AREA(S) to the skin (topically) DAILY        Last Filled:  01/30/2024    Patient Phone Number: [phone] (home)     Last appt: 10/9/2023   Next appt: Visit date not found    Last OARRS:        No data to display

## 2024-04-05 ENCOUNTER — OFFICE VISIT (OUTPATIENT)
Dept: FAMILY MEDICINE CLINIC | Age: 84
End: 2024-04-05

## 2024-04-05 VITALS
SYSTOLIC BLOOD PRESSURE: 120 MMHG | OXYGEN SATURATION: 98 % | DIASTOLIC BLOOD PRESSURE: 60 MMHG | BODY MASS INDEX: 29.26 KG/M2 | HEART RATE: 68 BPM | WEIGHT: 160 LBS

## 2024-04-05 DIAGNOSIS — M26.629 TMJ SYNDROME: Primary | ICD-10-CM

## 2024-04-05 RX ORDER — HYDROCODONE BITARTRATE AND ACETAMINOPHEN 5; 325 MG/1; MG/1
1 TABLET ORAL EVERY 6 HOURS PRN
Qty: 20 TABLET | Refills: 0 | Status: SHIPPED | OUTPATIENT
Start: 2024-04-05 | End: 2024-04-10

## 2024-04-05 RX ORDER — METHYLPREDNISOLONE 4 MG/1
TABLET ORAL
Qty: 1 KIT | Refills: 0 | Status: SHIPPED | OUTPATIENT
Start: 2024-04-05 | End: 2024-04-11

## 2024-04-05 SDOH — ECONOMIC STABILITY: FOOD INSECURITY: WITHIN THE PAST 12 MONTHS, YOU WORRIED THAT YOUR FOOD WOULD RUN OUT BEFORE YOU GOT MONEY TO BUY MORE.: NEVER TRUE

## 2024-04-05 SDOH — ECONOMIC STABILITY: INCOME INSECURITY: HOW HARD IS IT FOR YOU TO PAY FOR THE VERY BASICS LIKE FOOD, HOUSING, MEDICAL CARE, AND HEATING?: NOT HARD AT ALL

## 2024-04-05 SDOH — ECONOMIC STABILITY: FOOD INSECURITY: WITHIN THE PAST 12 MONTHS, THE FOOD YOU BOUGHT JUST DIDN'T LAST AND YOU DIDN'T HAVE MONEY TO GET MORE.: NEVER TRUE

## 2024-04-05 ASSESSMENT — ENCOUNTER SYMPTOMS
GASTROINTESTINAL NEGATIVE: 1
RESPIRATORY NEGATIVE: 1

## 2024-04-05 ASSESSMENT — PATIENT HEALTH QUESTIONNAIRE - PHQ9
SUM OF ALL RESPONSES TO PHQ QUESTIONS 1-9: 0
1. LITTLE INTEREST OR PLEASURE IN DOING THINGS: NOT AT ALL
SUM OF ALL RESPONSES TO PHQ QUESTIONS 1-9: 0
2. FEELING DOWN, DEPRESSED OR HOPELESS: NOT AT ALL
SUM OF ALL RESPONSES TO PHQ9 QUESTIONS 1 & 2: 0

## 2024-04-05 NOTE — PROGRESS NOTES
Tami North (:  1940) is a 84 y.o. female,Established patient, here for evaluation of the following chief complaint(s):  Jaw Pain (Her Lower jaw has been hurting since Monday )         ASSESSMENT/PLAN:  1. TMJ syndrome  -     methylPREDNISolone (MEDROL, MEAGAN,) 4 MG tablet; Take with food as directed on packaging., Disp-1 kit, R-0Normal  -     HYDROcodone-acetaminophen (NORCO) 5-325 MG per tablet; Take 1 tablet by mouth every 6 hours as needed for Pain for up to 5 days. Intended supply: 5 days. Take lowest dose possible to manage pain Max Daily Amount: 4 tablets, Disp-20 tablet, R-0Normal      Return if symptoms worsen or fail to improve.         Subjective   SUBJECTIVE/OBJECTIVE:  HPI  Pain when she opens her mouth on L jaw. Took some left over vicodin.  She saw Oral surgeon and exam and xrays were negative   No etiology.  Sudden onset ie she had it when she awoke .  Somewhat hard for patient to localize pain i.e. lower jaw versus TMJ  She states she does grind her teeth at night.    Review of Systems   Constitutional: Negative.    HENT:          Pain when she opens her mouth   Respiratory: Negative.     Cardiovascular: Negative.    Gastrointestinal: Negative.    Endocrine: Negative.    Genitourinary: Negative.    Musculoskeletal: Negative.    Neurological: Negative.           Objective   Physical Exam  Constitutional:       General: She is not in acute distress.     Appearance: She is not ill-appearing, toxic-appearing or diaphoretic.   HENT:      Head: Normocephalic and atraumatic.     Eyes:      Conjunctiva/sclera: Conjunctivae normal.   Skin:     General: Skin is warm and dry.   Neurological:      Mental Status: She is alert and oriented to person, place, and time.   Psychiatric:         Mood and Affect: Mood normal.         Behavior: Behavior normal.         Thought Content: Thought content normal.         Judgment: Judgment normal.                  An electronic signature was used to

## 2024-04-15 NOTE — TELEPHONE ENCOUNTER
Medication and Quantity requested: BENICAR 20 MG tablet [[id number]       Last Visit  10/9/23    Pharmacy and phone number updated in EPIC:  yes    PT HAS BEEN OUT OR MEDICATION FOR A WEEK
Medication:   Requested Prescriptions     Pending Prescriptions Disp Refills    olmesartan (BENICAR) 20 MG tablet 90 tablet 3     Sig: Take 1 tablet by mouth daily        Last Filled:  90 x 3 RF 3/3/23    Patient Phone Number: [phone] (home)     Last appt: 10/9/2023   Next appt: Visit date not found    Last OARRS:        No data to display
PACU

## 2024-04-17 ENCOUNTER — TELEPHONE (OUTPATIENT)
Dept: FAMILY MEDICINE CLINIC | Age: 84
End: 2024-04-17

## 2024-04-17 DIAGNOSIS — M26.629 TMJ SYNDROME: ICD-10-CM

## 2024-04-17 RX ORDER — METHYLPREDNISOLONE 4 MG/1
TABLET ORAL
Qty: 1 KIT | Refills: 0 | Status: SHIPPED | OUTPATIENT
Start: 2024-04-17 | End: 2024-04-23

## 2024-04-17 NOTE — TELEPHONE ENCOUNTER
Medication and Quantity requested: methylPREDNISolone (MEDROL, MEAGAN,) 4 MG tablet [[id number]]      Last Visit  4/5/24    Pharmacy and phone number updated in EPIC:  yes    Pt stated that her jaw is starting to feel better but she needs another refill to help it completely make it better    Send to [facility] pharmacy on [address]    Pt said thank you

## 2024-04-30 ENCOUNTER — TELEPHONE (OUTPATIENT)
Dept: FAMILY MEDICINE CLINIC | Age: 84
End: 2024-04-30

## 2024-04-30 NOTE — TELEPHONE ENCOUNTER
Medication and Quantity requested:   ECC Message to Provider  Received: Today  [facility] Practice Support  ECC Message to Provider    Relationship to Patient: Self    Additional Information patient is requesting  another packet of pill methyl preenisolone 4mg, as her pain in the jaw came back yesterday.    --------------------------------------------------------------------------------------------------------------------------    Call Back Information: OK to leave message on voicemail  Preferred Call Back Number: Phone [phone]     Last Visit    04/05/2024  Pharmacy and phone number updated in EPIC:  yes    [facility]        Patient said thank you and she really appreciates Dr Howard

## 2024-04-30 NOTE — TELEPHONE ENCOUNTER
Patient has already had 2 Dosepaks in the last 3 weeks.  She should not get another one  She needs to see an ENT or oral surgeon to determine what is going on with her jaw.

## 2024-05-03 PROBLEM — I12.9 HYPERTENSIVE KIDNEY DISEASE WITH STAGE 3B CHRONIC KIDNEY DISEASE (HCC): Status: ACTIVE | Noted: 2024-05-03

## 2024-05-03 PROBLEM — N18.32 HYPERTENSIVE KIDNEY DISEASE WITH STAGE 3B CHRONIC KIDNEY DISEASE (HCC): Status: ACTIVE | Noted: 2024-05-03

## 2024-05-13 ENCOUNTER — TELEPHONE (OUTPATIENT)
Dept: FAMILY MEDICINE CLINIC | Age: 84
End: 2024-05-13

## 2024-05-13 DIAGNOSIS — E78.5 HYPERLIPIDEMIA, UNSPECIFIED HYPERLIPIDEMIA TYPE: ICD-10-CM

## 2024-05-13 NOTE — TELEPHONE ENCOUNTER
Generic is okay.  It is the same chemical as the name brand but since they are no longer making the name brand she really does not have any choice if she wants to remain on the Lescol.

## 2024-05-13 NOTE — TELEPHONE ENCOUNTER
Medication:   Requested Prescriptions     Pending Prescriptions Disp Refills    LESCOL XL 80 MG extended release tablet 90 tablet 3     Sig: Take 1 tablet by mouth daily        Last Filled:  05/03/2023, 90, 3    Patient Phone Number: [phone] (home)     Last appt: 4/5/2024   Next appt: Visit date not found    Last OARRS:        No data to display

## 2024-05-13 NOTE — TELEPHONE ENCOUNTER
Patient called said the manufacture for   LESCOL XL 80 MG extended release tablet [[id number]]   Isn't making the name brand any longer and needs to know if generic would be alright to take or if she needs to go with something else     She needs to make sure there won't be any interactions if she changes to generic.     If generic is alright she will need a new script       [facility]

## 2024-05-14 RX ORDER — FLUVASTATIN SODIUM 80 MG/1
80 TABLET, FILM COATED, EXTENDED RELEASE ORAL DAILY
Qty: 90 TABLET | Refills: 3 | Status: SHIPPED | OUTPATIENT
Start: 2024-05-14

## 2024-06-20 ENCOUNTER — OFFICE VISIT (OUTPATIENT)
Dept: FAMILY MEDICINE CLINIC | Age: 84
End: 2024-06-20

## 2024-06-20 VITALS
SYSTOLIC BLOOD PRESSURE: 126 MMHG | OXYGEN SATURATION: 98 % | BODY MASS INDEX: 29.26 KG/M2 | HEART RATE: 75 BPM | DIASTOLIC BLOOD PRESSURE: 70 MMHG | WEIGHT: 160 LBS

## 2024-06-20 DIAGNOSIS — L30.9 DERMATITIS: Primary | ICD-10-CM

## 2024-06-20 RX ORDER — METHYLPREDNISOLONE 4 MG/1
TABLET ORAL
Qty: 1 KIT | Refills: 0 | Status: SHIPPED | OUTPATIENT
Start: 2024-06-20 | End: 2024-06-26

## 2024-06-20 ASSESSMENT — ENCOUNTER SYMPTOMS
RESPIRATORY NEGATIVE: 1
GASTROINTESTINAL NEGATIVE: 1

## 2024-06-20 NOTE — PROGRESS NOTES
Tami North (:  1940) is a 84 y.o. female,Established patient, here for evaluation of the following chief complaint(s):  Arm Injury (red spots on under arm - gets bigger as time goes by - 3 weeks ago/)      Assessment & Plan   1. Dermatitis  -     methylPREDNISolone (MEDROL, MEAGAN,) 4 MG tablet; Take with food as directed on packaging., Disp-1 kit, R-0Normal  Dermatologist if not better    Return if symptoms worsen or fail to improve.       Subjective   HPI  Breaking out on L arm xs 1 month. No symptoms ie itching, etc.  She thinks it seems to be spreading but there are only a few lesions noted.  She has no systemic symptoms.  She would also like her ears looked at although she is having no issues.    Review of Systems   Constitutional: Negative.    Respiratory: Negative.     Cardiovascular: Negative.    Gastrointestinal: Negative.    Endocrine: Negative.    Genitourinary: Negative.    Skin:  Positive for rash.   Neurological: Negative.           Objective   Physical Exam  Constitutional:       General: She is not in acute distress.     Appearance: She is not ill-appearing, toxic-appearing or diaphoretic.   HENT:      Head: Normocephalic and atraumatic.      Right Ear: Tympanic membrane, ear canal and external ear normal. There is no impacted cerumen.      Left Ear: Tympanic membrane, ear canal and external ear normal. There is no impacted cerumen.   Eyes:      Conjunctiva/sclera: Conjunctivae normal.   Cardiovascular:      Rate and Rhythm: Normal rate and regular rhythm.   Pulmonary:      Effort: Pulmonary effort is normal.      Breath sounds: Normal breath sounds. No wheezing or rales.   Skin:     General: Skin is warm and dry.      Findings: Rash (Few small macular lesions on her left arm) present.   Neurological:      Mental Status: She is alert and oriented to person, place, and time.   Psychiatric:         Mood and Affect: Mood normal.         Behavior: Behavior normal.         Thought

## 2024-06-21 DIAGNOSIS — K21.9 GASTROESOPHAGEAL REFLUX DISEASE: ICD-10-CM

## 2024-06-21 RX ORDER — OMEPRAZOLE 20 MG/1
CAPSULE, DELAYED RELEASE ORAL
Qty: 30 CAPSULE | Refills: 5 | Status: SHIPPED | OUTPATIENT
Start: 2024-06-21

## 2024-06-21 NOTE — TELEPHONE ENCOUNTER
Last OV: 6/20/2024  Next OV: Visit date not found        Last fill:2/5/2024  Refills:30 ct 5 refills

## 2024-06-27 ENCOUNTER — TELEPHONE (OUTPATIENT)
Dept: FAMILY MEDICINE CLINIC | Age: 84
End: 2024-06-27

## 2024-06-27 NOTE — TELEPHONE ENCOUNTER
betamethasone dipropionate 0.05 % cream     Pt is noticing a red rash on her cheeks.  She would like to know if this cream is ok to apply to her face?

## 2024-07-03 ENCOUNTER — TELEPHONE (OUTPATIENT)
Dept: FAMILY MEDICINE CLINIC | Age: 84
End: 2024-07-03

## 2024-07-03 DIAGNOSIS — L30.9 DERMATITIS: ICD-10-CM

## 2024-07-03 RX ORDER — BETAMETHASONE DIPROPIONATE 0.5 MG/G
CREAM TOPICAL
Qty: 45 G | Refills: 3 | Status: SHIPPED | OUTPATIENT
Start: 2024-07-03

## 2024-07-03 NOTE — TELEPHONE ENCOUNTER
Medication  betamethasone dipropionate 0.05 % ointment [1029]  betamethasone dipropionate 0.05 % ointment      Patient would like  a cream instead of it ointment if a new prescription can be called in or give a verbal order

## 2024-07-12 ENCOUNTER — TELEPHONE (OUTPATIENT)
Dept: FAMILY MEDICINE CLINIC | Age: 84
End: 2024-07-12

## 2024-07-12 NOTE — TELEPHONE ENCOUNTER
----- Message from Mik Branch sent at 7/12/2024  3:50 PM EDT -----  Regarding: ECC Message to Provider  ECC Message to Provider    Relationship to Patient: Self     Additional Information Patient wanted to inform her provider Dr. Howard that she will be having Epidermal shot on her back and if its okay to stop taking Eliquis for 3 days starting on Monday July 15 to July 18 Thursday which is scheduled for her shot.  --------------------------------------------------------------------------------------------------------------------------    Call Back Information: OK to leave message on voicemail  Preferred Call Back Number: Phone [phone] (home)

## 2024-07-25 ENCOUNTER — OFFICE VISIT (OUTPATIENT)
Dept: FAMILY MEDICINE CLINIC | Age: 84
End: 2024-07-25

## 2024-07-25 VITALS
HEART RATE: 76 BPM | DIASTOLIC BLOOD PRESSURE: 72 MMHG | OXYGEN SATURATION: 98 % | SYSTOLIC BLOOD PRESSURE: 118 MMHG | BODY MASS INDEX: 29.26 KG/M2 | WEIGHT: 160 LBS

## 2024-07-25 DIAGNOSIS — R58 ECCHYMOSIS: Primary | ICD-10-CM

## 2024-07-25 ASSESSMENT — ENCOUNTER SYMPTOMS
RESPIRATORY NEGATIVE: 1
ROS SKIN COMMENTS: PER HPI
GASTROINTESTINAL NEGATIVE: 1

## 2024-07-25 NOTE — PROGRESS NOTES
Tami North (:  1940) is a 84 y.o. female,Established patient, here for evaluation of the following chief complaint(s):  Skin Problem (red spot on both arms/)      Assessment & Plan   1. Ecchymosis  Patient reassured that this is not a significant issue but there is no treatment.    Return in about 1 month (around 2024) for AWV.       Subjective   HPI  Patient is concerned about spots on her arm.  They are asymptomatic.  Review of Systems   Constitutional: Negative.    Respiratory: Negative.     Cardiovascular: Negative.    Gastrointestinal: Negative.    Endocrine: Negative.    Genitourinary: Negative.    Skin:         Per HPI   Neurological: Negative.           Objective   Physical Exam  Constitutional:       General: She is not in acute distress.     Appearance: She is not ill-appearing, toxic-appearing or diaphoretic.   HENT:      Head: Normocephalic and atraumatic.   Eyes:      Conjunctiva/sclera: Conjunctivae normal.   Skin:     General: Skin is warm and dry.      Comments: Small ecchymosis on both arms consistent with being on Eliquis and sustaining minor trauma   Neurological:      Mental Status: She is alert and oriented to person, place, and time.   Psychiatric:         Mood and Affect: Mood normal.         Behavior: Behavior normal.         Thought Content: Thought content normal.         Judgment: Judgment normal.                  An electronic signature was used to authenticate this note.    --Hugo Howard MD

## 2024-08-13 DIAGNOSIS — I10 ESSENTIAL HYPERTENSION, BENIGN: Primary | ICD-10-CM

## 2024-08-13 DIAGNOSIS — I10 ESSENTIAL HYPERTENSION, BENIGN: ICD-10-CM

## 2024-08-13 DIAGNOSIS — E78.2 MIXED HYPERLIPIDEMIA: ICD-10-CM

## 2024-08-13 DIAGNOSIS — R73.01 IMPAIRED FASTING BLOOD SUGAR: ICD-10-CM

## 2024-08-13 LAB
ALBUMIN SERPL-MCNC: 4.2 G/DL (ref 3.4–5)
ALP SERPL-CCNC: 103 U/L (ref 40–129)
ALT SERPL-CCNC: 12 U/L (ref 10–40)
AST SERPL-CCNC: 22 U/L (ref 15–37)
BILIRUB DIRECT SERPL-MCNC: <0.2 MG/DL (ref 0–0.3)
BILIRUB INDIRECT SERPL-MCNC: NORMAL MG/DL (ref 0–1)
BILIRUB SERPL-MCNC: 0.5 MG/DL (ref 0–1)
CHOLEST SERPL-MCNC: 155 MG/DL (ref 0–199)
EST. AVERAGE GLUCOSE BLD GHB EST-MCNC: 119.8 MG/DL
HBA1C MFR BLD: 5.8 %
HDLC SERPL-MCNC: 60 MG/DL (ref 40–60)
LDLC SERPL CALC-MCNC: 69 MG/DL
PROT SERPL-MCNC: 6.4 G/DL (ref 6.4–8.2)
TRIGL SERPL-MCNC: 130 MG/DL (ref 0–150)
TSH SERPL DL<=0.005 MIU/L-ACNC: 2.94 UIU/ML (ref 0.27–4.2)
VLDLC SERPL CALC-MCNC: 26 MG/DL

## 2024-08-22 DIAGNOSIS — I10 ESSENTIAL HYPERTENSION, BENIGN: ICD-10-CM

## 2024-08-22 RX ORDER — OLMESARTAN MEDOXOMIL 20 MG/1
20 TABLET, FILM COATED ORAL DAILY
Qty: 90 TABLET | Refills: 3 | Status: SHIPPED | OUTPATIENT
Start: 2024-08-22

## 2024-08-22 NOTE — TELEPHONE ENCOUNTER
Medication:   Requested Prescriptions     Pending Prescriptions Disp Refills    BENICAR 20 MG tablet [Pharmacy Med Name: Benicar 20 mg tablet] 90 tablet 3     Sig: TAKE ONE Tablet BY MOUTH EVERY DAY        Last Filled:  10/30/2023, 90, 3    Patient Phone Number: [phone] (home)     Last appt: 7/25/2024   Next appt: 9/3/2024    Last OARRS:        No data to display

## 2024-08-27 DIAGNOSIS — K64.4 HEMORRHOIDAL SKIN TAGS: ICD-10-CM

## 2024-08-27 RX ORDER — HYDROCORTISONE 25 MG/G
CREAM TOPICAL
Qty: 28 G | Refills: 5 | Status: SHIPPED | OUTPATIENT
Start: 2024-08-27

## 2024-08-27 NOTE — TELEPHONE ENCOUNTER
Medication:   Requested Prescriptions     Pending Prescriptions Disp Refills    hydrocortisone (PROCTO-MED HC) 2.5 % CREA rectal cream [Pharmacy Med Name: Procto-Med HC 2.5 % topical cream perineal applicator] 28 g 5     Sig: APPLY TO anal AREA TWICE DAILY AS NEEDED        Last Filled:  08/11/2023, 30 g, 5    Patient Phone Number: [phone] (home)     Last appt: 7/25/2024   Next appt: 9/3/2024    Last OARRS:        No data to display

## 2024-09-03 ENCOUNTER — OFFICE VISIT (OUTPATIENT)
Dept: FAMILY MEDICINE CLINIC | Age: 84
End: 2024-09-03

## 2024-09-03 VITALS
OXYGEN SATURATION: 98 % | BODY MASS INDEX: 29.59 KG/M2 | HEART RATE: 71 BPM | WEIGHT: 160.8 LBS | SYSTOLIC BLOOD PRESSURE: 132 MMHG | HEIGHT: 62 IN | DIASTOLIC BLOOD PRESSURE: 70 MMHG

## 2024-09-03 DIAGNOSIS — I82.532 CHRONIC DEEP VEIN THROMBOSIS (DVT) OF POPLITEAL VEIN OF LEFT LOWER EXTREMITY (HCC): ICD-10-CM

## 2024-09-03 DIAGNOSIS — K21.00 GASTROESOPHAGEAL REFLUX DISEASE WITH ESOPHAGITIS, UNSPECIFIED WHETHER HEMORRHAGE: ICD-10-CM

## 2024-09-03 DIAGNOSIS — I10 ESSENTIAL HYPERTENSION, BENIGN: ICD-10-CM

## 2024-09-03 DIAGNOSIS — E78.00 PURE HYPERCHOLESTEROLEMIA: ICD-10-CM

## 2024-09-03 DIAGNOSIS — Z00.00 MEDICARE ANNUAL WELLNESS VISIT, SUBSEQUENT: Primary | ICD-10-CM

## 2024-09-03 DIAGNOSIS — K51.40 PSEUDOPOLYPOSIS OF COLON WITHOUT COMPLICATION, UNSPECIFIED PART OF COLON (HCC): ICD-10-CM

## 2024-09-03 ASSESSMENT — PATIENT HEALTH QUESTIONNAIRE - PHQ9
SUM OF ALL RESPONSES TO PHQ QUESTIONS 1-9: 0
2. FEELING DOWN, DEPRESSED OR HOPELESS: NOT AT ALL
SUM OF ALL RESPONSES TO PHQ9 QUESTIONS 1 & 2: 0
SUM OF ALL RESPONSES TO PHQ QUESTIONS 1-9: 0
1. LITTLE INTEREST OR PLEASURE IN DOING THINGS: NOT AT ALL

## 2024-09-03 ASSESSMENT — LIFESTYLE VARIABLES
HOW OFTEN DO YOU HAVE A DRINK CONTAINING ALCOHOL: NEVER
HOW MANY STANDARD DRINKS CONTAINING ALCOHOL DO YOU HAVE ON A TYPICAL DAY: PATIENT DOES NOT DRINK

## 2024-09-03 NOTE — PROGRESS NOTES
Medicare Annual Wellness Visit    Tami North is here for Medicare AWV (No concerns today )    Assessment & Plan     Medicare annual wellness visit, subsequent  Return 1 year  Essential hypertension, benign  Stable/Controlled  Continue current treatment  Home BP checks  Return 3 months    Pseudopolyposis of colon without complication, unspecified part of colon (HCC)  Patient is due for colonoscopy in 2027  Chronic deep vein thrombosis (DVT) of popliteal vein of left lower extremity (HCC)  Patient remains on Elik  Gastroesophageal reflux disease with esophagitis, unspecified whether hemorrhage  Patient remains on omeprazole.  She is followed by GI  Pure hypercholesterolemia  Well-controlled with Lescol  Life Style Modification with diet,exercise, weight control      Recommendations for Preventive Services Due: see orders and patient instructions/AVS.  Recommended screening schedule for the next 5-10 years is provided to the patient in written form: see Patient Instructions/AVS.    Health Maintenance reviewed with the patient: Tdap, Shingrix, flu vaccine, COVID, and RSV recommended     No follow-ups on file.     Subjective   The following acute and/or chronic problems were also addressed today:  See A/P    Patient's complete Health Risk Assessment and screening values have been reviewed and are found in Flowsheets. The following problems were reviewed today and where indicated follow up appointments were made and/or referrals ordered.    No Positive Risk Factors identified today.            Objective   Vitals:    09/03/24 1538   BP: 132/70   Site: Left Upper Arm   Position: Sitting   Cuff Size: Medium Adult   Pulse: 71   SpO2: 98%   Weight: 72.9 kg (160 lb 12.8 oz)   Height: 1.575 m (5' 2\")      Body mass index is 29.41 kg/m².      Vitals:    09/03/24 1538   BP: 132/70   Site: Left Upper Arm   Position: Sitting   Cuff Size: Medium Adult   Pulse: 71   SpO2: 98%   Weight: 72.9 kg (160 lb 12.8 oz)   Height: 1.575

## 2024-09-03 NOTE — PATIENT INSTRUCTIONS
years to screen for glaucoma; cataracts, macular degeneration, and other eye disorders.  A preventive dental visit is recommended every 6 months.  Try to get at least 150 minutes of exercise per week or 10,000 steps per day on a pedometer .  Order or download the FREE \"Exercise & Physical Activity: Your Everyday Guide\" from [employer]. Call 1-343.834.1802 or search [employer] online.  You need 5321-9639 mg of calcium and 5808-0838 IU of vitamin D per day. It is possible to meet your calcium requirement with diet alone, but a vitamin D supplement is usually necessary to meet this goal.  When exposed to the sun, use a sunscreen that protects against both UVA and UVB radiation with an SPF of 30 or greater. Reapply every 2 to 3 hours or after sweating, drying off with a towel, or swimming.  Always wear a seat belt when traveling in a car. Always wear a helmet when riding a bicycle or motorcycle.

## 2024-09-05 DIAGNOSIS — I82.452 DEEP VENOUS THROMBOSIS (DVT) OF LEFT PERONEAL VEIN, UNSPECIFIED CHRONICITY (HCC): ICD-10-CM

## 2024-09-05 RX ORDER — APIXABAN 5 MG/1
5 TABLET, FILM COATED ORAL 2 TIMES DAILY
Qty: 60 TABLET | Refills: 5 | Status: SHIPPED | OUTPATIENT
Start: 2024-09-05

## 2024-09-07 NOTE — CARE COORDINATION
History  Chief Complaint   Patient presents with    Leg Swelling     Pt having b/l leg swelling x 1 week      Patient presents to the emergency department accompanied by her daughter for evaluation of lower extremity swelling that has progressed over the past week or so.  Patient reports some chronic shortness of breath and cough, but states that these have not worsened significantly.  Denies any chest pain.  Was having some burning type pain in her right leg when she tries to walk.  The pain is not constant.  Denies prior history of blood clots.  Does have a history of atrial fibrillation.  Was on Xarelto in the past, currently just on aspirin.  She does have a Watchman device in place.  Denies any fevers.  No lightheadedness or dizziness.  Has been compliant with her medications.  Does still take Lasix 40 mg twice daily.  No other complaints, modifying factors, or associated symptoms.        Prior to Admission Medications   Prescriptions Last Dose Informant Patient Reported? Taking?   allopurinol (ZYLOPRIM) 100 mg tablet   Yes No   Sig: TAKE TWO TABLETS BY MOUTH DAILY   amLODIPine (NORVASC) 5 mg tablet   Yes No   Sig: Take 5 mg by mouth daily   atenolol (TENORMIN) 25 mg tablet   Yes No   Sig: Take 25 mg by mouth daily    benazepril (LOTENSIN) 10 mg tablet   Yes No   Sig: TAKE ONE TABLET BY MOUTH DAILY.   brinzolamide (Azopt) 1 % ophthalmic suspension   Yes No   fluticasone (FLONASE) 50 mcg/act nasal spray   Yes No   Si spray into each nostril daily    furosemide (LASIX) 40 mg tablet   Yes No   Sig: Take 40 mg by mouth 2 (two) times a day    meclizine (ANTIVERT) 25 mg tablet   Yes No   Sig: Take by mouth   pravastatin (PRAVACHOL) 40 mg tablet   Yes No   Sig: TAKE ONE TABLET BY MOUTH DAILY.   rivaroxaban (Xarelto) 20 mg tablet   Yes No   Sig: Take 20 mg by mouth daily with breakfast       Facility-Administered Medications: None       Past Medical History:   Diagnosis Date    A-fib (HCC)     Coronary artery  [facility] Care [facility] Follow Up Call    Patient Current Location:  [address] [place]  [address] [facility]    Care Transition Nurse contacted the patient by telephone to follow up after admission on 2023. Verified name and  with patient as identifiers. Patient: Keira Guzman  Patient : 1940   MRN: [id number]    Reason for Admission: S/P Robotic Lap. Partial Colectomy  Discharge Date: 23  RARS: Readmission Risk Score: 11      Needs to be reviewed by the provider   Additional needs identified to be addressed with provider: No  none             Method of communication with provider: none. CTN spoke with patient this afternoon for follow up CTN call. Patient states she is doing a little better today. Patient states bleeding from rectum has stopped, but she is know constipated. Patient states she did have a small BM today, finally, but feels she can go some more. No reports of any fever, chills, nausea, vomiting, chest pain, SOB or cough. Patient with no congestion, pain, difficulty emptying bladder, LE edema, feeling lightheaded, dizziness, and heart palpitations. No new or changed medications, no other issues or concerns at this time. Addressed changes since last contact:  none  Discussed follow-up appointments. If no appointment was previously scheduled, appointment scheduling offered: Yes. Is follow up appointment scheduled within 7 days of discharge? Yes. Follow Up  Future Appointments   Date Time Provider [address]   2023 11:30 AM Maria Luisa Parkinson MD [facility] [place] [facility]   8/15/2023 11:30 AM Michael Adamson MD [facility]   11/3/2023  9:30 AM JOSIANE Hutchinson [facility]     Care Transition Nurse reviewed medical action plan and red flags with patient and discussed any barriers to care and/or understanding of plan of care after discharge.  Discussed appropriate site of care based on symptoms and resources available to patient disease     Dizziness     Edema     GERD (gastroesophageal reflux disease)     High cholesterol     Hypertension        Past Surgical History:   Procedure Laterality Date    CHOLECYSTECTOMY OPEN         Family History   Problem Relation Age of Onset    Hypertension Father      I have reviewed and agree with the history as documented.    E-Cigarette/Vaping    E-Cigarette Use Never User      E-Cigarette/Vaping Substances     Social History     Tobacco Use    Smoking status: Never    Smokeless tobacco: Never   Vaping Use    Vaping status: Never Used   Substance Use Topics    Alcohol use: Not Currently    Drug use: Not Currently       Review of Systems   All other systems reviewed and are negative.      Physical Exam  Physical Exam  Vitals and nursing note reviewed.   Constitutional:       General: She is not in acute distress.     Appearance: She is well-developed.   HENT:      Head: Normocephalic and atraumatic.      Right Ear: External ear normal.      Left Ear: External ear normal.      Nose: Nose normal.      Mouth/Throat:      Mouth: Mucous membranes are moist.   Eyes:      Extraocular Movements: Extraocular movements intact.      Conjunctiva/sclera: Conjunctivae normal.      Pupils: Pupils are equal, round, and reactive to light.   Cardiovascular:      Rate and Rhythm: Normal rate. Rhythm irregular.      Pulses: Normal pulses.      Heart sounds: Murmur heard.      No friction rub. No gallop.   Pulmonary:      Effort: Pulmonary effort is normal. No respiratory distress.      Breath sounds: Normal breath sounds. No wheezing, rhonchi or rales.   Abdominal:      General: Abdomen is flat.      Palpations: Abdomen is soft.      Tenderness: There is no abdominal tenderness. There is no guarding or rebound.   Musculoskeletal:         General: No swelling. Normal range of motion.      Cervical back: Normal range of motion and neck supple.      Right lower leg: Edema present.      Left lower leg: Edema present.       Comments: Mild bilateral nonpitting edema present.   Skin:     General: Skin is warm and dry.      Capillary Refill: Capillary refill takes less than 2 seconds.   Neurological:      General: No focal deficit present.      Mental Status: She is alert and oriented to person, place, and time.   Psychiatric:         Mood and Affect: Mood normal.         Behavior: Behavior normal.         Vital Signs  ED Triage Vitals [09/07/24 1823]   Temperature Pulse Respirations Blood Pressure SpO2   98.5 °F (36.9 °C) 85 18 (!) 173/72 93 %      Temp Source Heart Rate Source Patient Position - Orthostatic VS BP Location FiO2 (%)   Temporal Monitor Sitting Right arm --      Pain Score       --           Vitals:    09/07/24 1900 09/07/24 1915 09/07/24 1920 09/07/24 1940   BP: 133/60 127/61 131/55 150/60   Pulse: 71 67 68 70   Patient Position - Orthostatic VS:             Visual Acuity  Visual Acuity      Flowsheet Row Most Recent Value   L Pupil Size (mm) 3   R Pupil Size (mm) 3            ED Medications  Medications   furosemide (LASIX) injection 40 mg (40 mg Intravenous Given 9/7/24 1921)       Diagnostic Studies  Results Reviewed       Procedure Component Value Units Date/Time    D-Dimer [[id number]]  (Abnormal) Collected: 09/07/24 1826    Lab Status: Final result Specimen: Blood from Arm, Left Updated: 09/07/24 1928     D-Dimer, Quant 2.16 ug/ml FEU     Narrative:      In the evaluation for possible pulmonary embolism, in the appropriate (Well's Score of 4 or less) patient, the age adjusted d-dimer cutoff for this patient can be calculated as:    Age x 0.01 (in ug/mL) for Age-adjusted D-dimer exclusion threshold for a patient over 50 years.    Comprehensive metabolic panel [[id number]]  (Abnormal) Collected: 09/07/24 1826    Lab Status: Final result Specimen: Blood from Arm, Left Updated: 09/07/24 1900     Sodium 139 mmol/L      Potassium 4.4 mmol/L      Chloride 97 mmol/L      CO2 35 mmol/L      ANION GAP 7 mmol/L      BUN 24 mg/dL       Creatinine 1.07 mg/dL      Glucose 113 mg/dL      Calcium 10.5 mg/dL      AST 27 U/L      ALT 15 U/L      Alkaline Phosphatase 41 U/L      Total Protein 7.6 g/dL      Albumin 4.4 g/dL      Total Bilirubin 0.66 mg/dL      eGFR 45 ml/min/1.73sq m     Narrative:      National Kidney Disease Foundation guidelines for Chronic Kidney Disease (CKD):     Stage 1 with normal or high GFR (GFR > 90 mL/min/1.73 square meters)    Stage 2 Mild CKD (GFR = 60-89 mL/min/1.73 square meters)    Stage 3A Moderate CKD (GFR = 45-59 mL/min/1.73 square meters)    Stage 3B Moderate CKD (GFR = 30-44 mL/min/1.73 square meters)    Stage 4 Severe CKD (GFR = 15-29 mL/min/1.73 square meters)    Stage 5 End Stage CKD (GFR <15 mL/min/1.73 square meters)  Note: GFR calculation is accurate only with a steady state creatinine    B-Type Natriuretic Peptide(BNP) [[id number]]  (Abnormal) Collected: 09/07/24 1826    Lab Status: Final result Specimen: Blood from Arm, Left Updated: 09/07/24 1900      pg/mL     HS Troponin 0hr (reflex protocol) [[id number]]  (Normal) Collected: 09/07/24 1826    Lab Status: Final result Specimen: Blood from Arm, Left Updated: 09/07/24 1857     hs TnI 0hr 28 ng/L     Smear Review(Phlebs Do Not Order) [[id number]]  (Abnormal) Collected: 09/07/24 1826    Lab Status: Final result Specimen: Blood from Arm, Left Updated: 09/07/24 1854     Platelet Estimate Decreased     Large Platelet Present    CBC and differential [[id number]]  (Abnormal) Collected: 09/07/24 1826    Lab Status: Final result Specimen: Blood from Arm, Left Updated: 09/07/24 1853     WBC 3.76 Thousand/uL      RBC 3.14 Million/uL      Hemoglobin 9.9 g/dL      Hematocrit 31.4 %       fL      MCH 31.5 pg      MCHC 31.5 g/dL      RDW 15.3 %      MPV 11.3 fL      Platelets 83 Thousands/uL      nRBC 0 /100 WBCs      Segmented % 59 %      Immature Grans % 0 %      Lymphocytes % 29 %      Monocytes % 9 %      Eosinophils Relative 2 %      Basophils Relative 1  %      Absolute Neutrophils 2.23 Thousands/µL      Absolute Immature Grans 0.01 Thousand/uL      Absolute Lymphocytes 1.08 Thousands/µL      Absolute Monocytes 0.33 Thousand/µL      Eosinophils Absolute 0.09 Thousand/µL      Basophils Absolute 0.02 Thousands/µL     Narrative:      This is an appended report.  These results have been appended to a previously verified report.    Urine Microscopic [[id number]]  (Normal) Collected: 09/07/24 1826    Lab Status: Final result Specimen: Urine, Clean Catch Updated: 09/07/24 1833     RBC, UA None Seen /hpf      WBC, UA 0-1 /hpf      Epithelial Cells Occasional /hpf      Bacteria, UA None Seen /hpf     UA w Reflex to Microscopic w Reflex to Culture [[id number]]  (Abnormal) Collected: 09/07/24 1826    Lab Status: Final result Specimen: Urine, Clean Catch Updated: 09/07/24 1832     Color, UA Straw     Clarity, UA Clear     Specific Gravity, UA 1.010     pH, UA 7.0     Leukocytes, UA Trace     Nitrite, UA Negative     Protein, UA Negative mg/dl      Glucose, UA Negative mg/dl      Ketones, UA Negative mg/dl      Urobilinogen, UA 0.2 E.U./dl      Bilirubin, UA Negative     Occult Blood, UA Negative                   XR chest 1 view portable   ED Interpretation by Ilya Lopez MD (09/07 1907)   Cardiomegaly, increased central vascular congestion consistent with CHF.                 Procedures  Procedures         ED Course                                 SBIRT 20yo+      Flowsheet Row Most Recent Value   Initial Alcohol Screen: US AUDIT-C     1. How often do you have a drink containing alcohol? 0 Filed at: 09/07/2024 1822   2. How many drinks containing alcohol do you have on a typical day you are drinking?  0 Filed at: 09/07/2024 1822   3b. FEMALE Any Age, or MALE 65+: How often do you have 4 or more drinks on one occassion? 0 Filed at: 09/07/2024 1822   Audit-C Score 0 Filed at: 09/07/2024 1822   KIRILL: How many times in the past year have you...    Used an illegal drug or  used a prescription medication for non-medical reasons? Never Filed at: 09/07/2024 1822                      Medical Decision Making  Patient was seen and evaluated.  Presentation concerning for acute exacerbation of congestive heart failure.  Lasix given empirically while workup was initiated.  Chest x-ray shows central vascular congestion.  Oxygenating at baseline oxygen requirement of 2 L/min.  BNP found to be elevated in the 300s.  Bilateral lower extremity edema noted.  D-dimer elevated, venous duplex not available at this time for testing.  Chronic anemia noted as well as thrombocytopenia, unclear etiology.  Given age, chest x-ray and laboratory abnormalities, clinical assessment, hospitalist was consulted for admission.  Case discussed with Dr. Velazquez, who accepted the patient to the hospitalist service for observation.    Amount and/or Complexity of Data Reviewed  Labs: ordered.  Radiology: independent interpretation performed.  ECG/medicine tests: ordered and independent interpretation performed.     Details: EKG by my interpretation demonstrates atrial fibrillation at a ventricular rate of 71 bpm.  Right axis deviation, normal intervals.  No acute ST elevations or T wave inversions.  Inferior T wave abnormality present.  Compared with prior EKG from June 4, 2024, PVCs are no longer present.  T wave inversions appear unchanged from prior.    Risk  Prescription drug management.  Decision regarding hospitalization.                 Disposition  Final diagnoses:   Acute on chronic congestive heart failure, unspecified heart failure type (HCC)     Time reflects when diagnosis was documented in both MDM as applicable and the Disposition within this note       Time User Action Codes Description Comment    9/7/2024  8:29 PM Ilya Lopez Add [I50.9] Acute on chronic congestive heart failure, unspecified heart failure type (HCC)           ED Disposition       ED Disposition   Admit    Condition   Stable     Date/Time   Sat Sep 7, 2024 2030    Comment   Case was discussed with Dr. Velazquez and the patient's admission status was agreed to be Admission Status: observation status to the service of Dr. Velazquez.               Follow-up Information    None         Patient's Medications   Discharge Prescriptions    No medications on file       No discharge procedures on file.    PDMP Review       None            ED Provider  Electronically Signed by             Ilya Lopez MD  09/07/24 2033

## 2024-09-17 ENCOUNTER — TELEPHONE (OUTPATIENT)
Dept: FAMILY MEDICINE CLINIC | Age: 84
End: 2024-09-17

## 2024-10-02 ENCOUNTER — OFFICE VISIT (OUTPATIENT)
Dept: FAMILY MEDICINE CLINIC | Age: 84
End: 2024-10-02

## 2024-10-02 VITALS
HEART RATE: 89 BPM | WEIGHT: 159.8 LBS | OXYGEN SATURATION: 97 % | DIASTOLIC BLOOD PRESSURE: 70 MMHG | SYSTOLIC BLOOD PRESSURE: 114 MMHG | BODY MASS INDEX: 29.4 KG/M2 | TEMPERATURE: 97.3 F | HEIGHT: 62 IN

## 2024-10-02 DIAGNOSIS — R60.9 EDEMA, UNSPECIFIED TYPE: Primary | ICD-10-CM

## 2024-10-02 DIAGNOSIS — R60.0 LOCALIZED EDEMA: ICD-10-CM

## 2024-10-02 RX ORDER — METRONIDAZOLE 7.5 MG/G
GEL TOPICAL 2 TIMES DAILY
COMMUNITY
Start: 2024-09-05

## 2024-10-02 ASSESSMENT — ENCOUNTER SYMPTOMS
CONSTIPATION: 0
WHEEZING: 0
COUGH: 0
SHORTNESS OF BREATH: 0
DIARRHEA: 0

## 2024-10-02 NOTE — PROGRESS NOTES
Tami North is a 84 y.o. year old female here for:    Chief Complaint:    Chief Complaint   Patient presents with    Leg Swelling     X 2 wks        Subjective:         HPI:     Patient experiencing bilateral lower extremity swelling, worse on the left for the past 2 weeks.  She does report some mild pain around the level of the ankle on the left leg.  She reports that the swelling resolves overnight and typically gets worse throughout the day.  No significant pain with ambulation.  She states that this has been a chronic issue for her but it is recently worsened.  No other acute concerns.    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    Wears dentures     Wears glasses      Social History     Tobacco Use    Smoking status: Never    Smokeless tobacco: Never   Vaping Use    Vaping status: Never Used   Substance Use Topics    Alcohol use: No    Drug use: No     Family History   Problem Relation Age of Onset    Heart Disease Mother     Heart Attack Mother     Heart Disease Father     Heart Attack Father     Heart Disease Brother     Cancer Brother 55        colon    Cancer Other 40        son: colon    Heart Attack Other 62        4 total last age 75     Past Surgical History:   Procedure Laterality Date    BLEPHAROPLASTY Bilateral 1/11; 3/12    [facility]    CHOLECYSTECTOMY, LAPAROSCOPIC  01/07/2016    Dr Looney: [facility]    COLONOSCOPY

## 2024-10-31 ENCOUNTER — TELEPHONE (OUTPATIENT)
Dept: FAMILY MEDICINE CLINIC | Age: 84
End: 2024-10-31

## 2024-10-31 NOTE — TELEPHONE ENCOUNTER
----- Message from Berny HOBBS sent at 10/31/2024 12:24 PM EDT -----  Regarding: ECC Appointment Request  ECC Appointment Request    Patient needs appointment for ECC Appointment Type: Existing Condition Follow Up.    Patient Requested Dates(s): as soon as possible  Patient Requested Time: early afternoon  Provider Name: Hugo Howard MD    Reason for Appointment Request: Other - wants to reschedule to a sooner appointment  --------------------------------------------------------------------------------------------------------------------------    Relationship to Patient: Self     Call Back Information: OK to leave message on voicemail  Preferred Call Back Number: Phone [phone]    The patient is requesting to be called if there is a sooner available or cancellation because she wants to reschedule her appointment on 11/12 to a sooner one.

## 2024-10-31 NOTE — TELEPHONE ENCOUNTER
Called patient about this encounter  and left message for her to call back     Please call and advise if earlier appointment can be made for her

## 2024-11-08 ENCOUNTER — OFFICE VISIT (OUTPATIENT)
Dept: FAMILY MEDICINE CLINIC | Age: 84
End: 2024-11-08

## 2024-11-08 VITALS
SYSTOLIC BLOOD PRESSURE: 148 MMHG | HEART RATE: 67 BPM | BODY MASS INDEX: 29.63 KG/M2 | OXYGEN SATURATION: 98 % | WEIGHT: 162 LBS | DIASTOLIC BLOOD PRESSURE: 80 MMHG

## 2024-11-08 DIAGNOSIS — I10 ESSENTIAL HYPERTENSION, BENIGN: ICD-10-CM

## 2024-11-08 DIAGNOSIS — I82.532 CHRONIC DEEP VEIN THROMBOSIS (DVT) OF POPLITEAL VEIN OF LEFT LOWER EXTREMITY (HCC): ICD-10-CM

## 2024-11-08 DIAGNOSIS — R60.9 EDEMA, UNSPECIFIED TYPE: Primary | ICD-10-CM

## 2024-11-08 ASSESSMENT — ENCOUNTER SYMPTOMS
SHORTNESS OF BREATH: 0
GASTROINTESTINAL NEGATIVE: 1
RESPIRATORY NEGATIVE: 1

## 2024-11-08 NOTE — PROGRESS NOTES
Normocephalic and atraumatic.      Left Ear: Tympanic membrane, ear canal and external ear normal.   Eyes:      Conjunctiva/sclera: Conjunctivae normal.   Neck:      Thyroid: No thyromegaly.      Vascular: No carotid bruit.   Cardiovascular:      Rate and Rhythm: Normal rate and regular rhythm.   Pulmonary:      Effort: Pulmonary effort is normal.      Breath sounds: Normal breath sounds. No wheezing or rales.   Musculoskeletal:      Cervical back: Neck supple.   Lymphadenopathy:      Cervical: No cervical adenopathy.   Skin:     General: Skin is warm and dry.   Neurological:      Mental Status: She is alert and oriented to person, place, and time.   Psychiatric:         Mood and Affect: Mood normal.         Behavior: Behavior normal.         Thought Content: Thought content normal.         Judgment: Judgment normal.                  An electronic signature was used to authenticate this note.    --Hugo Howard MD

## 2024-11-18 ENCOUNTER — TELEPHONE (OUTPATIENT)
Dept: FAMILY MEDICINE CLINIC | Age: 84
End: 2024-11-18

## 2024-11-18 NOTE — TELEPHONE ENCOUNTER
----- Message from Blue D sent at 11/18/2024 11:53 AM EST -----  Regarding: ECC Message to Provider  ECC Message to Provider    Relationship to Patient: Self     Additional Information - Ms. North can't get a soon appointment for Echo Testing - TTE at the [facility] (on the [address]). The next availability would be February 21, 2025. Does Dr. Cagle have another way to have her scheduled sooner?  --------------------------------------------------------------------------------------------------------------------------    Call Back Information: OK to leave message on voicemail  Preferred Call Back Number: Phone  -  [phone]

## 2024-11-18 NOTE — TELEPHONE ENCOUNTER
Called pt back and pt stated that she is having a hard time scheduling an echo appt       Pt stated that Dr Cagle is the one who wrote the order    Pt stated that she got the vascular test on her leg last week    Message from pt:    Ms. North can't get a soon appointment for Echo Testing - TTE at the [facility] (on the [address]). The next availability would be February 21, 2025. Does Dr. Cagle have another way to have her scheduled sooner?

## 2024-12-20 DIAGNOSIS — K21.9 GASTROESOPHAGEAL REFLUX DISEASE: ICD-10-CM

## 2024-12-23 NOTE — TELEPHONE ENCOUNTER
Medication:   Requested Prescriptions     Pending Prescriptions Disp Refills    omeprazole (PRILOSEC) 20 MG delayed release capsule [Pharmacy Med Name: omeprazole 20 mg capsule,delayed release] 30 capsule 5     Sig: TAKE ONE Capsule BY MOUTH EVERY MORNING        Last Filled:  06/21/2024    Patient Phone Number: [phone] (home)     Last appt: 11/8/2024   Next appt: 12/26/2024    Last OARRS:        No data to display

## 2024-12-26 ENCOUNTER — OFFICE VISIT (OUTPATIENT)
Dept: FAMILY MEDICINE CLINIC | Age: 84
End: 2024-12-26

## 2024-12-26 VITALS
BODY MASS INDEX: 27.8 KG/M2 | OXYGEN SATURATION: 97 % | WEIGHT: 152 LBS | SYSTOLIC BLOOD PRESSURE: 132 MMHG | HEART RATE: 73 BPM | DIASTOLIC BLOOD PRESSURE: 74 MMHG

## 2024-12-26 DIAGNOSIS — I10 ESSENTIAL HYPERTENSION, BENIGN: Primary | ICD-10-CM

## 2024-12-26 DIAGNOSIS — I51.89 DIASTOLIC DYSFUNCTION: ICD-10-CM

## 2024-12-26 DIAGNOSIS — R60.0 PEDAL EDEMA: ICD-10-CM

## 2024-12-26 NOTE — PROGRESS NOTES
Adult)   Pulse 73   Wt 68.9 kg (152 lb)   LMP 08/18/1993 (Exact Date)   SpO2 97%   BMI 27.80 kg/m²    BP: my cuff  120/82                         patient cuff:  the patient did not bring her cuff   Appearance alert and oriented and in no distress.  Skin: warm and dry  Eyes: PERRL  Neck: No JVD, or bruits. No adenopathy or thyromegaly  Lungs: Chest is clear; no wheezes or rales.  Heart: S1 and S2 normal, no murmurs, clicks, gallops or rubs. Regular rate and rhythm.  Ext:: No edema  Lab review: 12/9.     Assessment/Plan:    Tami was seen today for follow-up.    Diagnoses and all orders for this visit:    Essential hypertension, benign  Reasonably well controlled  Continue current treatment  Home BP checks  Return 3 months     Pedal edema  Improved and in fact today had no pitting edema  Diastolic dysfunction  Patient is essentially asymptomatic  Nephrologist would like her to see a cardiologist.  -     Osmel Pop MD, Cardiology, [place]

## 2024-12-30 DIAGNOSIS — L30.8 OTHER SPECIFIED DERMATITIS: ICD-10-CM

## 2024-12-30 NOTE — TELEPHONE ENCOUNTER
Medication:   Requested Prescriptions     Pending Prescriptions Disp Refills    augmented betamethasone dipropionate (DIPROLENE-AF) 0.05 % cream [Pharmacy Med Name: betamethasone, augmented 0.05 % topical cream] 50 g 3     Sig: APPLY TO THE AFFECTED AREA(S) of RASH TWICE DAILY FOR TWO WEEKS AT a time with a one WEEK break AS NEEDED        Last Filled:  none found    Patient Phone Number: [phone] (home)     Last appt: 12/26/2024   Next appt: 2/6/2025    Last OARRS:        No data to display

## 2024-12-31 RX ORDER — BETAMETHASONE DIPROPIONATE 0.5 MG/G
CREAM TOPICAL
Qty: 50 G | Refills: 3 | Status: SHIPPED | OUTPATIENT
Start: 2024-12-31

## 2025-01-03 ENCOUNTER — TELEPHONE (OUTPATIENT)
Dept: FAMILY MEDICINE CLINIC | Age: 85
End: 2025-01-03

## 2025-01-03 DIAGNOSIS — L30.9 DERMATITIS: ICD-10-CM

## 2025-01-03 DIAGNOSIS — L30.8 OTHER SPECIFIED DERMATITIS: ICD-10-CM

## 2025-01-03 NOTE — TELEPHONE ENCOUNTER
Raquel from [facility] called and expressed the prescription is not the correct prescription and would like it removed.     augmented betamethasone dipropionate (DIPROLENE-AF) 0.05 % cream [[id number]]     The correct prescription they need is:    betamethasone dipropionate 0.05 % cream [[id number]]     Last visit was on 12/26/24.    The requested an E script.     [facility] [place], [place] - [facility] [address] - P [phone] - F [phone]

## 2025-01-07 RX ORDER — BETAMETHASONE DIPROPIONATE 0.5 MG/G
CREAM TOPICAL
Qty: 45 G | Refills: 3 | Status: SHIPPED | OUTPATIENT
Start: 2025-01-07

## 2025-01-17 NOTE — PROGRESS NOTES
[facility]  [phone]      Patient: Tami North  YOB: 1940         Chief Complaint   Patient presents with    New Patient     Pt denies cardiac symptoms at this time.          Referring provider: Guilherme Edwards MD    History of Present Illness:   Tami North is a 84 y.o. female presenting as a new patient to me from her pcp.     Today she is here alone. She reports she is here at recommendation of her pcp for lower extremity edema. She Denies chest pain/pressure/squeezing/tightness, dizziness/lightheadedness, shortness of breath/dyspnea on exertion. Reports she has had lower extremity edema for many years but after recent echo she was told she needed to see a cardiologist.     Both parents passed from MI, older brother with CAD first diagnosed in his 40s.     With regard to medication therapy he/she has been compliant with prescribed regimen and has tolerated therapy to date.    Past Medical History:   has a past medical history of Arthritis, Cancer (HCC), CKD (chronic kidney disease), stage III (HCC), Colon polyps, Diverticulosis, Diverticulosis of sigmoid, Elevated cholesterol with high triglycerides, Essential hypertension, benign, Family history of colon cancer, Fibrocystic disease of breast, Hx of blood clots, Hyperlipidemia, Prolonged emergence from general anesthesia, Rosacea, Spinal stenosis lumbar region, Tubulovillous adenoma polyp of colon, Wears dentures, and Wears glasses.    Surgical History:   has a past surgical history that includes Colonoscopy (4/09,6/2011; 6/18/14); blepharoplasty (Bilateral, 1/11; 3/12); Cholecystectomy, laparoscopic (01/07/2016); Mohs surgery (Left, 09/2017); Colonoscopy (09/27/2021); joint replacement (Bilateral); Colonoscopy (N/A, 3/6/2023); Colonoscopy (N/A, 7/5/2023); Colonoscopy (7/5/2023); Colonoscopy (7/5/2023); and hemicolectomy (N/A, 7/28/2023).     Current Outpatient Medications   Medication Sig Dispense Refill

## 2025-01-22 NOTE — PATIENT INSTRUCTIONS
Take your lasix daily and wearing compression stockings when up and about during the day. When sitting in a chair resting try to elevate your legs

## 2025-01-23 ENCOUNTER — OFFICE VISIT (OUTPATIENT)
Age: 85
End: 2025-01-23
Payer: MEDICARE

## 2025-01-23 VITALS
HEIGHT: 62 IN | BODY MASS INDEX: 28.16 KG/M2 | OXYGEN SATURATION: 98 % | DIASTOLIC BLOOD PRESSURE: 64 MMHG | WEIGHT: 153 LBS | SYSTOLIC BLOOD PRESSURE: 136 MMHG | HEART RATE: 81 BPM

## 2025-01-23 DIAGNOSIS — N18.32 HYPERTENSIVE KIDNEY DISEASE WITH STAGE 3B CHRONIC KIDNEY DISEASE (HCC): ICD-10-CM

## 2025-01-23 DIAGNOSIS — I12.9 HYPERTENSIVE KIDNEY DISEASE WITH STAGE 3B CHRONIC KIDNEY DISEASE (HCC): ICD-10-CM

## 2025-01-23 DIAGNOSIS — E78.2 MIXED HYPERLIPIDEMIA: ICD-10-CM

## 2025-01-23 DIAGNOSIS — N18.31 STAGE 3A CHRONIC KIDNEY DISEASE (HCC): ICD-10-CM

## 2025-01-23 DIAGNOSIS — I51.7 MILD CONCENTRIC LEFT VENTRICULAR HYPERTROPHY (LVH): Primary | ICD-10-CM

## 2025-01-23 DIAGNOSIS — I82.532 CHRONIC DEEP VEIN THROMBOSIS (DVT) OF POPLITEAL VEIN OF LEFT LOWER EXTREMITY (HCC): ICD-10-CM

## 2025-01-23 PROCEDURE — 1124F ACP DISCUSS-NO DSCNMKR DOCD: CPT | Performed by: INTERNAL MEDICINE

## 2025-01-23 PROCEDURE — 1159F MED LIST DOCD IN RCRD: CPT | Performed by: INTERNAL MEDICINE

## 2025-01-23 PROCEDURE — 3075F SYST BP GE 130 - 139MM HG: CPT | Performed by: INTERNAL MEDICINE

## 2025-01-23 PROCEDURE — 93000 ELECTROCARDIOGRAM COMPLETE: CPT | Performed by: INTERNAL MEDICINE

## 2025-01-23 PROCEDURE — 3078F DIAST BP <80 MM HG: CPT | Performed by: INTERNAL MEDICINE

## 2025-01-23 PROCEDURE — 99204 OFFICE O/P NEW MOD 45 MIN: CPT | Performed by: INTERNAL MEDICINE

## 2025-01-27 PROBLEM — I50.32 CHRONIC DIASTOLIC CONGESTIVE HEART FAILURE (HCC): Status: ACTIVE | Noted: 2025-01-27

## 2025-01-27 PROBLEM — N18.31 CKD STAGE 3A, GFR 45-59 ML/MIN (HCC): Status: ACTIVE | Noted: 2025-01-27

## 2025-02-06 ENCOUNTER — OFFICE VISIT (OUTPATIENT)
Dept: FAMILY MEDICINE CLINIC | Age: 85
End: 2025-02-06
Payer: MEDICARE

## 2025-02-06 VITALS
OXYGEN SATURATION: 95 % | SYSTOLIC BLOOD PRESSURE: 134 MMHG | WEIGHT: 155.4 LBS | HEIGHT: 62 IN | TEMPERATURE: 97.8 F | DIASTOLIC BLOOD PRESSURE: 71 MMHG | BODY MASS INDEX: 28.6 KG/M2 | HEART RATE: 76 BPM

## 2025-02-06 DIAGNOSIS — E78.00 PURE HYPERCHOLESTEROLEMIA: ICD-10-CM

## 2025-02-06 DIAGNOSIS — K21.9 GASTROESOPHAGEAL REFLUX DISEASE, UNSPECIFIED WHETHER ESOPHAGITIS PRESENT: ICD-10-CM

## 2025-02-06 DIAGNOSIS — I82.452 DEEP VENOUS THROMBOSIS (DVT) OF LEFT PERONEAL VEIN, UNSPECIFIED CHRONICITY (HCC): ICD-10-CM

## 2025-02-06 DIAGNOSIS — K51.40 PSEUDOPOLYPOSIS OF COLON WITHOUT COMPLICATION, UNSPECIFIED PART OF COLON (HCC): ICD-10-CM

## 2025-02-06 DIAGNOSIS — I10 ESSENTIAL HYPERTENSION, BENIGN: Primary | ICD-10-CM

## 2025-02-06 PROCEDURE — 1124F ACP DISCUSS-NO DSCNMKR DOCD: CPT | Performed by: STUDENT IN AN ORGANIZED HEALTH CARE EDUCATION/TRAINING PROGRAM

## 2025-02-06 PROCEDURE — 1159F MED LIST DOCD IN RCRD: CPT | Performed by: STUDENT IN AN ORGANIZED HEALTH CARE EDUCATION/TRAINING PROGRAM

## 2025-02-06 PROCEDURE — 99214 OFFICE O/P EST MOD 30 MIN: CPT | Performed by: STUDENT IN AN ORGANIZED HEALTH CARE EDUCATION/TRAINING PROGRAM

## 2025-02-06 PROCEDURE — 3075F SYST BP GE 130 - 139MM HG: CPT | Performed by: STUDENT IN AN ORGANIZED HEALTH CARE EDUCATION/TRAINING PROGRAM

## 2025-02-06 PROCEDURE — 3078F DIAST BP <80 MM HG: CPT | Performed by: STUDENT IN AN ORGANIZED HEALTH CARE EDUCATION/TRAINING PROGRAM

## 2025-02-06 PROCEDURE — 1160F RVW MEDS BY RX/DR IN RCRD: CPT | Performed by: STUDENT IN AN ORGANIZED HEALTH CARE EDUCATION/TRAINING PROGRAM

## 2025-02-06 PROCEDURE — G2211 COMPLEX E/M VISIT ADD ON: HCPCS | Performed by: STUDENT IN AN ORGANIZED HEALTH CARE EDUCATION/TRAINING PROGRAM

## 2025-02-06 SDOH — ECONOMIC STABILITY: FOOD INSECURITY: WITHIN THE PAST 12 MONTHS, YOU WORRIED THAT YOUR FOOD WOULD RUN OUT BEFORE YOU GOT MONEY TO BUY MORE.: NEVER TRUE

## 2025-02-06 SDOH — ECONOMIC STABILITY: FOOD INSECURITY: WITHIN THE PAST 12 MONTHS, THE FOOD YOU BOUGHT JUST DIDN'T LAST AND YOU DIDN'T HAVE MONEY TO GET MORE.: NEVER TRUE

## 2025-02-06 ASSESSMENT — PATIENT HEALTH QUESTIONNAIRE - PHQ9
SUM OF ALL RESPONSES TO PHQ QUESTIONS 1-9: 0
SUM OF ALL RESPONSES TO PHQ9 QUESTIONS 1 & 2: 0
2. FEELING DOWN, DEPRESSED OR HOPELESS: NOT AT ALL
SUM OF ALL RESPONSES TO PHQ QUESTIONS 1-9: 0
SUM OF ALL RESPONSES TO PHQ QUESTIONS 1-9: 0
1. LITTLE INTEREST OR PLEASURE IN DOING THINGS: NOT AT ALL
SUM OF ALL RESPONSES TO PHQ QUESTIONS 1-9: 0

## 2025-02-11 NOTE — PROGRESS NOTES
mL/min/1.73 m2       Imaging:  No orders to display         ASSESSMENT & PLAN:    Tami North is a 85 y.o. year old female here for New Patient  .The following is a summary of the plan made at this visit:    1. Essential hypertension, benign  2. Deep venous thrombosis (DVT) of left peroneal vein, unspecified chronicity (HCC)  3. Pseudopolyposis of colon without complication, unspecified part of colon (HCC)  4. Gastroesophageal reflux disease, unspecified whether esophagitis present  5. Pure hypercholesterolemia      Reviewed all pertinent previous records, including lab work and imaging.    Patient appears to be stable at this time.  Will discontinue her anticoagulation today as there is not appear to be any need for chronic anticoagulation.  It appears, per their latest note, the cardiology agrees with this.  Otherwise, will maintain medication regimen as it currently stands.  Provided patient with education regarding eustachian tube dysfunction and how she can increase her iron to the diet.  Recommended that she maintain follow-up with her specialty providers and we will plan to follow-up in 6 months for an annual Medicare wellness visit.    Encouraged patient to call back with any question/concerns.  Return/ED precautions discussed, all questions/concerns answered and patient verbalized understanding/approval of treatment plan.   Berny Cagle, DO    This note was generated completely or in part utilizing Dragon dictation speech recognition software.  Occasionally, words are mistranscribed and despite editing, the text may contain inaccuracies due to incorrect word recognition.  If further clarification is needed please contact the office at [phone].

## 2025-03-06 ENCOUNTER — OFFICE VISIT (OUTPATIENT)
Dept: FAMILY MEDICINE CLINIC | Age: 85
End: 2025-03-06

## 2025-03-06 VITALS
SYSTOLIC BLOOD PRESSURE: 146 MMHG | OXYGEN SATURATION: 99 % | HEART RATE: 72 BPM | DIASTOLIC BLOOD PRESSURE: 56 MMHG | WEIGHT: 155 LBS | BODY MASS INDEX: 28.35 KG/M2

## 2025-03-06 DIAGNOSIS — C44.519 BASAL CELL CARCINOMA (BCC) OF SKIN OF OTHER PART OF TORSO: ICD-10-CM

## 2025-03-06 DIAGNOSIS — E78.2 MIXED HYPERLIPIDEMIA: ICD-10-CM

## 2025-03-06 DIAGNOSIS — I10 ESSENTIAL HYPERTENSION, BENIGN: Primary | ICD-10-CM

## 2025-03-06 PROBLEM — R07.9 CHEST PAIN: Status: RESOLVED | Noted: 2018-11-08 | Resolved: 2025-03-06

## 2025-03-06 PROBLEM — H04.129 DRY EYE SYNDROME: Status: RESOLVED | Noted: 2017-09-05 | Resolved: 2025-03-06

## 2025-03-06 RX ORDER — OLMESARTAN MEDOXOMIL 20 MG/1
20 TABLET ORAL DAILY
Qty: 90 TABLET | Refills: 1 | Status: SHIPPED | OUTPATIENT
Start: 2025-03-06

## 2025-03-06 NOTE — PROGRESS NOTES
Tami North is a 85 y.o. year old female here for:  Chief Complaint: No chief complaint on file.        ASSESSMENT & PLAN:  1. Essential hypertension, benign  Assessment & Plan:   Chronic, at goal (stable), continue current treatment plan. Refill sent for generic Olmesartan  Orders:  -     olmesartan (BENICAR) 20 MG tablet; Take 1 tablet by mouth daily, Disp-90 tablet, R-1Please fill the Generic Olmesartan 20 mg for patientNormal  2. Basal cell carcinoma (BCC) of skin of other part of torso  Comments:  Following with Derm. No change in plan today  3. Mixed hyperlipidemia  Assessment & Plan:   Chronic, at goal (stable), continue current treatment plan      Return in about 5 months (around 8/6/2025) for Routine check - appt already scheduled.     Reviewed all pertinent previous records, including lab work and imaging. Encouraged patient to call back with any question/concerns.  Return/ED precautions discussed, all questions/concerns answered and patient verbalized understanding/approval of treatment plan.   Guilherme Edwards MD    Subjective:  HPI:  Patient is a pleasant 85 y.o. year-old female presenting to [facility] for follow-up chronic conditions.    Eliquis -  was dced by cardiology as patient no longer with concern for clot. Has been OK without Eliquis    Benicar for BP. Tolerates without ADR. Due to insurance, can only have the generic. Is hoping to get a refill sent in to [facility]     BCC on back. Recently biopsied by [facility] and planning to undergo treatment in May.     Compliant with medications for chronic conditions and tolerates without ADR. No recent change reported in terms of medications and/or medical conditions.     No other concerns at this time    Past Medical History:   Diagnosis Date    Arthritis     Cancer (HCC)     MELANOMA FACE    CKD (chronic kidney disease), stage III (HCC) 04/09/2019    Colon polyps 04/2009    Diverticulosis 02/2015    CT finding    Diverticulosis of sigmoid     CT

## 2025-04-16 ENCOUNTER — OFFICE VISIT (OUTPATIENT)
Dept: FAMILY MEDICINE CLINIC | Age: 85
End: 2025-04-16

## 2025-04-16 VITALS
OXYGEN SATURATION: 99 % | HEART RATE: 60 BPM | BODY MASS INDEX: 28.71 KG/M2 | WEIGHT: 156 LBS | TEMPERATURE: 97.9 F | DIASTOLIC BLOOD PRESSURE: 68 MMHG | SYSTOLIC BLOOD PRESSURE: 132 MMHG | HEIGHT: 62 IN

## 2025-04-16 DIAGNOSIS — Z01.818 PRE-OP EXAM: Primary | ICD-10-CM

## 2025-04-16 NOTE — PROGRESS NOTES
[facility] Medicine  Clinic Note    Date: 4/16/2025                                               /Objective:     Chief Complaint   Patient presents with    Pre-op Exam     Surgeon: Dr. Bobby Whitaker  Procedure: Cataract Surgery right eye  Date: 5/1/2025  CEI        HPI  Patient is here for preop exam.  Is having cataract surgery in the right eye on 5/1/2025 with Dr. Sherman RODRIGUEZ.    She can walk a good distance without chest pain or shortness of breath.  Stairs do moderate housework.    Denies any family history of blood clots or reactions to anesthesia. Did have a LE DVT years ago, was treated with Eliquis.          Patient Active Problem List    Diagnosis Date Noted    Adenoma of descending colon 03/06/2023    Chronic renal disease, stage III ([facility]) [352488] 04/22/2022    CKD stage 3a, GFR 45-59 ml/min ([facility]) 01/27/2025    Chronic diastolic congestive heart failure ([facility]) 01/27/2025    Pseudopolyposis of colon without complication, unspecified part of colon ([facility]) 09/03/2024    Hypertensive kidney disease with stage 3b chronic kidney disease ([facility]) 05/03/2024    Adenomatous polyp of transverse colon 07/28/2023    Chronic deep vein thrombosis (DVT) of popliteal vein of left lower extremity 12/15/2020    Age-related nuclear cataract of both eyes 06/07/2019    Conjunctival hemorrhage of right eye 06/07/2019    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

## 2025-05-09 DIAGNOSIS — E78.5 HYPERLIPIDEMIA, UNSPECIFIED HYPERLIPIDEMIA TYPE: ICD-10-CM

## 2025-05-11 NOTE — TELEPHONE ENCOUNTER
Medication:   Requested Prescriptions     Pending Prescriptions Disp Refills    fluvastatin (LESCOL XL) 80 MG extended release tablet [Pharmacy Med Name: fluvastatin ER 80 mg tablet,extended release 24 hr] 90 tablet 3     Sig: TAKE ONE Tablet BY MOUTH DAILY       Last Filled:  05/14/2024    Patient Phone Number: [phone] (home)     Last appt: 4/16/2025   Next appt: 8/7/2025    Last Lipid:   Lab Results   Component Value Date/Time    CHOL 155 08/13/2024 12:07 PM    TRIG 130 08/13/2024 12:07 PM    HDL 60 08/13/2024 12:07 PM

## 2025-05-12 RX ORDER — FLUVASTATIN SODIUM 80 MG/1
80 TABLET, FILM COATED, EXTENDED RELEASE ORAL DAILY
Qty: 90 TABLET | Refills: 3 | Status: SHIPPED | OUTPATIENT
Start: 2025-05-12

## 2025-06-25 DIAGNOSIS — K21.9 GASTROESOPHAGEAL REFLUX DISEASE: ICD-10-CM

## 2025-06-25 RX ORDER — OMEPRAZOLE 20 MG/1
20 CAPSULE, DELAYED RELEASE ORAL EVERY MORNING
Qty: 30 CAPSULE | Refills: 5 | Status: SHIPPED | OUTPATIENT
Start: 2025-06-25

## 2025-06-25 NOTE — TELEPHONE ENCOUNTER
Medication:   Requested Prescriptions     Pending Prescriptions Disp Refills    omeprazole (PRILOSEC) 20 MG delayed release capsule 30 capsule 5     Sig: Take 1 capsule by mouth every morning        Last Filled:  12/23/24 30 caps 5 refills    Patient Phone Number: [phone] (home)     Last appt: 4/16/2025   Next appt: 8/7/2025    Last OARRS:        No data to display

## 2025-08-22 ENCOUNTER — OFFICE VISIT (OUTPATIENT)
Dept: FAMILY MEDICINE CLINIC | Age: 85
End: 2025-08-22

## 2025-08-22 VITALS
OXYGEN SATURATION: 98 % | BODY MASS INDEX: 27.8 KG/M2 | WEIGHT: 152 LBS | DIASTOLIC BLOOD PRESSURE: 70 MMHG | HEART RATE: 75 BPM | SYSTOLIC BLOOD PRESSURE: 118 MMHG

## 2025-08-22 DIAGNOSIS — I10 ESSENTIAL HYPERTENSION, BENIGN: ICD-10-CM

## 2025-08-22 DIAGNOSIS — L30.9 DERMATITIS: ICD-10-CM

## 2025-08-22 DIAGNOSIS — R10.31 RIGHT LOWER QUADRANT ABDOMINAL PAIN: ICD-10-CM

## 2025-08-22 DIAGNOSIS — E78.2 MIXED HYPERLIPIDEMIA: Primary | ICD-10-CM

## 2025-08-22 DIAGNOSIS — K64.4 HEMORRHOIDAL SKIN TAGS: ICD-10-CM

## 2025-08-22 DIAGNOSIS — N18.31 CKD STAGE 3A, GFR 45-59 ML/MIN (HCC): ICD-10-CM

## 2025-08-22 PROBLEM — R94.31 NONSPECIFIC ABNORMAL ELECTROCARDIOGRAM (ECG) (EKG): Status: RESOLVED | Noted: 2018-11-08 | Resolved: 2025-08-22

## 2025-08-22 RX ORDER — HYDROCORTISONE 25 MG/G
CREAM TOPICAL
Qty: 28 G | Refills: 5 | Status: SHIPPED | OUTPATIENT
Start: 2025-08-22

## 2025-08-22 RX ORDER — BETAMETHASONE DIPROPIONATE 0.5 MG/G
CREAM TOPICAL
Qty: 45 G | Refills: 3 | Status: SHIPPED | OUTPATIENT
Start: 2025-08-22

## 2025-08-25 ENCOUNTER — HOSPITAL ENCOUNTER (OUTPATIENT)
Age: 85
Discharge: HOME | End: 2025-08-25
Attending: STUDENT IN AN ORGANIZED HEALTH CARE EDUCATION/TRAINING PROGRAM
Payer: MEDICARE

## 2025-08-25 DIAGNOSIS — R10.31 RIGHT LOWER QUADRANT ABDOMINAL PAIN: ICD-10-CM

## 2025-08-25 PROCEDURE — 76705 ECHO EXAM OF ABDOMEN: CPT

## 2025-09-02 DIAGNOSIS — I10 ESSENTIAL HYPERTENSION, BENIGN: ICD-10-CM

## 2025-09-04 RX ORDER — OLMESARTAN MEDOXOMIL 20 MG/1
20 TABLET ORAL DAILY
Qty: 90 TABLET | Refills: 1 | Status: SHIPPED | OUTPATIENT
Start: 2025-09-04

## (undated) DEVICE — NEEDLE HYPO 22GA L1 1/2IN PIVOTING SHLD FOR LUERLOCK SYR

## (undated) DEVICE — BINDER ABD HK  LOOP CLOSURE UNIV 30-45 IN 9 IN WHT PROCARE

## (undated) DEVICE — STERILE PVP: Brand: MEDLINE INDUSTRIES, INC.

## (undated) DEVICE — CANNULA SEAL

## (undated) DEVICE — VALVE SUCTION AIR H2O SET ORCA POD + DISP

## (undated) DEVICE — TOWEL,STOP FLAG GOLD N-W: Brand: MEDLINE

## (undated) DEVICE — SOLUTION ANTIFOG VIS SYS CLEARIFY LAPSCP

## (undated) DEVICE — TROCAR: Brand: KII FIOS FIRST ENTRY

## (undated) DEVICE — BLADE ES ELASTOMERIC COAT INSUL DURABLE BEND UPTO 90DEG

## (undated) DEVICE — ELECTRODE PT RET AD L9FT HI MOIST COND ADH HYDRGEL CORDED

## (undated) DEVICE — LIQUIBAND RAPID ADHESIVE 36/CS 0.8ML: Brand: MEDLINE

## (undated) DEVICE — SPONGE,LAP,18"X18",DLX,XR,ST,5/PK,40/PK: Brand: MEDLINE

## (undated) DEVICE — SYSTEM SMK EVAC LAP TBNG FILTER HSNG BENT STYL PNK SEE CLR

## (undated) DEVICE — PREMIUM WET SKIN PREP TRAY: Brand: MEDLINE INDUSTRIES, INC.

## (undated) DEVICE — NEEDLE 25GAX5.0MM INJ CARR LOCKE

## (undated) DEVICE — ROBOTIC: Brand: MEDLINE INDUSTRIES, INC.

## (undated) DEVICE — AIR/WATER CLEANING ADAPTER FOR OLYMPUS® GI ENDOSCOPE: Brand: BULLDOG®

## (undated) DEVICE — 3M™ IOBAN™ 2 ANTIMICROBIAL INCISE DRAPE 6648EZ: Brand: IOBAN™ 2

## (undated) DEVICE — GLOVE SURG SZ 7 CRM LTX FREE POLYISOPRENE POLYMER BEAD ANTI

## (undated) DEVICE — 40583 XL ADVANCED TRENDELENBURG POSITIONING KIT: Brand: 40583 XL ADVANCED TRENDELENBURG POSITIONING KIT

## (undated) DEVICE — Device: Brand: SPOT EX ENDOSCOPIC TATTOO

## (undated) DEVICE — LAPAROSCOPIC ACCESS SYSTEM: Brand: ALEXIS LAPAROSCOPIC SYSTEM WITH KII FIOS FIRST ENTRY

## (undated) DEVICE — 1LYRTR 16FR10ML100%SIL UMS SNP: Brand: MEDLINE INDUSTRIES, INC.

## (undated) DEVICE — TIP-UP FENESTRATED GRASPER: Brand: ENDOWRIST

## (undated) DEVICE — SOLUTION IV IRRIG WATER 500ML POUR BRL ST 2F7113

## (undated) DEVICE — SEALER/DIVIDER LAP SHFT L44CM JAW APER 11.4MM 315DEG ROT

## (undated) DEVICE — SNARE ENDOSCP POLYP MED STD AD 2.4X27X240 CM 2.8 MM OVL SENS

## (undated) DEVICE — APPLIER CLIP BRAIDED 360 CATHETER RESOLUTION ULTRA

## (undated) DEVICE — STAPLER 60: Brand: SUREFORM

## (undated) DEVICE — APPLICATOR MEDICATED 26 CC SOLUTION HI LT ORNG CHLORAPREP

## (undated) DEVICE — SUTURE MCRYL + SZ 4-0 L27IN ABSRB UD L19MM PS-2 3/8 CIR MCP426H

## (undated) DEVICE — SUTURE VCRL + SZ 3-0 L18IN ABSRB UD SH 1/2 CIR TAPERCUT NDL VCP864D

## (undated) DEVICE — NEEDLE,22GX1.5",REG,BEVEL: Brand: MEDLINE

## (undated) DEVICE — BLADELESS OBTURATOR: Brand: WECK VISTA

## (undated) DEVICE — SCISSORS SURG DIA8MM MPLR CRV ENDOWRIST

## (undated) DEVICE — SNARE ENDOSCP POLYP MED 2.4 MM 240 CM 27 MM STIFF CAPTIVATOR

## (undated) DEVICE — BOWL MED L 32OZ PLAS W/ MOLD GRAD EZ OPN PEEL PCH

## (undated) DEVICE — LAPAROSCOPIC SCISSORS: Brand: EPIX LAPAROSCOPIC SCISSORS

## (undated) DEVICE — SUTURE VCRL + SZ 0 L27IN ABSRB WHT CT-2 1/2 CIR TAPERPOINT VCP270H

## (undated) DEVICE — SOLUTION INJ LR VISIV 1000ML BG

## (undated) DEVICE — ELEVIEW SUBMUCOSAL INJECTABLE COMPOSITION 10ML

## (undated) DEVICE — LARGE NEEDLE DRIVER: Brand: ENDOWRIST

## (undated) DEVICE — PUMP SUC IRR TBNG L10FT W/ HNDPC ASSEMB STRYKEFLOW 2

## (undated) DEVICE — ENDOSCOPIC KIT 6X3/16 FT COLON W/ 1.1 OZ 2 GWN W/O BRSH

## (undated) DEVICE — GOWN,SIRUS,POLYRNF,BRTHSLV,XL,30/CS: Brand: MEDLINE

## (undated) DEVICE — SYRINGE MED 10ML LUERLOCK TIP W/O SFTY DISP

## (undated) DEVICE — TRAP SPEC RETRV CLR PLAS POLYP IN LN SUCT QUIK CTCH

## (undated) DEVICE — GENERAL LAPAROSCOPIC: Brand: MEDLINE INDUSTRIES, INC.

## (undated) DEVICE — CLIP: Brand: RESOLUTION 360™ ULTRA CLIP

## (undated) DEVICE — PACK LAP IV REINF TBL CVR ADH UTIL UNDERBUTTOCK DRP W CUF

## (undated) DEVICE — VESSEL SEALER EXTEND: Brand: ENDOWRIST

## (undated) DEVICE — TOTAL TRAY, 16FR 10ML SIL FOLEY, URN: Brand: MEDLINE

## (undated) DEVICE — NEEDLE INJ ARTC 2.5MMX230CM

## (undated) DEVICE — FENESTRATED BIPOLAR FORCEPS: Brand: ENDOWRIST

## (undated) DEVICE — SUTURE MCRYL SZ 4-0 L27IN ABSRB UD L19MM PS-2 1/2 CIR PRIM Y426H

## (undated) DEVICE — BW-412T DISP COMBO CLEANING BRUSH: Brand: SINGLE USE COMBINATION CLEANING BRUSH

## (undated) DEVICE — SYRINGE CATH TIP 50ML

## (undated) DEVICE — Device: Brand: DISPOSABLE ELECTROSURGICAL SNARE

## (undated) DEVICE — FORCEPS BX L240CM WRK CHN 2.8MM STD CAP W/ NDL MIC MESH